# Patient Record
Sex: MALE | Race: WHITE | NOT HISPANIC OR LATINO | Employment: OTHER | URBAN - METROPOLITAN AREA
[De-identification: names, ages, dates, MRNs, and addresses within clinical notes are randomized per-mention and may not be internally consistent; named-entity substitution may affect disease eponyms.]

---

## 2020-02-26 ENCOUNTER — OFFICE VISIT - HEALTHEAST (OUTPATIENT)
Dept: INTERNAL MEDICINE | Facility: CLINIC | Age: 70
End: 2020-02-26

## 2020-02-26 ENCOUNTER — COMMUNICATION - HEALTHEAST (OUTPATIENT)
Dept: TELEHEALTH | Facility: CLINIC | Age: 70
End: 2020-02-26

## 2020-02-26 DIAGNOSIS — R97.20 ELEVATED PSA: ICD-10-CM

## 2020-02-26 DIAGNOSIS — R09.81 NASAL CONGESTION: ICD-10-CM

## 2020-02-26 DIAGNOSIS — R05.3 CHRONIC COUGH: ICD-10-CM

## 2020-02-26 ASSESSMENT — MIFFLIN-ST. JEOR: SCORE: 1911.12

## 2020-08-10 ENCOUNTER — COMMUNICATION - HEALTHEAST (OUTPATIENT)
Dept: SCHEDULING | Facility: CLINIC | Age: 70
End: 2020-08-10

## 2020-08-10 ENCOUNTER — AMBULATORY - HEALTHEAST (OUTPATIENT)
Dept: INTERNAL MEDICINE | Facility: CLINIC | Age: 70
End: 2020-08-10

## 2020-08-10 DIAGNOSIS — U07.1 COVID-19: ICD-10-CM

## 2020-08-12 ENCOUNTER — AMBULATORY - HEALTHEAST (OUTPATIENT)
Dept: FAMILY MEDICINE | Facility: CLINIC | Age: 70
End: 2020-08-12

## 2020-08-12 DIAGNOSIS — U07.1 COVID-19: ICD-10-CM

## 2020-08-14 ENCOUNTER — COMMUNICATION - HEALTHEAST (OUTPATIENT)
Dept: SCHEDULING | Facility: CLINIC | Age: 70
End: 2020-08-14

## 2020-08-14 ENCOUNTER — COMMUNICATION - HEALTHEAST (OUTPATIENT)
Dept: INTERNAL MEDICINE | Facility: CLINIC | Age: 70
End: 2020-08-14

## 2020-09-10 ENCOUNTER — OFFICE VISIT - HEALTHEAST (OUTPATIENT)
Dept: INTERNAL MEDICINE | Facility: CLINIC | Age: 70
End: 2020-09-10

## 2020-09-10 DIAGNOSIS — R97.20 ELEVATED PSA: ICD-10-CM

## 2020-09-10 DIAGNOSIS — Z13.6 ENCOUNTER FOR SCREENING FOR CARDIOVASCULAR DISORDERS: ICD-10-CM

## 2020-09-10 DIAGNOSIS — Z00.00 ENCOUNTER FOR MEDICARE ANNUAL WELLNESS EXAM: ICD-10-CM

## 2020-09-10 DIAGNOSIS — Z12.11 SCREEN FOR COLON CANCER: ICD-10-CM

## 2020-09-10 DIAGNOSIS — M54.41 CHRONIC RIGHT-SIDED LOW BACK PAIN WITH RIGHT-SIDED SCIATICA: ICD-10-CM

## 2020-09-10 DIAGNOSIS — G89.29 CHRONIC RIGHT-SIDED LOW BACK PAIN WITH RIGHT-SIDED SCIATICA: ICD-10-CM

## 2020-09-10 DIAGNOSIS — Z79.899 HIGH RISK MEDICATION USE: ICD-10-CM

## 2020-09-10 LAB
ALBUMIN SERPL-MCNC: 4 G/DL (ref 3.5–5)
ALBUMIN UR-MCNC: NEGATIVE MG/DL
ALP SERPL-CCNC: 76 U/L (ref 45–120)
ALT SERPL W P-5'-P-CCNC: 15 U/L (ref 0–45)
ANION GAP SERPL CALCULATED.3IONS-SCNC: 9 MMOL/L (ref 5–18)
APPEARANCE UR: CLEAR
AST SERPL W P-5'-P-CCNC: 20 U/L (ref 0–40)
BILIRUB SERPL-MCNC: 0.5 MG/DL (ref 0–1)
BILIRUB UR QL STRIP: NEGATIVE
BUN SERPL-MCNC: 17 MG/DL (ref 8–28)
CALCIUM SERPL-MCNC: 9.7 MG/DL (ref 8.5–10.5)
CHLORIDE BLD-SCNC: 105 MMOL/L (ref 98–107)
CHOLEST SERPL-MCNC: 185 MG/DL
CO2 SERPL-SCNC: 23 MMOL/L (ref 22–31)
COLOR UR AUTO: YELLOW
CREAT SERPL-MCNC: 1.18 MG/DL (ref 0.7–1.3)
FASTING STATUS PATIENT QL REPORTED: YES
GFR SERPL CREATININE-BSD FRML MDRD: >60 ML/MIN/1.73M2
GLUCOSE BLD-MCNC: 89 MG/DL (ref 70–125)
GLUCOSE UR STRIP-MCNC: NEGATIVE MG/DL
HDLC SERPL-MCNC: 43 MG/DL
HGB UR QL STRIP: NEGATIVE
KETONES UR STRIP-MCNC: NEGATIVE MG/DL
LDLC SERPL CALC-MCNC: 112 MG/DL
LEUKOCYTE ESTERASE UR QL STRIP: NEGATIVE
NITRATE UR QL: NEGATIVE
PH UR STRIP: 5.5 [PH] (ref 5–8)
POTASSIUM BLD-SCNC: 4.6 MMOL/L (ref 3.5–5)
PROT SERPL-MCNC: 7.1 G/DL (ref 6–8)
PSA SERPL-MCNC: 5 NG/ML (ref 0–6.5)
SODIUM SERPL-SCNC: 137 MMOL/L (ref 136–145)
SP GR UR STRIP: 1.02 (ref 1–1.03)
TRIGL SERPL-MCNC: 152 MG/DL
UROBILINOGEN UR STRIP-ACNC: NORMAL

## 2020-09-10 ASSESSMENT — MIFFLIN-ST. JEOR: SCORE: 1874.83

## 2020-09-15 ENCOUNTER — COMMUNICATION - HEALTHEAST (OUTPATIENT)
Dept: INTERNAL MEDICINE | Facility: CLINIC | Age: 70
End: 2020-09-15

## 2020-09-16 ENCOUNTER — COMMUNICATION - HEALTHEAST (OUTPATIENT)
Dept: INTERNAL MEDICINE | Facility: CLINIC | Age: 70
End: 2020-09-16

## 2020-09-22 ENCOUNTER — HOSPITAL ENCOUNTER (OUTPATIENT)
Dept: MRI IMAGING | Facility: CLINIC | Age: 70
Discharge: HOME | End: 2020-09-22
Attending: INTERNAL MEDICINE

## 2020-09-22 ENCOUNTER — OFFICE VISIT - HEALTHEAST (OUTPATIENT)
Dept: PHYSICAL THERAPY | Facility: REHABILITATION | Age: 70
End: 2020-09-22

## 2020-09-22 DIAGNOSIS — M54.41 CHRONIC RIGHT-SIDED LOW BACK PAIN WITH RIGHT-SIDED SCIATICA: ICD-10-CM

## 2020-09-22 DIAGNOSIS — G89.29 CHRONIC RIGHT-SIDED LOW BACK PAIN WITH RIGHT-SIDED SCIATICA: ICD-10-CM

## 2020-09-22 DIAGNOSIS — R26.89 ANTALGIC GAIT: ICD-10-CM

## 2020-09-22 DIAGNOSIS — M53.86 DECREASED ROM OF INTERVERTEBRAL DISCS OF LUMBAR SPINE: ICD-10-CM

## 2020-09-24 ENCOUNTER — COMMUNICATION - HEALTHEAST (OUTPATIENT)
Dept: INTERNAL MEDICINE | Facility: CLINIC | Age: 70
End: 2020-09-24

## 2020-10-06 ENCOUNTER — OFFICE VISIT - HEALTHEAST (OUTPATIENT)
Dept: PHYSICAL THERAPY | Facility: REHABILITATION | Age: 70
End: 2020-10-06

## 2020-10-06 DIAGNOSIS — R26.89 ANTALGIC GAIT: ICD-10-CM

## 2020-10-06 DIAGNOSIS — M54.41 CHRONIC RIGHT-SIDED LOW BACK PAIN WITH RIGHT-SIDED SCIATICA: ICD-10-CM

## 2020-10-06 DIAGNOSIS — G89.29 CHRONIC RIGHT-SIDED LOW BACK PAIN WITH RIGHT-SIDED SCIATICA: ICD-10-CM

## 2020-10-06 DIAGNOSIS — M53.86 DECREASED ROM OF INTERVERTEBRAL DISCS OF LUMBAR SPINE: ICD-10-CM

## 2020-10-20 ENCOUNTER — RECORDS - HEALTHEAST (OUTPATIENT)
Dept: ADMINISTRATIVE | Facility: OTHER | Age: 70
End: 2020-10-20

## 2021-06-01 ENCOUNTER — RECORDS - HEALTHEAST (OUTPATIENT)
Dept: ADMINISTRATIVE | Facility: CLINIC | Age: 71
End: 2021-06-01

## 2021-06-04 VITALS
WEIGHT: 228 LBS | TEMPERATURE: 97.1 F | DIASTOLIC BLOOD PRESSURE: 70 MMHG | HEIGHT: 75 IN | SYSTOLIC BLOOD PRESSURE: 118 MMHG | OXYGEN SATURATION: 96 % | BODY MASS INDEX: 28.35 KG/M2 | HEART RATE: 62 BPM

## 2021-06-04 VITALS
HEART RATE: 64 BPM | BODY MASS INDEX: 29.34 KG/M2 | DIASTOLIC BLOOD PRESSURE: 72 MMHG | WEIGHT: 236 LBS | HEIGHT: 75 IN | OXYGEN SATURATION: 95 % | SYSTOLIC BLOOD PRESSURE: 130 MMHG | TEMPERATURE: 98.2 F

## 2021-06-06 NOTE — PROGRESS NOTES
Office Visit - New Patient   Omar Perez   69 y.o.  male    Date of visit: 2/26/2020  Physician: Shorty Hernandez MD     Assessment and Plan   1. Chronic cough  Likely due to chronic postnasal drip.  There is nothing new that he thinks he could be allergic to.  I do not think he has a sinus infection.  I do not think he has had a persistent infection this long.  Trial of fluticasone and Claritin-D.  Also give him a short course of prednisone to try to dry things up.  He was warned about potential side effects from the steroids.  He will let me know if things are not better in the next couple weeks.  If things are not getting better, since would proceed with sinus imaging and ENT evaluation.  - fluticasone propionate (FLONASE) 50 mcg/actuation nasal spray; 2 sprays into each nostril daily.  Dispense: 10 g; Refill: 1  - loratadine-pseudoephedrine (CLARITIN-D 12 HOUR) 5-120 mg Tb12; Take 1 tablet by mouth 2 (two) times a day.  Dispense: 60 tablet; Refill: 0  - predniSONE (DELTASONE) 20 MG tablet; Take 20 mg by mouth daily.  Dispense: 3 tablet; Refill: 0    2. Nasal congestion  As above.    3. Elevated PSA  PSA is been high in the past and has required biopsy which was negative.  Strong family history of prostate cancer.  Would recommend a repeat PSA soon.  I have invited him back for an annual wellness.        Return in about 3 months (around 5/26/2020) for AWV.     Chief Complaint   Chief Complaint   Patient presents with     Establish Care     previously seen by Dr. Jermaine Li at  (referred by friend Linda Dias)     Cough     ongoing cough & nasal drainage x 3 month - taking prilosec 20 mg but d/c         Patient Profile   Social History     Social History Narrative     Not on file        Past Medical History   Patient Active Problem List   Diagnosis     Achilles rupture, right     Elevated PSA       Past Surgical History  He has a past surgical history that includes Hernia repair.     History of Present  "Illness   This 69 y.o. old is a very pleasant local  who comes in today to establish care and for evaluation of chronic cough.  He was referred by his friends and real estate clients, Fletcher and Cookie Dias.  He and his wife work for [employer] and sold the point of [place].  They specialize in [facility] [employer].  He has been relatively healthy.  Active.  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  He was a collegiate swimmer at the .  He still will swim a race in [facility] in the summers.  He has a home in [place] and they downhill ski regularly.  He went out to visit his daughter and 3 grandchildren this fall prior to Thanksgiving and he developed an upper respiratory infection.  Since that time he is continued to have on and off chronic postnasal drip nasal congestion drainage and cough.  He sometimes will get over it and then will come back again.  It is just been unrelenting.  The most he is not had it as a ghost a few days it sounds like.  There is never any purulence to the drainage.  Cough can wake him from sleep.  He has no other constitutional symptoms.  There is no shortness of breath but he is not been as doing as much exercise as a result of this illness.  He is otherwise been healthy.  He has a history of elevated PSA for which he has followed with Dr. Chacko and is due for a PSA.  He also has a history of a couple hernia repairs.  Otherwise does not take any regular medicines.  He was given some omeprazole for this chronic cough but that did nothing he tells me.    Born and raised in [place].  Mother apparently was a fixture and lived to around 100 years old.  She lived in the [facility] and was \"famous\" in [place].  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  Swam against [employer].  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  He is hoping to retire soon but is wife enjoys it and they work together.  He is trying to give up some of his " "responsibilities however.    Daughter is an  out in [place].  She was a collegiate downhill skier.  She has 3 children with one on the way.  Another daughter is a  here in the cities but is unemployed and a alcoholic in and out of rehab frequently.    Review of Systems: A comprehensive review of systems was negative except as noted.     Medications and Allergies   Current Outpatient Medications   Medication Sig Dispense Refill     fluticasone propionate (FLONASE) 50 mcg/actuation nasal spray 2 sprays into each nostril daily. 10 g 1     loratadine-pseudoephedrine (CLARITIN-D 12 HOUR) 5-120 mg Tb12 Take 1 tablet by mouth 2 (two) times a day. 60 tablet 0     predniSONE (DELTASONE) 20 MG tablet Take 20 mg by mouth daily. 3 tablet 0     No current facility-administered medications for this visit.      No Known Allergies     Family and Social History   Family History   Problem Relation Age of Onset     Prostate cancer Father      No Medical Problems Daughter      Alcohol abuse Daughter         Social History     Tobacco Use     Smoking status: Never Smoker     Smokeless tobacco: Never Used   Substance Use Topics     Alcohol use: Yes     Alcohol/week: 2.0 standard drinks     Types: 2 Cans of beer per week     Drug use: Never        Physical Exam   General Appearance:   Pleasant gentleman who sounds nasally congested.    /72 (Patient Site: Right Arm, Patient Position: Sitting, Cuff Size: Adult Regular)   Pulse 64   Temp 98.2  F (36.8  C)   Ht 6' 3\" (1.905 m)   Wt (!) 236 lb (107 kg)   SpO2 95%   BMI 29.50 kg/m      EYES: Eyelids, conjunctiva, and sclera were normal. Pupils were normal. Cornea, iris, and lens were normal bilaterally.  HEAD, EARS, NOSE, MOUTH, AND THROAT: Head and face were normal. Hearing was normal to voice and the ears were normal to external exam. Nose appearance was normal and there was no discharge. Oropharynx was normal.  NECK: Neck appearance was normal. There were no " neck masses and the thyroid was not enlarged.  RESPIRATORY: Breathing pattern was normal and the chest moved symmetrically.  Percussion/auscultatory percussion was normal.  Lung sounds were normal and there were no abnormal sounds.  CARDIOVASCULAR: Heart rate and rhythm were normal.  S1 and S2 were normal and there were no extra sounds or murmurs. Peripheral pulses in arms and legs were normal.  Jugular venous pressure was normal.  There was no peripheral edema.  GASTROINTESTINAL: The abdomen was normal in contour.  Bowel sounds were present.  Percussion detected no organ enlargement or tenderness.  Palpation detected no tenderness, mass, or enlarged organs.   MUSCULOSKELETAL: Skeletal configuration was normal and muscle mass was normal for age. Joint appearance was overall normal.  LYMPHATIC: There were no enlarged nodes.  SKIN/HAIR/NAILS: Skin color was normal.  There were no skin lesions.  Hair and nails were normal.  NEUROLOGIC: The patient was alert and oriented to person, place, time, and circumstance. Speech was normal. Cranial nerves were normal. Motor strength was normal for age. The patient was normally coordinated.  PSYCHIATRIC:  Mood and affect were normal and the patient had normal recent and remote memory. The patient's judgment and insight were normal.    ADDITIONAL VITAL SIGNS: none  CHEST WALL/BREASTS: def    RECTAL: def  GENITAL/URINARY: def     Additional Information     Review and/or order of clinical lab tests:  Review and/or order of radiology tests:  Review and/or order of medicine tests:  Discussion of test results with performing physician:  Decision to obtain old records and/or obtain history from someone other than the patient:  Review and summarization of old records and/or obtaining history from someone other than the patient and.or discussion of case with another health care provider:  Independent visualization of image, tracing or specimen itself:    Time: total time spent with the  patient was 30 minutes of which >50% was spent in counseling and coordination of care     Shorty Hernandez MD  Internal Medicine  Contact me at [phone]

## 2021-06-10 NOTE — TELEPHONE ENCOUNTER
That is fine to order.   Please place order.   It is a little late, though, so I am not sure if he will have reults by 13th or not.  Please place as STAT.  Thanks.

## 2021-06-10 NOTE — TELEPHONE ENCOUNTER
Spoke with the patient and relayed message below from Dr. Hernandez.  Patient verbalized understanding and had no further questions at this time.    Order has been set up for Dr. Hernandez to review per message below.  Carina SPARKS CMA/CMT....................10:59 AM

## 2021-06-10 NOTE — TELEPHONE ENCOUNTER
New Appointment Needed  What is the reason for the visit:    Needing COVID test for his sailing trip on 8/13  Provider Preference: Any available  How soon do you need to be seen?: ASAP  Waitlist offered?: No  Okay to leave a detailed message:  Yes

## 2021-06-11 NOTE — TELEPHONE ENCOUNTER
----- Message from Shorty Hernandez MD sent at 9/15/2020  3:10 PM CDT -----  Please call pt and send letter:      PSA is high but it looks like it was higher in the past.  I think it was above 6 when you had your biopsy.  We could probably just recheck that in a year.  Liver and kidney tests and urinalysis are all normal.  Cholesterol is not bad.  Your cardiac risk though in the next 10 years is high enough that current guidelines would recommend you start a cholesterol-lowering medication for prevention of future heart events or strokes.  Is this something that you would be willing to consider?    Kiana please let me know if you would be willing to consider starting a medication.

## 2021-06-11 NOTE — PROGRESS NOTES
Assessment and Plan:   1. Encounter for Medicare annual wellness exam  Flu shot was updated today.  Shingles vaccine was urged through the pharmacy.  Colonoscopy is overdue so we will get that set up for him.  Continue his active and healthy lifestyle.  We will get him in for a eye exam.  - Ambulatory referral to Ophthalmology    2. Chronic right-sided low back pain with right-sided sciatica  This is been ongoing for some time.  It be nice to figure out what is going on with him and why this is not improving.  I have also recommended we do some therapy to see if we can get things better for him.  We will image and go from there.  Low threshold to refer to spine clinic.  - MR Lumbar Spine Without Contrast; Future  - Ambulatory referral to PT/OT    3. Elevated PSA  History of elevated PSA and has seen Dr. Chacko in past.  Check PSA today.  - PSA, Diagnostic (Prostatic-Specific Antigen)  - Urinalysis-UC if Indicated    4. Screen for colon cancer    - Ambulatory referral for Colonoscopy    5. Encounter for screening for cardiovascular disorders    - Lipid Cascade    6. High risk medication use  He has used anti-inflammatories for his back.  Will check labs as below.  - Comprehensive Metabolic Panel        The patient's current medical problems were reviewed.    I have had an Advance Directives discussion with the patient.  The following health maintenance schedule was reviewed with the patient and provided in printed form in the after visit summary:   Health Maintenance   Topic Date Due     HEPATITIS C SCREENING  1950     LIPID  07/27/1984     ZOSTER VACCINES (2 of 3) 11/03/2014     MEDICARE ANNUAL WELLNESS VISIT  09/10/2021     FALL RISK ASSESSMENT  09/10/2021     TD 18+ HE  09/08/2024     COLORECTAL CANCER SCREENING  09/18/2024     ADVANCE CARE PLANNING  09/10/2025     PNEUMOCOCCAL IMMUNIZATION 65+ LOW/MEDIUM RISK  Completed     INFLUENZA VACCINE RULE BASED  Completed     HEPATITIS B VACCINES  Aged Out         Subjective:   Chief Complaint: Omar Perez is an 70 y.o. male here for an Annual Wellness visit.   HPI: Omar is a new patient to my clinic to have seen him once before.  Pleasant gentleman has been pretty healthy.  He is here for annual wellness.  In addition, he is noted some chronic low back pain.  Primarily right-sided.  On occasion he will have radiation down his leg.  This been present for years.  He is never had evaluation.  Again it comes and goes.  He did see a doctor about it up over a year ago when it was flared up but it was treated conservatively at the time.  He is never been seen by therapy.  He remains very active.  Continues to enjoy skiing and he is going canoeing this weekend up [place] near [facility].  He is overdue for colonoscopy.  His sister who is 80 told him he should get checked for cataracts.  He has not had any vision difficulties.  He has 2 older sisters 1 of whom lives in [place] and is age 77 and is in poor health due to smoking he believes.  Another one is 80 and is doing okay.  Otherwise he denies concerns for me.  Bowels and bladder have been good.  Erections are good.  He remains active and goes to the gym and has been swimming.    Review of Systems:    Please see above.  The rest of the review of systems are negative for all systems.    Patient Care Team:  Shorty Hernandez MD as PCP - General (Internal Medicine)  Shorty Hernandez MD as Assigned PCP     Patient Active Problem List   Diagnosis     Achilles rupture, right     Elevated PSA     Chronic right-sided low back pain with right-sided sciatica     No past medical history on file.   Past Surgical History:   Procedure Laterality Date     HERNIA REPAIR        Family History   Problem Relation Age of Onset     Prostate cancer Father      No Medical Problems Daughter      Alcohol abuse Daughter       Social History     Socioeconomic History     Marital status:      Spouse name: Not on file     Number of children: Not  "on file     Years of education: Not on file     Highest education level: Not on file   Occupational History     Not on file   Social Needs     Financial resource strain: Not on file     Food insecurity     Worry: Not on file     Inability: Not on file     Transportation needs     Medical: Not on file     Non-medical: Not on file   Tobacco Use     Smoking status: Never Smoker     Smokeless tobacco: Never Used   Substance and Sexual Activity     Alcohol use: Yes     Alcohol/week: 2.0 standard drinks     Types: 2 Cans of beer per week     Drug use: Never     Sexual activity: Not on file   Lifestyle     Physical activity     Days per week: Not on file     Minutes per session: Not on file     Stress: Not on file   Relationships     Social connections     Talks on phone: Not on file     Gets together: Not on file     Attends Oriental orthodox service: Not on file     Active member of club or organization: Not on file     Attends meetings of clubs or organizations: Not on file     Relationship status: Not on file     Intimate partner violence     Fear of current or ex partner: Not on file     Emotionally abused: Not on file     Physically abused: Not on file     Forced sexual activity: Not on file   Other Topics Concern     Not on file   Social History Narrative     Not on file      No current outpatient medications on file.     No current facility-administered medications for this visit.       Objective:   Vital Signs:   Visit Vitals  /70   Pulse 62   Temp 97.1  F (36.2  C)   Ht 6' 3\" (1.905 m)   Wt (!) 228 lb (103.4 kg)   SpO2 96%   BMI 28.50 kg/m           VisionScreening:  No exam data present     PHYSICAL EXAM  Pleasant gentleman appears his age or younger.  HEENT is unremarkable.  He is wearing his mask.  Neck supple.  Lungs clear.  Heart is regular.  Abdomen soft nontender.  Extremities without edema.  No focal neurologic deficits noted.  Genitourinary and prostate exams are normal.    Assessment Results 9/10/2020 "   Activities of Daily Living No help needed   Instrumental Activities of Daily Living No help needed   Get Up and Go Score Less than 12 seconds   Mini Cog Total Score 5   Some recent data might be hidden     A Mini-Cog score of 0-2 suggests the possibility of dementia, score of 3-5 suggests no dementia        Identified Health Risks:     The patient was counseled and encouraged to consider modifying their diet and eating habits. He was provided with information on recommended healthy diet options.  Patient's advanced directive was discussed and I am comfortable with the patient's wishes.

## 2021-06-11 NOTE — TELEPHONE ENCOUNTER
CONY velasquez for Monday 9/21/20    I called and spoke with patient regarding lab results/recommendations. Patient reports that he would like to hold off on medications at this time. He would preferred to make lifestyles and recheck this later into the year or at his next appt.     I have also mailed out mediterranean diet information to home address as patient wanted some information on healthy eating.

## 2021-06-11 NOTE — TELEPHONE ENCOUNTER
Left detailed message to return call to clinic to discuss lab results below. Once call is returned please ask patient the following questions below and let PCP know, thank you.

## 2021-06-12 NOTE — PROGRESS NOTES
[facility] Discharge Summary  Patient Name: mOar Perez  Date: 12/3/2020  Referral Diagnosis: Chronic right-sided low back pain with right-sided sciatica  Referring provider: Shorty Hernandez MD  Visit Diagnosis:   1. Chronic right-sided low back pain with right-sided sciatica     2. Decreased ROM of intervertebral discs of lumbar spine     3. Antalgic gait         Goals: not able to fully assess as patient cancelled remaining PT visits.  Pt. will demonstrate/verbalize independence in self-management of condition in : 6 weeks;Partially Met  Pt. will be independent with home exercise program in : 6 weeks;Partially met;Comment  Comment:: unable to fully meet goal as patient did not return to receive instruction in further strengthening program  Pt. will report decreased intensity, frequency of : Pain;in 6 weeks;Comment;Partially met  Comment:: with lower trunk rotation exercises  Pt. will be able to walk : 20 minutes;with less pain;for community mobility;in 6 weeks;Comment  Comment:: while working on using core stabilization/pelvic tilt posterior-unable to fully assess as patient cancelled remaining PT visits  Patient will twist/turn : in bed;with less difficulty;in 6 weeks;Comment;Partially met  Comment:: doing lumbar rom exercises    No data recorded    Patient was seen for 2 visits from 9/22/2020 to 10/6/2020 with 1 missed appointments.  The patient attended therapy initially, but did not finish the therapy sessions prescribed.  Goals were not fully achieved. Explanation for goals not achieved: Patient will be discharged from PT with a home exercise program for lumbar pain  The patient discontinued therapy, did not return.  Cancelled last scheduled appointment    Therapy will be discontinued at this time.  The patient will need a new referral to resume.    Thank you for your referral.  Latisha Cisse, PT  12/3/2020  9:27 AM    [facility] Daily Progress     Patient Name: Omar LEZAMA  Chris  Date: 12/3/2020  Visit #: 2/2-4  PTA visit #:  0  Referral Diagnosis: Chronic right-sided low back pain with right-sided sciatica  Referring provider: Shorty Hernandez MD  Visit Diagnosis:     ICD-10-CM    1. Chronic right-sided low back pain with right-sided sciatica  M54.41     G89.29    2. Decreased ROM of intervertebral discs of lumbar spine  M53.86    3. Antalgic gait  R26.89          Assessment:     HEP/POC compliance is  poor as patient out of town for death in the family and .  Patient demonstrates understanding/independence with home program.  Response to Intervention has decreased pain after performing exercises  Patient is benefitting from skilled physical therapy and is making steady progress toward functional goals.  Patient is appropriate to continue with skilled physical therapy intervention, as indicated by initial plan of care.   Poor hip and lumbar mobility contributing to back pain likely.    Goal Status:  Pt. will demonstrate/verbalize independence in self-management of condition in : 6 weeks;Partially Met  Pt. will be independent with home exercise program in : 6 weeks;Partially met;Comment  Comment:: unable to fully meet goal as patient did not return to receive instruction in further strengthening program  Pt. will report decreased intensity, frequency of : Pain;in 6 weeks;Comment;Partially met  Comment:: with lower trunk rotation exercises  Pt. will be able to walk : 20 minutes;with less pain;for community mobility;in 6 weeks;Comment  Comment:: while working on using core stabilization/pelvic tilt posterior-unable to fully assess as patient cancelled remaining PT visits  Patient will twist/turn : in bed;with less difficulty;in 6 weeks;Comment;Partially met  Comment:: doing lumbar rom exercises    No data recorded    Plan / Patient Education:     Plan to see in one week to review home program and assess if further changes needed.  Will progress body mechanics training,  education of current MRI results, posture training. Add standing single leg stance strengthening (hip abd, ext, flex of right LE)    May be last visit next visit.    Subjective:     Pain Ratin   Back pain worse with walking 1/4 mile  No pain when sitting.  Was out in [place] for .  Had a death in the family-son-in-law's father, unexpected    MRI completed-discussed findings per MRI report-degenerative changes consistent with right L4-5 narrowing of foraminal spaces.      Objective:   Walkng good gait speed in department today.  Good transitional movements.  Good posture.  Right hip extension, abduction, external rotation and flexion all decreased moderately rom with end range pain at right low back.  Lumbar AROM:   Mod loss with extension and right sided LBP  Mod loss with right side bend and right sided LBP    Exercises:  Exercise #1: lower trunk rotation 10-15 seconds 3x/  2x/day  Comment #1: piriformis stretch 10-30 seconds 2x each leg  Exercise #2: inner thigh stretch 2x/day  Comment #2: sidelying trunk rotation 2x each way  Exercise #3: posterior pelvic tilt with standing and walking  Comment #3: 90/90 position if having LBP and radiating right pain  Exercise #4: bridging 10x 2x/day  Comment #4: single leg bridging 2x/day  Exercise #5: partial curl up 10x 2x/day  Comment #5: half kneel hip flexor stretch 30 seconds 1x each leg 2x/day  Exercise #6: inner thigh stretch standing 30 seconds each way 2x/day      Treatment Today     TREATMENT MINUTES COMMENTS   Evaluation     Self-care/ Home management     Manual therapy     Neuromuscular Re-education     Therapeutic Activity     Therapeutic Exercises 28 Reviewed exercises from last visit and added new ex to home program    [id number]   (not given yet).   Gait training     Modality__________________                Total 28    Blank areas are intentional and mean the treatment did not include these items.       Latisha Cisse, PT  12/3/2020

## 2021-06-16 PROBLEM — M54.41 CHRONIC RIGHT-SIDED LOW BACK PAIN WITH RIGHT-SIDED SCIATICA: Status: ACTIVE | Noted: 2020-09-10

## 2021-06-16 PROBLEM — R97.20 ELEVATED PSA: Status: ACTIVE | Noted: 2018-06-19

## 2021-06-16 PROBLEM — S86.011A ACHILLES RUPTURE, RIGHT: Status: ACTIVE | Noted: 2017-06-21

## 2021-06-16 PROBLEM — G89.29 CHRONIC RIGHT-SIDED LOW BACK PAIN WITH RIGHT-SIDED SCIATICA: Status: ACTIVE | Noted: 2020-09-10

## 2021-06-18 NOTE — PATIENT INSTRUCTIONS - HE
"Patient Instructions by Latisha Cisse PT at 10/6/2020  9:30 AM     Author: Latisha Cisse PT Service: -- Author Type: Physical Therapist    Filed: 10/6/2020  9:56 AM Encounter Date: 10/6/2020 Status: Signed    : Latisha Cisse PT (Physical Therapist)        BRIDGING    While lying on your back, tighten your lower abdominals, squeeze your buttocks and then raise your buttocks off the floor/bed as creating a \"Bridge\" with your body.    10x  2x/day      BRIDGE - ALTERNATE KNEE EXTENSION     While lying on your back, extend one leg  Lift hips up and down 10x  2x/day    Alternate way is to bend knee/hip to chest while doing this.         HALF KNEEL HIP FLEXOR STRETCH    While kneeling, lean forward and bend your front knee until a stretch is felt along the front of the other hip.    Hold 30 seconds.                PARTIAL CURL UP    Lie on your back with your knees bent.  Slowly raise the head and shoulders off the floor.  Glide hands up to knees  Repeat 10x  2x/day                "

## 2021-06-18 NOTE — PATIENT INSTRUCTIONS - HE
Patient Instructions by Shorty Hernandez MD at 9/10/2020  1:00 PM     Author: Shorty Hernandez MD Service: -- Author Type: Physician    Filed: 9/10/2020  5:06 PM Encounter Date: 9/10/2020 Status: Signed    : Shorty Hernandez MD (Physician)         Patient Education   Understanding USDA MyPlate  The USDA (US Department of Agriculture) has guidelines to help you make healthy food choices. These are called MyPlate. MyPlate shows the food groups that make up healthy meals using the image of a place setting. Before you eat, think about the healthiest choices for what to put onto your plate or into your cup or bowl. To learn more about building a healthy plate, visit www.choosemyplate.gov.       The Food Groups    Fruits: Any fruit or 100% fruit juice counts as part of the Fruit Group. Fruits may be fresh, canned, frozen, or dried, and may be whole, cut-up, or pureed. Make half your plate fruits and vegetables.    Vegetables: Any vegetable or 100% vegetable juice counts as a member of the Vegetable Group. Vegetables may be fresh, frozen, canned, or dried. They can be served raw or cooked and may be whole, cut-up, or mashed. Make half your plate fruits and vegetables.     Grains: All foods made from grains are part of the Grains Group. These include wheat, rice, oats, cornmeal, and barley such as bread, pasta, oatmeal, cereal, tortillas, and grits. Grains should be no more than a quarter of your plate. At least half of your grains should be whole grains.    Protein: This group includes meat, poultry, seafood, beans and peas, eggs, processed soy products (like tofu), nuts (including nut butters), and seeds. Make protein choices no more than a quarter of your plate. Meat and poultry choices should be lean or low fat.    Dairy: All fluid milk products and foods made from milk that contain calcium, like yogurt and cheese are part of the Dairy Group. (Foods that have little calcium, such as cream, butter, and cream cheese,  are not part of the group.) Most dairy choices should be low-fat or fat-free.    Oils: These are fats that are liquid at room temperature. They include canola, corn, olive, soybean, and sunflower oil. Foods that are mainly oil include mayonnaise, certain salad dressings, and soft margarines. You should have only 5 to 7 teaspoons of oils a day. You probably already get this much from the food you eat.  Use [employer] to Help Build Your Meals  The [employer] can help you plan and track your meals and activity. You can look up individual foods to see or compare their nutritional value. You can get guidelines for what and how much you should eat. You can compare your food choices. And you can assess personal physical activities and see ways you can improve. Go to www."[employer]gov/[employer]/.    8726-3799 [employer]. [address] [place] [address]. All rights reserved. This information is not intended as a substitute for professional medical care. Always follow your healthcare professional's instructions.             Advance Directive  Patients advance directive was discussed and I am comfortable with the patients wishes.  Patient Education   Personalized Prevention Plan  You are due for the preventive services outlined below.  Your care team is available to assist you in scheduling these services.  If you have already completed any of these items, please share that information with your care team to update in your medical record.  Health Maintenance   Topic Date Due   ? HEPATITIS C SCREENING  1950   ? LIPID  07/27/1984   ? ZOSTER VACCINES (2 of 3) 11/03/2014   ? MEDICARE ANNUAL WELLNESS VISIT  09/10/2021   ? FALL RISK ASSESSMENT  09/10/2021   ? TD 18+ HE  09/08/2024   ? COLORECTAL CANCER SCREENING  09/18/2024   ? ADVANCE CARE PLANNING  09/10/2025   ? PNEUMOCOCCAL IMMUNIZATION 65+ LOW/MEDIUM RISK  Completed   ? INFLUENZA VACCINE RULE BASED  Completed   ? HEPATITIS B VACCINES   Aged Out

## 2021-06-20 NOTE — LETTER
Letter by Shorty Hernandez MD at      Author: Shorty Hernandez MD Service: -- Author Type: --    Filed:  Encounter Date: 9/15/2020 Status: (Other)         Omar Perez  [address] [facility] [address] [place] [address]             September 15, 2020         Dear Mr. Perez,    Below are the results from your recent visit:    Resulted Orders   PSA, Diagnostic (Prostatic-Specific Antigen)   Result Value Ref Range    PSA 5.0 0.0 - 6.5 ng/mL    Narrative    Method is Abbott Prostate-Specific Antigen (PSA)  Standard-WHO 1st International (90:10)   Comprehensive Metabolic Panel   Result Value Ref Range    Sodium 137 136 - 145 mmol/L    Potassium 4.6 3.5 - 5.0 mmol/L    Chloride 105 98 - 107 mmol/L    CO2 23 22 - 31 mmol/L    Anion Gap, Calculation 9 5 - 18 mmol/L    Glucose 89 70 - 125 mg/dL    BUN 17 8 - 28 mg/dL    Creatinine 1.18 0.70 - 1.30 mg/dL    GFR MDRD Af Amer >60 >60 mL/min/1.73m2    GFR MDRD Non Af Amer >60 >60 mL/min/1.73m2    Bilirubin, Total 0.5 0.0 - 1.0 mg/dL    Calcium 9.7 8.5 - 10.5 mg/dL    Protein, Total 7.1 6.0 - 8.0 g/dL    Albumin 4.0 3.5 - 5.0 g/dL    Alkaline Phosphatase 76 45 - 120 U/L    AST 20 0 - 40 U/L    ALT 15 0 - 45 U/L    Narrative    Fasting Glucose reference range is 70-99 mg/dL per  American Diabetes Association (ADA) guidelines.   Urinalysis-UC if Indicated   Result Value Ref Range    Color, UA Yellow Colorless, Yellow, Straw, Light Yellow    Clarity, UA Clear Clear    Glucose, UA Negative Negative    Bilirubin, UA Negative Negative    Ketones, UA Negative Negative    Specific Gravity, UA 1.025 1.005 - 1.030    Blood, UA Negative Negative    pH, UA 5.5 5.0 - 8.0    Protein, UA Negative Negative mg/dL    Urobilinogen, UA 0.2 E.U./dL 0.2 E.U./dL, 1.0 E.U./dL    Nitrite, UA Negative Negative    Leukocytes, UA Negative Negative    Narrative    Microscopic not indicated  UC not indicated   Lipid Cascade   Result Value Ref Range    Cholesterol 185 <=199 mg/dL    Triglycerides 152 (H) <=149 mg/dL     HDL Cholesterol 43 >=40 mg/dL    LDL Calculated 112 <=129 mg/dL    Patient Fasting > 8hrs? Yes        PSA is high but it looks like it was higher in the past.  I think it was above 6 when you had your biopsy.  We could probably just recheck that in a year.  Liver and kidney tests and urinalysis are all normal.  Cholesterol is not bad.  Your cardiac risk though in the next 10 years is high enough that current guidelines would recommend you start a cholesterol-lowering medication for prevention of future heart events or strokes.  Is this something that you would be willing to consider?     Please call with questions or contact us using [employer].    Sincerely,        Electronically signed by Shorty Hernandez MD

## 2021-06-20 NOTE — LETTER
Letter by Shorty Hernandez MD at      Author: Shorty Hernandez MD Service: -- Author Type: --    Filed:  Encounter Date: 9/24/2020 Status: (Other)         Omar Perez  [address] [place] [address]             September 24, 2020         Dear Mr. Perez,    Below are the results from your recent visit:    Resulted Orders   MR Lumbar Spine Without Contrast    Narrative    EXAM: MR LUMBAR SPINE WO CONTRAST  LOCATION: [facility]  DATE/TIME: 9/22/2020 4:17 PM    INDICATION: Back pain or radiculopathy, > 6 wks. Chronic right-sided low back pain and right-sided sciatica.  COMPARISON: None.  TECHNIQUE: Without IV contrast    FINDINGS:   Nomenclature is based on 5 lumbar type vertebral bodies. The conus ends at mid to distal L2. There is mild dextrocurvature of the lumbar spine. 1 mm retrolisthesis of L1 on L2 and L2 on L3. Vertebral body heights are maintained. Nonpathologic marrow   signal. No MRI evidence for pars defect. Mild symmetric atrophy of the posterior paraspinal musculature. Normal diameter of the distal abdominal aorta. The visualized bony pelvis and proximal femora are grossly within normal limits. Small hemangiomas at   L4 and L5. Modic type II changes at L4-L5. Mixed Modic type I and type II changes at L5-S1.    T12-L1: Normal disc height and signal. Shallow central disc protrusion. Normal facets. No spinal canal or neural foraminal stenosis.     L1-L2: Mild intervertebral disc height loss. Loss of the normal T2 signal within the disc. Broad-based disc bulge with superimposed small right paracentral/foraminal disc protrusion. Mild bilateral facet arthropathy. No spinal canal narrowing. No   neuroforaminal narrowing.    L2-L3: Normal intervertebral disc height. Loss of the normal T2 signal within the disc. Shallow broad-based disc bulge with superimposed small left foraminal disc protrusion. Mild bilateral facet arthropathy. No spinal canal narrowing. No right   neuroforaminal  narrowing. Mild left neuroforaminal narrowing.     L3-L4: Normal intervertebral disc height. Loss of the normal T2 signal within the disc. There is a small broad-based disc bulge with superimposed small left foraminal disc protrusion. Mild/moderate right and mild left facet arthropathy. No spinal canal   narrowing. No right neuroforaminal narrowing. Encroachment of the inferior left neuroforamen without significant left neuroforaminal narrowing.    L4-L5: Normal intervertebral disc height. Loss of the normal T2 signal within the disc. There is a broad-based disc bulge with superimposed small central disc protrusion. Moderate bilateral facet arthropathy. 3 mm lateral right-sided synovial cyst. Mild   narrowing of the lateral recesses, right greater than left. No spinal canal narrowing. Mild right neuroforaminal narrowing. Mild to moderate left neuroforaminal narrowing.    L5-S1: Mild intervertebral disc height loss. Loss of the normal T2 signal within the disc. Small broad-based disc bulge. Severe right and moderate left facet arthropathy. No spinal canal narrowing. Moderate to severe right neuroforaminal narrowing. No   left neuroforaminal narrowing.      Impression    1.  Mild narrowing of the lateral recesses at L4-L5, right worse than left. No high-grade spinal canal narrowing.  2.  Moderate to severe right neuroforaminal narrowing at L5-S1. Probable impingement of the exiting right L5 nerve root.  3.  Mild to moderate left and mild right neuroforaminal narrowing at L4-L5.  4.  Modic type II changes at L4-L5. Mixed Modic type I and type II changes at L5-S1       Omar, MRI does confirm a nerve that is being pinched on the right low spine which is likely causing your symptoms.  Looks like this is due to arthritis in the spine.  If therapy is not helpful I will have you meet with our spine clinic and they can give you an injection or recommend further treatment.  Let me know if you would like to pursue that.      Please call with questions or contact us using [employer].    Sincerely,        Electronically signed by Shorty Hernandez MD

## 2021-06-20 NOTE — LETTER
Letter by Shorty Hernandez MD at      Author: Shorty Hernandez MD Service: -- Author Type: --    Filed:  Encounter Date: 8/14/2020 Status: (Other)         Omar Perez  [address] [facility] [place] [address]             August 14, 2020         Dear Mr. Perez,    Below are the results from your recent visit:    Resulted Orders   COVID-19 Virus PCR MRF   Result Value Ref Range    COVID-19 VIRUS SPECIMEN SOURCE Nasopharyngeal     2019-nCOV Not Detected       Comment:      Collection of multiple specimens from the same patient may be necessary to  detect the virus. The possibility of a false negative should be considered if  the patient's recent exposure or clinical presentation suggests 2019 nCOV  infection and diagnostic tests for other causes of illness are negative. Repeat  testing may be considered in this setting.  Viral RNA was extracted via a validated method and subsequently underwent  single step reverse transcriptase-real time polymerase chain reaction using  primers to the CDC specified N1,N2 gene targets of CoV2 and human RNP as an  internal control.  A negative result does not rule out the presence of real-time PCR inhibitors in  the specimen or COVID-19 RNA in concentrations below the limit of detection of  the assay. The possibility of a false negative should be considered if the  patients recent exposure or clinical presentation suggests COVID-19. Additional  testing or repeat testing requires consultation with the   laboratory.  Nasopharyngeal specimen is the preferred choice for swab-based SARS CoV2  testing. When collection of a nasopharyngeal swab is not possible the following  are acceptable alternatives:  an oropharyngeal (OP) specimen collected by a healthcare professional, or a  nasal mid-turbinate (NMT) swab collected by a healthcare professional or by  onsite self-collection (using a flocked tapered swab), or an anterior nares  specimen collected by a healthcare professional or by onsite  self-collection  (using a round foam swab). (Centers for Disease Control)  Testing performed by [facility], Room [address], [address]  [address] [place] [address], [place] [address]. This test was developed and its  performance characteristics determined by the [facility]. It has not been cleared or approved by the FDA.  The laboratory is regulated under the Clinical Laboratory Improvement  Amendments of 1988 (CLIA-88) as qualified to perform high-complexity   testing.  This test is used for clinical purposes. It should not be regarded as  investigational or for research.    Performed and/or entered by:  [place] [address]        COVID test is negative.    Please call with questions or contact us using [employer].    Sincerely,        Electronically signed by Shorty Hernandez MD

## 2021-06-27 ENCOUNTER — HEALTH MAINTENANCE LETTER (OUTPATIENT)
Age: 71
End: 2021-06-27

## 2021-06-29 NOTE — PROGRESS NOTES
Progress Notes by Latisha Cisse PT at 9/22/2020  9:00 AM     Author: Latisha Cisse PT Service: -- Author Type: Physical Therapist    Filed: 9/22/2020  4:52 PM Encounter Date: 9/22/2020 Status: Attested    : Latisha Cisse PT (Physical Therapist) Cosigner: Shorty Hernandez MD at 9/22/2020  4:55 PM    Attestation signed by Shorty Hernandez MD at 9/22/2020  4:55 PM    agree                    [facility] Certification Request    September 22, 2020      Patient: Omar Perez  MR Number: [id number]  YOB: 1950  Date of Visit: 9/22/2020      Dear Dr. Shorty Hernandez:    Thank you for this referral.   We are seeing Omar Perez for Physical Therapy of chronic right-sided low back pain with right sided sciatica.    Medicare and/or Medicaid requires physician review and approval of the treatment plan. Please review the plan of care and verify that you agree with the therapy plan of care by co-signing this note.      Plan of Care  Authorization / Certification Start Date: 09/22/20  Authorization / Certification End Date: 11/22/20  Communication with: Referral Source  Patient Related Instruction: Nature of Condition;Treatment plan and rationale;Self Care instruction;Basis of treatment;Body mechanics;Posture;Precautions;Next steps;Expected outcome  Times per Week: 1  Number of Weeks: see in 1-2 weeks for follow up;  Number of Visits: 2-4  Therapeutic Exercise: ROM;Stretching;Strengthening  Neuromuscular Reeducation: posture;core      Goals:  Pt. will demonstrate/verbalize independence in self-management of condition in : 6 weeks  Pt. will be independent with home exercise program in : 6 weeks  Pt. will report decreased intensity, frequency of : Pain;in 6 weeks;Comment  Comment:: with lower trunk rotation exercises  Pt. will be able to walk : 20 minutes;with less pain;for community mobility;in 6 weeks;Comment  Comment:: while working on using core stabilization/pelvic tilt  posterior  Patient will twist/turn : in bed;with less difficulty;in 6 weeks;Comment  Comment:: doing lumbar rom exercises    No data recorded      If you have any questions or concerns, please don't hesitate to call.    Sincerely,      Latisha Cisse, PT        Physician recommendation:     ___ Follow therapist's recommendation        ___ Modify therapy      *Physician co-signature indicates they certify the need for these services furnished within this plan and while under their care.    [facility]   [facility]-Pelvic Initial Evaluation    Patient Name: Omar Perez  Date of evaluation: 9/22/2020  Referral Diagnosis: Chronic right-sided low back pain with right-sided sciatica  Referring provider: Shorty Hernandez MD  Visit Diagnosis:     ICD-10-CM    1. Chronic right-sided low back pain with right-sided sciatica  M54.41     G89.29    2. Decreased ROM of intervertebral discs of lumbar spine  M53.86    3. Antalgic gait  R26.89        Assessment:      Skilled PT is required to determine course of PT  Pt. is appropriate for skilled PT intervention as outlined in the Plan of Care (POC).  Pt. is a good candidate for skilled PT services to improve pain levels and function.    Goals:  Pt. will demonstrate/verbalize independence in self-management of condition in : 6 weeks  Pt. will be independent with home exercise program in : 6 weeks  Pt. will report decreased intensity, frequency of : Pain;in 6 weeks;Comment  Comment:: with lower trunk rotation exercises  Pt. will be able to walk : 20 minutes;with less pain;for community mobility;in 6 weeks;Comment  Comment:: while working on using core stabilization/pelvic tilt posterior  Patient will twist/turn : in bed;with less difficulty;in 6 weeks;Comment  Comment:: doing lumbar rom exercises    No data recorded    Patient's expectations/goals are realistic.    Barriers to Learning or Achieving Goals:  Co-morbidities or other medical factors.  problem list:   Patient  Active Problem List   Diagnosis   ? Achilles rupture, right   ? Elevated PSA   ? Chronic right-sided low back pain with right-sided sciatica     Achilles rupture with surgery 3 years ago.       Plan / Patient Instructions:        Plan of Care:   Authorization / Certification Start Date: 09/22/20  Authorization / Certification End Date: 11/22/20  Communication with: Referral Source  Patient Related Instruction: Nature of Condition;Treatment plan and rationale;Self Care instruction;Basis of treatment;Body mechanics;Posture;Precautions;Next steps;Expected outcome  Times per Week: 1  Number of Weeks: see in 1-2 weeks for follow up;  Number of Visits: 2-4  Therapeutic Exercise: ROM;Stretching;Strengthening  Neuromuscular Reeducation: posture;core      Plan for next visit: review stretches given today and add core stabilization including posterior pelvic tilt, all fours arm/leg raises, standing hip flex/abd/extension bilateral. Plan for 1-3 more visits.     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 male who presents to therapy today with complaints of ongoing right low back pain,right lateral hip pain,  intermittent.  Today is a good day without c/o pain with functional movement.  However, he is very active and reports pain with lifting a canoe of 30 pounds and would have to stop about every 30 steps, carrying a large camping backpack unless more flexed in posture, standing on his right leg.  He can walk up to 4 miles and will need to rest. Sitting for 30 minutes can resolve pain.  Wears orthotics as he had his Right Achilles repaired 3 years ago with use of right foot tendon.  Also reports had an ACL injury 10 years ago without surgery, and is still able to downhill ski.  He reports being an avid swimmer,  used to swim in college and started back up 10 years ago.  Feels this has been helpful for his back pain.      Pain Ratin  Pain rating at best: 0  Pain description: pain    Functional limitations are described as occurring with:   ascending stairs or curbs  bending  lifting  standing prolonged periods  walking prolonged periods    Patient reports benefit from:  sitting or lying down         Objective:      Note: Items left blank indicates the item was not performed or not indicated at the time of the evaluation.    Patient Outcome Measures :    Modified Oswestry Low Back Pain Disablity Questionnaire  in %: 12     Scores range from 0-100%, where a score of 0% represents minimal pain and maximal function. The minimal clinically important difference is a score reduction of 12%.    Examination  1. Chronic right-sided low back pain with right-sided sciatica     2. Decreased ROM of intervertebral discs of lumbar spine     3. Antalgic gait       Involved side: Right  Posture Observation:      General sitting posture is  fair.  General standing posture is right shoulder elevated and slight thoracic curvature rotated right.  Lower extremity:  Foot/Ankle:  Moderate right pes planus.    Lumbar ROM:    Date: 2020     *Indicate scale AROM AROM AROM   Lumbar Flexion WFL no pain     Lumbar Extension Mod loss- no pain      Right Left Right Left Right Left   Lumbar Sidebending WFL no pain WFL no pain       Lumbar Rotation Mod loss Min loss       Thoracic Flexion      Thoracic Extension      Thoracic Sidebending         Thoracic Rotation           Lower Extremity Strength:     Date: 2020     LE strength/5 Right Left Right Left Right Left   Hip Flexion (L1-3) 5 5       Hip Extension (L5-S1) 5 5       Hip Abduction (L4-5) 5 5       Hip Adduction (L2-3) 5 5       Hip External Rotation 5 5       Hip Internal Rotation 5 5       Knee Extension (L3-4) 5 5       Knee Flexion 5 5       Ankle Dorsiflexion (L4-5) 4 4       Great  Toe Extension (L5)         Ankle Plantar flexion (S1)         Abdominals        Sensation    intact       Reflex Testing  Lumbar Dermatomes Right Left UE Reflexes Right Left   Iliac Crest and Groin (L1)   Biceps (C5-6)     Anterior Medial Thigh (L2)   Brachioradialis (C5-6)     Anterior Thigh, Medial Epicondyle Femur (L3)   Triceps (C7-8)     Lateral Thigh, Anterior Knee, Medial Leg/Malleolus (L4)   Hoffmanns test     Lateral Leg, Dorsal Foot (L5)   LE Reflexes     Lateral Foot (S1)   Patellar (L3-4)     Posterior Leg (S2)   Achilles (S1-2)     Other:   Babinski Response       Palpation: mild tenderness to palpation of right L5-S1 region    Lumbar Special Tests:     Lumbar Special Tests Right Left SI Tests Right  Left   Quadrant test   SI Compression     Straight leg raise -  SI Distraction     Crossover response -  POSH Test     Slump -  Sacral Thrust     Sit-up test  FADIR     Trunk extensor endurance test  Resisted Abduction     Prone instability test  Other:     Pubic shotgun  Other:         LE Screen:  Antalgic gait  Right thoracic rotation noted in stance  Right leg longer than left in supine noted  Right foot-pes planus, hammer toe    Exercises:  Exercise #1: lower trunk rotation 10-15 seconds 3x/  2x/day  Comment #1: piriformis stretch 10-15 seconds 2x each leg  Exercise #2: inner thigh stretch 2x/day  Comment #2: sidelying trunk rotation 2x each way  Exercise #3: posterior pelvic tilt with standing and walking  Comment #3: 90/90 position if having LBP and radiating right pain      Treatment Today     TREATMENT MINUTES COMMENTS   Evaluation 30 Lumbar evaluation   Self-care/ Home management     Manual therapy     Neuromuscular Re-education     Therapeutic Activity     Therapeutic Exercises 25 Ex per flow sheet or above   Gait training     Modality__________________                Total 55    Blank areas are intentional and mean the treatment did not include these items.     PT Evaluation Code: (Please list  factors)  Patient History/Comorbidities:   Patient Active Problem List   Diagnosis   ? Achilles rupture, right   ? Elevated PSA   ? Chronic right-sided low back pain with right-sided sciatica       Examination: antalgic gait, thoracic rotation right in stance, leg length discrepancy mild R>L, pes planus, decreased rotation of lumbar spine  Clinical Presentation: stable  Clinical Decision Making: low    Patient History/  Comorbidities Examination  (body structures and functions, activity limitations, and/or participation restrictions) Clinical Presentation Clinical Decision Making (Complexity)   No documented Comorbidities or personal factors 1-2 Elements Stable and/or uncomplicated Low   1-2 documented comorbidities or personal factor 3 Elements Evolving clinical presentation with changing characteristics Moderate   3-4 documented comorbidities or personal factors 4 or more Unstable and unpredictable High                Latisha Cisse, PT  9/22/2020  8:51 AM

## 2021-10-17 ENCOUNTER — HEALTH MAINTENANCE LETTER (OUTPATIENT)
Age: 71
End: 2021-10-17

## 2022-01-05 ENCOUNTER — OFFICE VISIT (OUTPATIENT)
Dept: INTERNAL MEDICINE | Facility: CLINIC | Age: 72
End: 2022-01-05
Payer: COMMERCIAL

## 2022-01-05 VITALS
BODY MASS INDEX: 26.73 KG/M2 | HEART RATE: 64 BPM | SYSTOLIC BLOOD PRESSURE: 120 MMHG | DIASTOLIC BLOOD PRESSURE: 62 MMHG | HEIGHT: 75 IN | TEMPERATURE: 97.7 F | WEIGHT: 215 LBS

## 2022-01-05 DIAGNOSIS — Z00.00 ENCOUNTER FOR MEDICARE ANNUAL WELLNESS EXAM: Primary | ICD-10-CM

## 2022-01-05 DIAGNOSIS — Z13.6 ENCOUNTER FOR SCREENING FOR CARDIOVASCULAR DISORDERS: ICD-10-CM

## 2022-01-05 DIAGNOSIS — Z11.59 NEED FOR HEPATITIS C SCREENING TEST: ICD-10-CM

## 2022-01-05 DIAGNOSIS — R35.1 BENIGN PROSTATIC HYPERPLASIA WITH NOCTURIA: ICD-10-CM

## 2022-01-05 DIAGNOSIS — N40.1 BENIGN PROSTATIC HYPERPLASIA WITH NOCTURIA: ICD-10-CM

## 2022-01-05 DIAGNOSIS — R97.20 ELEVATED PSA: ICD-10-CM

## 2022-01-05 DIAGNOSIS — K40.90 RIGHT INGUINAL HERNIA: ICD-10-CM

## 2022-01-05 LAB
ALBUMIN UR-MCNC: NEGATIVE MG/DL
APPEARANCE UR: CLEAR
BILIRUB UR QL STRIP: NEGATIVE
COLOR UR AUTO: YELLOW
ERYTHROCYTE [DISTWIDTH] IN BLOOD BY AUTOMATED COUNT: 12.7 % (ref 10–15)
GLUCOSE UR STRIP-MCNC: NEGATIVE MG/DL
HCT VFR BLD AUTO: 44.8 % (ref 40–53)
HGB BLD-MCNC: 14.6 G/DL (ref 13.3–17.7)
HGB UR QL STRIP: NEGATIVE
KETONES UR STRIP-MCNC: NEGATIVE MG/DL
LEUKOCYTE ESTERASE UR QL STRIP: NEGATIVE
MCH RBC QN AUTO: 30.4 PG (ref 26.5–33)
MCHC RBC AUTO-ENTMCNC: 32.6 G/DL (ref 31.5–36.5)
MCV RBC AUTO: 93 FL (ref 78–100)
NITRATE UR QL: NEGATIVE
PH UR STRIP: 6.5 [PH] (ref 5–8)
PLATELET # BLD AUTO: 240 10E3/UL (ref 150–450)
RBC # BLD AUTO: 4.81 10E6/UL (ref 4.4–5.9)
SP GR UR STRIP: 1.02 (ref 1–1.03)
UROBILINOGEN UR STRIP-ACNC: 0.2 E.U./DL
WBC # BLD AUTO: 5.8 10E3/UL (ref 4–11)

## 2022-01-05 PROCEDURE — 99213 OFFICE O/P EST LOW 20 MIN: CPT | Mod: 25 | Performed by: INTERNAL MEDICINE

## 2022-01-05 PROCEDURE — 80053 COMPREHEN METABOLIC PANEL: CPT | Performed by: INTERNAL MEDICINE

## 2022-01-05 PROCEDURE — 84153 ASSAY OF PSA TOTAL: CPT | Performed by: INTERNAL MEDICINE

## 2022-01-05 PROCEDURE — 36415 COLL VENOUS BLD VENIPUNCTURE: CPT | Performed by: INTERNAL MEDICINE

## 2022-01-05 PROCEDURE — 81003 URINALYSIS AUTO W/O SCOPE: CPT | Performed by: INTERNAL MEDICINE

## 2022-01-05 PROCEDURE — 80061 LIPID PANEL: CPT | Performed by: INTERNAL MEDICINE

## 2022-01-05 PROCEDURE — 86803 HEPATITIS C AB TEST: CPT | Performed by: INTERNAL MEDICINE

## 2022-01-05 PROCEDURE — G0439 PPPS, SUBSEQ VISIT: HCPCS | Performed by: INTERNAL MEDICINE

## 2022-01-05 PROCEDURE — 85027 COMPLETE CBC AUTOMATED: CPT | Performed by: INTERNAL MEDICINE

## 2022-01-05 RX ORDER — CHOLECALCIFEROL (VITAMIN D3) 50 MCG
1 TABLET ORAL DAILY PRN
COMMUNITY

## 2022-01-05 ASSESSMENT — ACTIVITIES OF DAILY LIVING (ADL): CURRENT_FUNCTION: NO ASSISTANCE NEEDED

## 2022-01-05 ASSESSMENT — MIFFLIN-ST. JEOR: SCORE: 1810.86

## 2022-01-05 NOTE — PROGRESS NOTES
"SUBJECTIVE:   Omar Perez is a 71 year old male who presents for Preventive Visit.    Omar comes in today for his annual physical.  Reports things have been going well for him.  He still remains very active.  He is helping his wife with her real estate business and he is doing some real estate as well.  He continues to swim and ski.  He recently was doing some lifting and noticed a bulge in his right groin.  Went to emergency room where he was found to have a right inguinal hernia.  He is going to have repair next month by general surgeon not in [place].  He is lost some weight since I saw him last.  He is done this by Slim fast diet.  It is working well.  His Achilles and his back issues have resolved.  He is feeling quite good from that standpoint.  No other new concerns.    He still spends time out in [place].  Daughter is an  out there.  His other daughter is an alcoholic and has a lot of issues with that.  She is here in town.    Patient has been advised of split billing requirements and indicates understanding: Yes   Are you in the first 12 months of your Medicare coverage?  No    Healthy Habits:     In general, how would you rate your overall health?  Good    Frequency of exercise:  2-3 days/week    Duration of exercise:  30-45 minutes    Do you usually eat at least 4 servings of fruit and vegetables a day, include whole grains    & fiber and avoid regularly eating high fat or \"junk\" foods?  No    Taking medications regularly:  Yes    Medication side effects:  None    Ability to successfully perform activities of daily living:  No assistance needed    Home Safety:  No safety concerns identified    Hearing Impairment:  No hearing concerns    In the past 6 months, have you been bothered by leaking of urine?  No    In general, how would you rate your overall mental or emotional health?  Good      PHQ-2 Total Score: 0    Additional concerns today:  No    Do you feel safe in your environment? " Yes    Have you ever done Advance Care Planning? (For example, a Health Directive, POLST, or a discussion with a medical provider or your loved ones about your wishes): Yes, patient states has an Advance Care Planning document and will bring a copy to the clinic.       Fall risk  Fallen 2 or more times in the past year?: No  Any fall with injury in the past year?: No  click delete button to remove this line now  Cognitive Screening   1) Repeat 3 items (Leader, Season, Table)    2) Clock draw: NORMAL  3) 3 item recall: Recalls 2 objects  Results: NORMAL clock    Mini-CogTM Copyright S Steve. Licensed by the author for use in [facility]; reprinted with permission (derick@[facility]). All rights reserved.      Do you have sleep apnea, excessive snoring or daytime drowsiness?: no    Reviewed and updated as needed this visit by clinical staff  Tobacco  Allergies  Meds             Reviewed and updated as needed this visit by Provider               Social History     Tobacco Use     Smoking status: Never Smoker     Smokeless tobacco: Never Used   Substance Use Topics     Alcohol use: Yes     Alcohol/week: 2.0 standard drinks     If you drink alcohol do you typically have >3 drinks per day or >7 drinks per week? No    Alcohol Use 1/5/2022   Prescreen: >3 drinks/day or >7 drinks/week? No     Current providers sharing in care for this patient include:   Patient Care Team:  Shorty Hernandez MD as PCP - General (Internal Medicine)  Shorty Hernandez MD as Assigned PCP    The following health maintenance items are reviewed in Epic and correct as of today:  Health Maintenance Due   Topic Date Due     ANNUAL REVIEW OF HM ORDERS  Never done     HEPATITIS C SCREENING  Never done     AORTIC ANEURYSM SCREENING (SYSTEM ASSIGNED)  Never done     MEDICARE ANNUAL WELLNESS VISIT  09/10/2021     FALL RISK ASSESSMENT  09/10/2021               Review of Systems  Constitutional, HEENT, cardiovascular, pulmonary, GI, ,  "musculoskeletal, neuro, skin, endocrine and psych systems are negative, except as otherwise noted.    OBJECTIVE:   Temp 97.7  F (36.5  C)   Ht 1.905 m (6' 3\")   Wt 97.5 kg (215 lb)   BMI 26.87 kg/m   Estimated body mass index is 26.87 kg/m  as calculated from the following:    Height as of this encounter: 1.905 m (6' 3\").    Weight as of this encounter: 97.5 kg (215 lb).  Physical Exam  GENERAL: healthy, alert and no distress  NECK: no adenopathy, no asymmetry, masses, or scars and thyroid normal to palpation  RESP: lungs clear to auscultation - no rales, rhonchi or wheezes  CV: regular rate and rhythm, normal S1 S2, no S3 or S4, no murmur, click or rub, no peripheral edema and peripheral pulses strong  ABDOMEN: soft, nontender, no hepatosplenomegaly, no masses and bowel sounds normal  RECTAL (male): normal sphincter tone, no rectal masses, prostate normal size, smooth, nontender without nodules or masses  MS: no gross musculoskeletal defects noted, no edema  NEURO: Normal strength and tone, mentation intact and speech normal  PSYCH: mentation appears normal, affect normal/bright        ASSESSMENT / PLAN:   1. Encounter for Medicare annual wellness exam  Patient is doing well.  I congratulated him on his weight loss.  I have recommended a abdominal aorta screen per Medicare guidelines and PTF guidelines.  He is otherwise up-to-date on his health maintenance.  Continue active healthy lifestyle.  - US Aorta Medicare AAA Screening; Future    2. Need for hepatitis C screening test    - Hepatitis C Screen Reflex to HCV RNA Quant and Genotype; Future  - Hepatitis C Screen Reflex to HCV RNA Quant and Genotype    3. Right inguinal hernia  He will be getting a surgery next month he will return for preop.    4. Elevated PSA  History of elevated PSA.  He has had biopsies and he does have some BPH symptoms.  Stable however.  We discussed avoidance of alcohol and caffeine at night.  PSA today.  - Comprehensive metabolic panel " "(BMP + Alb, Alk Phos, ALT, AST, Total. Bili, TP); Future  - PSA, tumor marker; Future  - UA Macro with Reflex to Micro and Culture - lab collect; Future  - CBC with platelets; Future  - Comprehensive metabolic panel (BMP + Alb, Alk Phos, ALT, AST, Total. Bili, TP)  - PSA, tumor marker  - UA Macro with Reflex to Micro and Culture - lab collect  - CBC with platelets    5. Benign prostatic hyperplasia with nocturia  As above.  - PSA, tumor marker; Future  - UA Macro with Reflex to Micro and Culture - lab collect; Future  - PSA, tumor marker  - UA Macro with Reflex to Micro and Culture - lab collect    6. Encounter for screening for cardiovascular disorders  Lipids have looked good in the past.  Due to age she does qualify for statin for primary prevention but there is no coronary disease in his family and no other cardiac risk factors.  Mother lived to nearly 100 and father never had coronary disease either.  Will await labs and go from there.  - Lipid panel reflex to direct LDL Fasting; Future  - Lipid panel reflex to direct LDL Fasting    Patient has been advised of split billing requirements and indicates understanding: Yes  COUNSELING:  Reviewed preventive health counseling, as reflected in patient instructions    Estimated body mass index is 26.87 kg/m  as calculated from the following:    Height as of this encounter: 1.905 m (6' 3\").    Weight as of this encounter: 97.5 kg (215 lb).        He reports that he has never smoked. He has never used smokeless tobacco.      Appropriate preventive services were discussed with this patient, including applicable screening as appropriate for cardiovascular disease, diabetes, osteopenia/osteoporosis, and glaucoma.  As appropriate for age/gender, discussed screening for colorectal cancer, prostate cancer, breast cancer, and cervical cancer. Checklist reviewing preventive services available has been given to the patient.    Reviewed patients plan of care and provided an AVS. " The Basic Care Plan (routine screening as documented in Health Maintenance) for Omar meets the Care Plan requirement. This Care Plan has been established and reviewed with the Patient.    Counseling Resources:  ATP IV Guidelines  Pooled Cohorts Equation Calculator  Breast Cancer Risk Calculator  Breast Cancer: Medication to Reduce Risk  FRAX Risk Assessment  ICSI Preventive Guidelines  Dietary Guidelines for Americans, 2010  [employer]'s MyPlate  ASA Prophylaxis  Lung CA Screening    PERICO HERRERA MD  [facility]    Identified Health Risks:

## 2022-01-05 NOTE — LETTER
January 11, 2022      Omar Perez  [address] [address]        Dear ,    We are writing to inform you of your test results.    Omar, your hepatitis C test is negative.  PSA is probably stable.  I know we briefly discussed medications for cholesterol at your visit.  Your cholesterol is very good, however, based upon your age you do qualify to start a cholesterol-lowering agent for primary prevention of heart attack and stroke.  Would that be something that you would be interested in?  We can discuss it further at your preop in a few weeks.       Resulted Orders   Hepatitis C Screen Reflex to HCV RNA Quant and Genotype   Result Value Ref Range    Hepatitis C Antibody Nonreactive Nonreactive    Narrative    Assay performance characteristics have not been established for newborns, infants, and children.   Lipid panel reflex to direct LDL Fasting   Result Value Ref Range    Cholesterol 163 <=199 mg/dL    Triglycerides 141 <=149 mg/dL    Direct Measure HDL 45 >=40 mg/dL      Comment:      HDL Cholesterol Reference Range:     0-2 years:   No reference ranges established for patients under 2 years old  at [facility] for lipid analytes.    2-8 years:  Greater than 45 mg/dL     18 years and older:   Female: Greater than or equal to 50 mg/dL   Male:   Greater than or equal to 40 mg/dL    LDL Cholesterol Calculated 90 <=129 mg/dL    Patient Fasting > 8hrs? No    Comprehensive metabolic panel (BMP + Alb, Alk Phos, ALT, AST, Total. Bili, TP)   Result Value Ref Range    Sodium 138 136 - 145 mmol/L    Potassium 4.6 3.5 - 5.0 mmol/L    Chloride 102 98 - 107 mmol/L    Carbon Dioxide (CO2) 29 22 - 31 mmol/L    Anion Gap 7 5 - 18 mmol/L    Urea Nitrogen 18 8 - 28 mg/dL    Creatinine 1.04 0.70 - 1.30 mg/dL    Calcium 10.1 8.5 - 10.5 mg/dL    Glucose 87 70 - 125 mg/dL    Alkaline Phosphatase 77 45 - 120 U/L    AST 17 0 - 40 U/L    ALT 15 0 - 45 U/L    Protein Total 7.6 6.0 - 8.0 g/dL     Albumin 4.0 3.5 - 5.0 g/dL    Bilirubin Total 0.3 0.0 - 1.0 mg/dL    GFR Estimate 76 >60 mL/min/1.73m2      Comment:      Effective December 21, 2021 eGFRcr in adults is calculated using the 2021 CKD-EPI creatinine equation which includes age and gender (Faviola ng al., NE, DOI: 10.1056/ROVXwk0887188)   PSA, tumor marker   Result Value Ref Range    PSA Tumor Marker 5.70 0.00 - 6.50 ug/L    Narrative    Assay Method is Abbott Prostate-Specific Antigen (PSA)  Standard-WHO 1st International (90:10)   UA Macro with Reflex to Micro and Culture - lab collect   Result Value Ref Range    Color Urine Yellow Colorless, Straw, Light Yellow, Yellow    Appearance Urine Clear Clear    Glucose Urine Negative Negative mg/dL    Bilirubin Urine Negative Negative    Ketones Urine Negative Negative mg/dL    Specific Gravity Urine 1.020 1.005 - 1.030    Blood Urine Negative Negative    pH Urine 6.5 5.0 - 8.0    Protein Albumin Urine Negative Negative mg/dL    Urobilinogen Urine 0.2 0.2, 1.0 E.U./dL    Nitrite Urine Negative Negative    Leukocyte Esterase Urine Negative Negative    Narrative    Microscopic not indicated   CBC with platelets   Result Value Ref Range    WBC Count 5.8 4.0 - 11.0 10e3/uL    RBC Count 4.81 4.40 - 5.90 10e6/uL    Hemoglobin 14.6 13.3 - 17.7 g/dL    Hematocrit 44.8 40.0 - 53.0 %    MCV 93 78 - 100 fL    MCH 30.4 26.5 - 33.0 pg    MCHC 32.6 31.5 - 36.5 g/dL    RDW 12.7 10.0 - 15.0 %    Platelet Count 240 150 - 450 10e3/uL       If you have any questions or concerns, please call the clinic at the number listed above.       Sincerely,      Shorty Hernandez MD

## 2022-01-06 LAB
ALBUMIN SERPL-MCNC: 4 G/DL (ref 3.5–5)
ALP SERPL-CCNC: 77 U/L (ref 45–120)
ALT SERPL W P-5'-P-CCNC: 15 U/L (ref 0–45)
ANION GAP SERPL CALCULATED.3IONS-SCNC: 7 MMOL/L (ref 5–18)
AST SERPL W P-5'-P-CCNC: 17 U/L (ref 0–40)
BILIRUB SERPL-MCNC: 0.3 MG/DL (ref 0–1)
BUN SERPL-MCNC: 18 MG/DL (ref 8–28)
CALCIUM SERPL-MCNC: 10.1 MG/DL (ref 8.5–10.5)
CHLORIDE BLD-SCNC: 102 MMOL/L (ref 98–107)
CHOLEST SERPL-MCNC: 163 MG/DL
CO2 SERPL-SCNC: 29 MMOL/L (ref 22–31)
CREAT SERPL-MCNC: 1.04 MG/DL (ref 0.7–1.3)
FASTING STATUS PATIENT QL REPORTED: NO
GFR SERPL CREATININE-BSD FRML MDRD: 76 ML/MIN/1.73M2
GLUCOSE BLD-MCNC: 87 MG/DL (ref 70–125)
HCV AB SERPL QL IA: NONREACTIVE
HDLC SERPL-MCNC: 45 MG/DL
LDLC SERPL CALC-MCNC: 90 MG/DL
POTASSIUM BLD-SCNC: 4.6 MMOL/L (ref 3.5–5)
PROT SERPL-MCNC: 7.6 G/DL (ref 6–8)
PSA SERPL-MCNC: 5.7 UG/L (ref 0–6.5)
SODIUM SERPL-SCNC: 138 MMOL/L (ref 136–145)
TRIGL SERPL-MCNC: 141 MG/DL

## 2022-01-06 NOTE — PATIENT INSTRUCTIONS
Patient Education   Personalized Prevention Plan  You are due for the preventive services outlined below.  Your care team is available to assist you in scheduling these services.  If you have already completed any of these items, please share that information with your care team to update in your medical record.  Health Maintenance Due   Topic Date Due     Hepatitis C Screening  Never done     AORTIC ANEURYSM SCREENING (SYSTEM ASSIGNED)  Never done     FALL RISK ASSESSMENT  09/10/2021       Understanding USDA MyPlate  The USDA has guidelines to help you make healthy food choices. These are called MyPlate. MyPlate shows the food groups that make up healthy meals using the image of a place setting. Before you eat, think about the healthiest choices for what to put on your plate or in your cup or bowl. To learn more about building a healthy plate, visit www.choosemyplate.gov.    The food groups    Fruits. Any fruit or 100% fruit juice counts as part of the Fruit Group. Fruits may be fresh, canned, frozen, or dried, and may be whole, cut-up, or pureed. Make 1/2 of your plate fruits and vegetables.    Vegetables. Any vegetable or 100% vegetable juice counts as a member of the Vegetable Group. Vegetables may be fresh, frozen, canned, or dried. They can be served raw or cooked and may be whole, cut-up, or mashed. Make 1/2 of your plate fruits and vegetables.    Grains. All foods made from grains are part of the Grains Group. These include wheat, rice, oats, cornmeal, and barley. Grains are often used to make foods such as bread, pasta, oatmeal, cereal, tortillas, and grits. Grains should be no more than 1/4 of your plate. At least half of your grains should be whole grains.    Protein. This group includes meat, poultry, seafood, beans and peas, eggs, processed soy products (such as tofu), nuts (including nut butters), and seeds. Make protein choices no more than 1/4 of your plate. Meat and poultry choices should be lean  or low fat.    Dairy. The Dairy Group includes all fluid milk products and foods made from milk that contain calcium, such as yogurt and cheese. (Foods that have little calcium, such as cream, butter, and cream cheese, are not part of this group.) Most dairy choices should be low-fat or fat-free.    Oils. Oils aren't a food group, but they do contain essential nutrients. However it's important to watch your intake of oils. These are fats that are liquid at room temperature. They include canola, corn, olive, soybean, vegetable, and sunflower oil. Foods that are mainly oil include mayonnaise, certain salad dressings, and soft margarines. You likely already get your daily oil allowance from the foods you eat.  Things to limit  Eating healthy also means limiting these things in your diet:       Salt (sodium). Many processed foods have a lot of sodium. To keep sodium intake down, eat fresh vegetables, meats, poultry, and seafood when possible. Purchase low-sodium, reduced-sodium, or no-salt-added food products at the store. And don't add salt to your meals at home. Instead, season them with herbs and spices such as dill, oregano, cumin, and paprika. Or try adding flavor with lemon or lime zest and juice.    Saturated fat. Saturated fats are most often found in animal products such as beef, pork, and chicken. They are often solid at room temperature, such as butter. To reduce your saturated fat intake, choose leaner cuts of meat and poultry. And try healthier cooking methods such as grilling, broiling, roasting, or baking. For a simple lower-fat swap, use plain nonfat yogurt instead of mayonnaise when making potato salad or macaroni salad.    Added sugars. These are sugars added to foods. They are in foods such as ice cream, candy, soda, fruit drinks, sports drinks, energy drinks, cookies, pastries, jams, and syrups. Cut down on added sugars by sharing sweet treats with a family member or friend. You can also choose fruit  for dessert, and drink water or other unsweetened beverages.     [email or web address] last reviewed this educational content on 6/1/2020 2000-2021 [employer]. All rights reserved. This information is not intended as a substitute for professional medical care. Always follow your healthcare professional's instructions.

## 2022-01-17 ENCOUNTER — HOSPITAL ENCOUNTER (OUTPATIENT)
Dept: ULTRASOUND IMAGING | Facility: CLINIC | Age: 72
Discharge: HOME | End: 2022-01-17
Attending: INTERNAL MEDICINE | Admitting: INTERNAL MEDICINE
Payer: COMMERCIAL

## 2022-01-17 DIAGNOSIS — Z00.00 ENCOUNTER FOR MEDICARE ANNUAL WELLNESS EXAM: ICD-10-CM

## 2022-01-17 PROCEDURE — 76706 US ABDL AORTA SCREEN AAA: CPT

## 2022-02-03 ENCOUNTER — OFFICE VISIT (OUTPATIENT)
Dept: INTERNAL MEDICINE | Facility: CLINIC | Age: 72
End: 2022-02-03
Payer: COMMERCIAL

## 2022-02-03 VITALS
BODY MASS INDEX: 26.98 KG/M2 | OXYGEN SATURATION: 96 % | SYSTOLIC BLOOD PRESSURE: 118 MMHG | HEIGHT: 75 IN | HEART RATE: 60 BPM | WEIGHT: 217 LBS | DIASTOLIC BLOOD PRESSURE: 60 MMHG

## 2022-02-03 DIAGNOSIS — Z01.818 PREOPERATIVE EXAMINATION: Primary | ICD-10-CM

## 2022-02-03 DIAGNOSIS — K40.90 RIGHT INGUINAL HERNIA: ICD-10-CM

## 2022-02-03 DIAGNOSIS — Z13.6 ENCOUNTER FOR SCREENING FOR CORONARY ARTERY DISEASE: ICD-10-CM

## 2022-02-03 LAB
ATRIAL RATE - MUSE: 61 BPM
DIASTOLIC BLOOD PRESSURE - MUSE: NORMAL MMHG
INTERPRETATION ECG - MUSE: NORMAL
P AXIS - MUSE: 56 DEGREES
PR INTERVAL - MUSE: 170 MS
QRS DURATION - MUSE: 88 MS
QT - MUSE: 390 MS
QTC - MUSE: 392 MS
R AXIS - MUSE: -39 DEGREES
SYSTOLIC BLOOD PRESSURE - MUSE: NORMAL MMHG
T AXIS - MUSE: 19 DEGREES
VENTRICULAR RATE- MUSE: 61 BPM

## 2022-02-03 PROCEDURE — 93005 ELECTROCARDIOGRAM TRACING: CPT | Performed by: INTERNAL MEDICINE

## 2022-02-03 PROCEDURE — 99214 OFFICE O/P EST MOD 30 MIN: CPT | Performed by: INTERNAL MEDICINE

## 2022-02-03 ASSESSMENT — MIFFLIN-ST. JEOR: SCORE: 1819.94

## 2022-02-03 NOTE — PATIENT INSTRUCTIONS
Preparing for Your Surgery  Getting started  A nurse will call you to review your health history and instructions. They will give you an arrival time based on your scheduled surgery time. Please be ready to share:    Your doctor's clinic name and phone number    Your medical, surgical and anesthesia history    A list of allergies and sensitivities    A list of medicines, including herbal treatments and over-the-counter drugs    Whether the patient has a legal guardian (ask how to send us the papers in advance)  Please tell us if you're pregnant--or if there's any chance you might be pregnant. Some surgeries may injure a fetus (unborn baby), so they require a pregnancy test. Surgeries that are safe for a fetus don't always need a test, and you can choose whether to have one.   If you have a child who's having surgery, please ask for a copy of Preparing for Your Child's Surgery.    Preparing for surgery    Within 30 days of surgery: Have a pre-op exam (sometimes called an H&P, or History and Physical). This can be done at a clinic or pre-operative center.  ? If you're having a , you may not need this exam. Talk to your care team.    At your pre-op exam, talk to your care team about all medicines you take. If you need to stop any medicines before surgery, ask when to start taking them again.  ? We do this for your safety. Many medicines can make you bleed too much during surgery. Some change how well surgery (anesthesia) drugs work.    Call your insurance company to let them know you're having surgery. (If you don't have insurance, call [phone].)    Call your clinic if there's any change in your health. This includes signs of a cold or flu (sore throat, runny nose, cough, rash, fever). It also includes a scrape or scratch near the surgery site.    If you have questions on the day of surgery, call your hospital or surgery center.  COVID testing  You may need to be tested for COVID-19 before having  surgery. If so, your surgical team will give you instructions for scheduling this test, separate from your preoperative history and physical.  Eating and drinking guidelines  For your safety: Unless your surgeon tells you otherwise, follow the guidelines below.    Eat and drink as usual until 8 hours before surgery. After that, no food or milk.    Drink clear liquids until 2 hours before surgery. These are liquids you can see through, like water, Gatorade and Propel Water. You may also have black coffee and tea (no cream or milk).    Nothing by mouth within 2 hours of surgery. This includes gum, candy and breath mints.    If you drink alcohol: Stop drinking it the night before surgery.    If your care team tells you to take medicine on the morning of surgery, it's okay to take it with a sip of water.  Preventing infection    Shower or bathe the night before and morning of your surgery. Follow the instructions your clinic gave you. (If no instructions, use regular soap.)    Don't shave or clip hair near your surgery site. We'll remove the hair if needed.    Don't smoke or vape the morning of surgery. You may chew nicotine gum up to 2 hours before surgery. A nicotine patch is okay.  ? Note: Some surgeries require you to completely quit smoking and nicotine. Check with your surgeon.    Your care team will make every effort to keep you safe from infection. We will:  ? Clean our hands often with soap and water (or an alcohol-based hand rub).  ? Clean the skin at your surgery site with a special soap that kills germs.  ? Give you a special gown to keep you warm. (Cold raises the risk of infection.)  ? Wear special hair covers, masks, gowns and gloves during surgery.  ? Give antibiotic medicine, if prescribed. Not all surgeries need antibiotics.  What to bring on the day of surgery    Photo ID and insurance card    Copy of your health care directive, if you have one    Glasses and hearing aides (bring cases)  ? You can't  wear contacts during surgery    Inhaler and eye drops, if you use them (tell us about these when you arrive)    CPAP machine or breathing device, if you use them    A few personal items, if spending the night    If you have . . .  ? A pacemaker, ICD (cardiac defibrillator) or other implant: Bring the ID card.  ? An implanted stimulator: Bring the remote control.  ? A legal guardian: Bring a copy of the certified (court-stamped) guardianship papers.  Please remove any jewelry, including body piercings. Leave jewelry and other valuables at home.  If you're going home the day of surgery    You must have a responsible adult drive you home. They should stay with you overnight as well.    If you don't have someone to stay with you, and you aren't safe to go home alone, we may keep you overnight. Insurance often won't pay for this.  After surgery  If it's hard to control your pain or you need more pain medicine, please call your surgeon's office.  Questions?   If you have any questions for your care team, list them here: _________________________________________________________________________________________________________________________________________________________________________ ____________________________________ ____________________________________ ____________________________________  For informational purposes only. Not to replace the advice of your health care provider. Copyright   2003, 2019 [facility]. All rights reserved. Clinically reviewed by Amna Hawley MD. [employer] [id number] - REV 07/21.    Before Your Procedure or Hospital Admission  Testing for COVID-19 (Coronavirus)  Thank you for choosing [facility] for your health care needs. This is a very challenging time for everyone. The World Health Organization and the [facility] have declared the COVID-19 virus a pandemic.   Our goal is to keep you and our team here at [facility] safe and healthy. We've taken several  "steps to make this happen. For example:    We screen our staff, care teams and patients for COVID-19.    Everyone at [facility] must wear a mask and stay 6 feet apart.    We are limiting hospital and clinic visitors.  Before you come in  All patients must get tested for COVID-19. Your test needs to happen 2 to 4 days before you check in to the hospital or surgery site.   A clinic scheduler will call about a week in advance to set up a testing time at one of our labs where we'll take a swab of your nose or throat.  Note: If you go to a clinic or pharmacy like [facility] or [facility] for your test, make sure it's a \"RT-PCR\" test, not a \"rapid\" COVID-19 test. (See Questions and Answers below.)  After the test, please stay at home and stay out of contact with other people. It will be harder for you to recover if you get COVID-19 before your treatment.  Please follow all current safety guidelines, including:    Limit trips outside your home.    Limit the number of people you see.    Always wear a mask outside your home.    Use social distancing. (Stay 6 feet away from others whenever you can.)    Wash your hands often.  If your test shows you have COVID-19  If your test is positive, we'll let you know. A positive test means that you have the virus.   We'll probably have to postpone your admission, surgery or procedure. Your doctor will discuss this with you. After that, we'll let you know what to do and when you can reschedule.   We may need to cancel your treatment on short notice for other reasons, too.  If your test shows you DON'T have COVID-19  Even if your test is negative, you may still get COVID-19. It's rare but, sometimes, the test result is wrong. You could also catch the virus after taking the test.   There's a very small chance that you could catch COVID-19 in the hospital or surgery center. [facility] has taken many steps to prevent this from happening.   Day of your surgery or procedure    Please " "come wearing a mask or something else that covers both your nose and mouth.    When you arrive, we'll ask you some questions to find out if you have any signs or symptoms of COVID-19.    Ask your care team if you can have visitors. All visitors must wear masks and will be screened for signs of COVID-19.  ? Even if no visitors are allowed, you can still have with you:    Your legal guardian or legal decision maker    A parent and one other visitor, if you are younger than 18 years old    A partner and a , if you are in labor  ? We might need to teach you about taking care of yourself after surgery. If so, a visitor can come into the hospital to learn about it, too.  ? The rules for visitors change often, depending on how much the virus is spreading. To learn more, see Visiting a Loved One in the Hospital during the COVID-19 Outbreak.  Please call your care team, hospital or surgery center if you have any questions. We thank you for your understanding and for choosing [facility] for your care.   Questions and Answers  Does it matter where I get tested for COVID-19?  Yes. We urge you to get tested at one of our [facility] COVID-19 testing sites. We process these tests in our lab and can get the results quickly. Your [facility] care team needs to get your results before you check in.  What should I do if I can't get tested at [facility]?  You can get tested somewhere else, but you'll need to take these extra steps:  1. Contact your family doctor or clinic to arrange your test.  2. Take the test within 4 days of your surgery or procedure. We can't accept tests older than 4 days.  3. Make sure your doctor or clinic faxes your results to [facility] at [phone].  If we don't get your results in time, we may have to postpone or cancel your treatment.  Ask if you're getting a \"RT-PCR\" COVID-19 test. It should NOT be a \"rapid\" COVID-19 test. Many drug stores use \"rapid\" tests, but " "they may not be as accurate. We don't accept the results of \"rapid\" tests.  For informational purposes only. Not to replace the advice of your health care provider. Copyright   2020 [facility]. All rights reserved. Clinically reviewed by Infection Prevention and the [facility] COVID-19 Clinical Team. [employer] [id number] - Rev 10/07/20.    How to Take Your Medication Before Surgery  - HOLD (do not take) any antiinflammatories for one week prior to surgery--this includes advil, aleve, ibuprofen, aspirin, etc.   - STOP taking all vitamins and herbal supplements 14 days before surgery.     Do not take anything the morning of your procedure.  "

## 2022-02-03 NOTE — PROGRESS NOTES
[facility]  [address] [place] [address]  Phone: [phone]  Fax: [phone]  Primary Provider: Perico Hernandez  Pre-op Performing Provider: PERICO HERNANDEZ      PREOPERATIVE EVALUATION:  Today's date: 2/3/2022    Omar Perez is a 71 year old male who presents for a preoperative evaluation.    Surgical Information:  Surgery/Procedure: Right Hernia Surgery   Surgery Location: [facility]  Surgeon: Dr Luis Antonio Celis  Surgery Date: 2/17/22  Time of Surgery: TBD  Where patient plans to recover: At home with family  Fax number for surgical facility: TBD    Type of Anesthesia Anticipated: to be determined    1. Preoperative examination  Proceed with surgery as planned.  He was given perioperative med management instructions.  - EKG 12-lead, tracing only    2. Right inguinal hernia  As above, proceed with surgery as planned.    3. Encounter for screening for coronary artery disease  He is resistant to starting statin for primary prevention at this time.  I think a CT calcium score is very reasonable.  We will get that ordered for him.  - CT Coronary Calcium Scan; Future    Subjective     HPI related to upcoming procedure: Omar is a pleasant gentleman who has been very healthy and also very active who developed a bulge in his groin while lifting this winter.  Found to have a right inguinal hernia needs to have right inguinal hernia repair.  He is feeling well.  He is very active and plans to go skiing in [place] next week.  Also swims over a mile 3 days a week.  He was found to have some mild plaque in his aorta on ultrasound.  He has been resistant to take statin for primary prevention of heart disease.  He would like to do a CT calcium score to further risk stratify him.    Preop Questions 1/31/2022   1. Have you ever had a heart attack or stroke? No   2. Have you ever had surgery on your heart or blood vessels, such as a stent placement, a  coronary artery bypass, or surgery on an artery in your head, neck, heart, or legs? No   3. Do you have chest pain with activity? No   4. Do you have a history of  heart failure? No   5. Do you currently have a cold, bronchitis or symptoms of other infection? No   6. Do you have a cough, shortness of breath, or wheezing? No   7. Do you or anyone in your family have previous history of blood clots? No   8. Do you or does anyone in your family have a serious bleeding problem such as prolonged bleeding following surgeries or cuts? No   9. Have you ever had problems with anemia or been told to take iron pills? No   10. Have you had any abnormal blood loss such as black, tarry or bloody stools? No   11. Have you ever had a blood transfusion? No   12. Are you willing to have a blood transfusion if it is medically needed before, during, or after your surgery? Yes   13. Have you or any of your relatives ever had problems with anesthesia? No   14. Do you have sleep apnea, excessive snoring or daytime drowsiness? No   15. Do you have any artifical heart valves or other implanted medical devices like a pacemaker, defibrillator, or continuous glucose monitor? No   16. Do you have artificial joints? No   17. Are you allergic to latex? No       Health Care Directive:  Patient does not have a Health Care Directive or Living Will: Patient states has Advance Directive and will bring in a copy to clinic.    Preoperative Review of :   reviewed - no record of controlled substances prescribed.      Status of Chronic Conditions:  See problem list for active medical problems.  Problems all longstanding and stable, except as noted/documented.  See ROS for pertinent symptoms related to these conditions.      Review of Systems  Constitutional, neuro, ENT, endocrine, pulmonary, cardiac, gastrointestinal, genitourinary, musculoskeletal, integument and psychiatric systems are negative, except as otherwise noted.    Patient Active Problem  "List    Diagnosis Date Noted     Chronic right-sided low back pain with right-sided sciatica 09/10/2020     Priority: Medium     Elevated PSA 06/19/2018     Priority: Medium     Achilles rupture, right 06/21/2017     Priority: Medium      No past medical history on file.  Past Surgical History:   Procedure Laterality Date     HERNIA REPAIR       Current Outpatient Medications   Medication Sig Dispense Refill     vitamin D3 (CHOLECALCIFEROL) 50 mcg (2000 units) tablet Take 1 tablet by mouth daily         No Known Allergies     Social History     Tobacco Use     Smoking status: Never Smoker     Smokeless tobacco: Never Used   Substance Use Topics     Alcohol use: Yes     Alcohol/week: 2.0 standard drinks     Family History   Problem Relation Age of Onset     Prostate Cancer Father      No Known Problems Daughter      Alcoholism Daughter      History   Drug Use Unknown         Objective     /60 (BP Location: Left arm, Patient Position: Sitting, Cuff Size: Adult Small)   Pulse 60   Ht 1.905 m (6' 3\")   Wt 98.4 kg (217 lb)   SpO2 96%   BMI 27.12 kg/m      Physical Exam    GENERAL APPEARANCE: healthy, alert and no distress     EYES: EOMI,  PERRL     HENT: ear canals and TM's normal     NECK: no adenopathy, no asymmetry, masses, or scars and thyroid normal to palpation     RESP: lungs clear to auscultation - no rales, rhonchi or wheezes     CV: regular rates and rhythm, normal S1 S2, no S3 or S4 and no murmur, click or rub     ABDOMEN:  soft, nontender, no HSM or masses and bowel sounds normal     MS: extremities normal- no gross deformities noted, no evidence of inflammation in joints, FROM in all extremities.     SKIN: no suspicious lesions or rashes     NEURO: Normal strength and tone, sensory exam grossly normal, mentation intact and speech normal     PSYCH: mentation appears normal. and affect normal/bright     LYMPHATICS: No cervical adenopathy    Recent Labs   Lab Test 01/05/22  1655 09/10/20  1348   HGB " 14.6  --      --     137   POTASSIUM 4.6 4.6   CR 1.04 1.18        Diagnostics:  No labs were ordered during this visit.   EKG: appears normal, NSR, left axis deviation, no LVH by voltage criteria, unchanged from previous tracings    Revised Cardiac Risk Index (RCRI):  The patient has the following serious cardiovascular risks for perioperative complications:   - No serious cardiac risks = 0 points     RCRI Interpretation: 0 points: Class I (very low risk - 0.4% complication rate)           Signed Electronically by: PERICO HERRERA MD  Copy of this evaluation report is provided to requesting physician.

## 2022-02-14 ENCOUNTER — HOSPITAL ENCOUNTER (OUTPATIENT)
Dept: CT IMAGING | Facility: CLINIC | Age: 72
Discharge: HOME | End: 2022-02-14
Attending: INTERNAL MEDICINE | Admitting: INTERNAL MEDICINE

## 2022-02-14 DIAGNOSIS — Z13.6 ENCOUNTER FOR SCREENING FOR CORONARY ARTERY DISEASE: ICD-10-CM

## 2022-02-14 LAB
CV CALCIUM SCORE AGATSTON LM: 0
CV CALCIUM SCORING AGATSON LAD: 3
CV CALCIUM SCORING AGATSTON CX: 0
CV CALCIUM SCORING AGATSTON RCA: 0
CV CALCIUM SCORING AGATSTON TOTAL: 3

## 2022-02-14 PROCEDURE — 75571 CT HRT W/O DYE W/CA TEST: CPT

## 2022-02-14 PROCEDURE — 75571 CT HRT W/O DYE W/CA TEST: CPT | Mod: 26 | Performed by: INTERNAL MEDICINE

## 2022-03-29 ENCOUNTER — NURSE TRIAGE (OUTPATIENT)
Dept: NURSING | Facility: CLINIC | Age: 72
End: 2022-03-29
Payer: COMMERCIAL

## 2022-03-29 NOTE — TELEPHONE ENCOUNTER
Patient calling, reports he was sick yesterday, feeling better this morning but took an at home COVID test that resulted positive. Patient reports headache and cough but is otherwise feeling fine. Not concerned with symptoms, requesting quarantine advise. Denies difficulty breathing, confusion and weakness. Home care advice provided per protocol. Call back instructions provided. Patient agreeable to plan.     Breanne Wray RN 03/29/22 10:13 AM    Health Triage Nurse Advisor    Reason for Disposition    [1] COVID-19 infection suspected by caller or triager AND [2] mild symptoms (cough, fever, or others) AND [3] has not gotten tested yet    Additional Information    Negative: SEVERE difficulty breathing (e.g., struggling for each breath, speaks in single words)    Negative: Difficult to awaken or acting confused (e.g., disoriented, slurred speech)    Negative: Bluish (or gray) lips or face now    Negative: Shock suspected (e.g., cold/pale/clammy skin, too weak to stand, low BP, rapid pulse)    Negative: Sounds like a life-threatening emergency to the triager    Negative: [1] Diagnosed or suspected COVID-19 AND [2] symptoms lasting 3 or more weeks    Negative: [1] COVID-19 exposure AND [2] no symptoms    Negative: COVID-19 vaccine reaction suspected (e.g., fever, headache, muscle aches) occurring 1 to 3 days after getting vaccine    Negative: COVID-19 vaccine, questions about    Negative: [1] Lives with someone known to have influenza (flu test positive) AND [2] flu-like symptoms (e.g., cough, runny nose, sore throat, SOB; with or without fever)    Negative: [1] Adult with possible COVID-19 symptoms AND [2] triager concerned about severity of symptoms or other causes    Negative: COVID-19 and breastfeeding, questions about    Negative: SEVERE or constant chest pain or pressure  (Exception: Mild central chest pain, present only when coughing.)    Negative: MODERATE difficulty breathing (e.g., speaks in phrases, SOB  even at rest, pulse 100-120)    Negative: Headache and stiff neck (can't touch chin to chest)    Negative: Oxygen level (e.g., pulse oximetry) 90 percent or lower    Negative: Chest pain or pressure    Negative: Patient sounds very sick or weak to the triager    Negative: MILD difficulty breathing (e.g., minimal/no SOB at rest, SOB with walking, pulse <100)    Negative: Fever > 103 F (39.4 C)    Negative: [1] Fever > 101 F (38.3 C) AND [2] over 60 years of age    Negative: [1] COVID-19 diagnosed AND [2] has mild nausea, vomiting or diarrhea    Protocols used: CORONAVIRUS (COVID-19) DIAGNOSED OR [id number] 1.18.2022

## 2022-06-16 ENCOUNTER — ANCILLARY PROCEDURE (OUTPATIENT)
Dept: GENERAL RADIOLOGY | Facility: CLINIC | Age: 72
End: 2022-06-16
Attending: FAMILY MEDICINE
Payer: COMMERCIAL

## 2022-06-16 ENCOUNTER — OFFICE VISIT (OUTPATIENT)
Dept: ORTHOPEDICS | Facility: CLINIC | Age: 72
End: 2022-06-16

## 2022-06-16 VITALS
BODY MASS INDEX: 27.18 KG/M2 | DIASTOLIC BLOOD PRESSURE: 88 MMHG | HEIGHT: 76 IN | WEIGHT: 223.2 LBS | SYSTOLIC BLOOD PRESSURE: 144 MMHG

## 2022-06-16 DIAGNOSIS — M25.511 ACUTE PAIN OF RIGHT SHOULDER: ICD-10-CM

## 2022-06-16 DIAGNOSIS — S46.011A ROTATOR CUFF STRAIN, RIGHT, INITIAL ENCOUNTER: Primary | ICD-10-CM

## 2022-06-16 DIAGNOSIS — M25.511 SHOULDER PAIN, RIGHT: ICD-10-CM

## 2022-06-16 PROCEDURE — 73030 X-RAY EXAM OF SHOULDER: CPT | Mod: TC | Performed by: RADIOLOGY

## 2022-06-16 PROCEDURE — 99204 OFFICE O/P NEW MOD 45 MIN: CPT | Performed by: FAMILY MEDICINE

## 2022-06-16 NOTE — LETTER
6/16/2022         RE: Omar Perez  [address] [facility] [address]        Dear Colleague,    Thank you for referring your patient, Omar Perez, to the [facility] [place]. Please see a copy of my visit note below.    CHIEF COMPLAINT:  Pain of the Right Shoulder       HISTORY OF PRESENT ILLNESS  Mr. Perez is a pleasant 71 year old year old male who presents to clinic today with right shoulder pain.  Omar explains that 1 month ago he had sudden onset of shoulder pain when lifting a canoe over head while on a canoe trip. He had a previous shoulder injury 3/2022 when he fell skiing. The pain now is in the same location as his previous injury. He is training for a swimming race in August.    Onset: sudden, improving  Location: right shoulder  Quality:  aching, dull and sharp  Duration: 1 months   Timing:intermittent episodes   Modifying factors:  resting and non-use makes it better, movement and use makes it worse  Associated signs & symptoms: lateral sided pain, achy pain after activity, weakness, intermittent clicking, pain with front crawl swimming stroke  Previous similar pain: Yes- fell on right shoulder in March 2022 while skiing.   Treatments to date: ibuprofen     Additional history: Patient is ambidextrous    Review of Systems:    Have you recently had a a fever, chills, weight loss? No    Do you have any vision problems? No    Do you have any chest pain or edema? No    Do you have any shortness of breath or wheezing?  No    Do you have stomach problems? No    Do you have any numbness or focal weakness? No    Do you have diabetes? No    Do you have problems with bleeding or clotting? No    Do you have an rashes or other skin lesions? No    MEDICAL HISTORY  Patient Active Problem List   Diagnosis     Achilles rupture, right     Elevated PSA     Chronic right-sided low back pain with right-sided sciatica       Current Outpatient Medications   Medication Sig  "Dispense Refill     vitamin D3 (CHOLECALCIFEROL) 50 mcg (2000 units) tablet Take 1 tablet by mouth daily         No Known Allergies    Family History   Problem Relation Age of Onset     Prostate Cancer Father      No Known Problems Daughter      Alcoholism Daughter        Additional medical/Social/Surgical histories reviewed in [facility] and updated as appropriate.       PHYSICAL EXAM  BP (!) 144/88   Ht 1.93 m (6' 4\")   Wt 101.2 kg (223 lb 3.2 oz)   BMI 27.17 kg/m      General  - normal appearance, in no obvious distress  Musculoskeletal - Right shoulder  - inspection: normal bone and joint alignment, no obvious deformity, no scapular winging, no AC step-off  - palpation: mildly tender RC insertion, normal clavicle, non-tender AC  - ROM: Full range of motion in all planes of forward elevation, abduction internal and external rotation.  Mild pain with terminal arc of forward elevation and abduction.  No pain with internal or external rotation.  - strength: 5/5  strength, 5/5 internal rotation.  4/5 external rotation, 4/5 supraspinatus strength.  - special tests:  (-) Speed's  (+) Neer  (+) Hawkin's  (+) Aston's  (-) [place]'s  (-) apprehension  (-) subscap lift-off  Neuro  - no sensory or motor deficit, grossly normal coordination, normal muscle tone  Skin  - no ecchymosis, erythema, warmth, or induration, no obvious rash  Psych  - interactive, appropriate, normal mood and affect     IMAGING : X-ray right shoulder 4 view. Final results and radiologist's interpretation, available in the [facility] health record. Images were reviewed with the patient/family members in the office today. My personal interpretation of the performed imaging is mild degenerative changes of AC joint.  No glenohumeral degenerative changes.  No acute osseous abnormalities.     ASSESSMENT & PLAN  Mr. Perez is a 71 year old year old male who presents to clinic today with acute onset of right shoulder pain after attempting to lift an turnover a " aleja 3 weeks ago.  Additional fall and shoulder in March while skiing.    Recent injury most consistent with rotator cuff strain.  He does amount reasonable strength and has full range of motion, therefore I am reassured that he has not experienced a large cuff tear.  We discussed this in detail and demonstrated anatomy through images.    Diagnosis: Rotator cuff strain right shoulder    Treatment options discussed at this time I encouraged him to begin a home exercise program for his rotator cuff.  I believe swimming at this time is placing more strain on his shoulder and continues to cause discomfort.  I did place a formal PT order given his higher level of athletic ability and interest in returning to high-level swimming.  He will hold off on this and determine its necessity in about 2 weeks after HEP is underway.  Also discussed using ibuprofen for analgesia.    May consider ultrasound-guided subacromial injection at next visit versus advanced imaging if no significant provement is been met in the next 6 weeks.    Follow-up in 6 weeks    46 minutes on date of the encounter doing chart review, history and examination, independent imaging review, documentation, and additional activities noted above.      It was a pleasure seeing Omar today.    Brenton Thornton DO, [facility]  Primary Care Sports Medicine      Again, thank you for allowing me to participate in the care of your patient.        Sincerely,        Brenton Thornton DO

## 2022-06-16 NOTE — PATIENT INSTRUCTIONS
Thank you for choosing [facility]    [facility]  [place] [address] [address] [facility], [address]th [place]   [place], [place], [address] [place] [address]   [phone]       APPOINTMENTS: [phone]    CARE QUESTIONS: [phone],    BILLING QUESTIONS: [phone]    FAX NUMBER: [phone]        Follow up: 4-6 weeks as needed      1. Rotator cuff strain, right, initial encounter    2. Shoulder pain, right        Physical Therapy orders have been placed with [facility] (formally [facility])  You can call [phone] to schedule at your convenience.     Rotator Cuff Injury     WHAT IS A ROTATOR CUFF INJURY?    A rotator cuff injury is irritation of or damage to the group of tendons and muscles that hold your shoulder joint together. Tendons are strong bands of tissue that attach muscle to bone. You use the muscles and tendons in your shoulder joint to move your shoulder and raise your arm over your head.        WHAT IS THE CAUSE?    A rotator cuff injury can be caused by:    Overuse of your shoulder in a sport or work activity that involves repetitive overhead movement of your shoulder, like swimming, baseball (mainly pitching), football, tennis, painting, plastering, or housework.  A sudden activity that twists your shoulder or tears your tendon, such as using your arm to break a fall, falling onto your arm, or lifting a heavy object  You may be at higher risk for a rotator cuff injury if you have poor head and shoulder posture, especially if you are older.    WHAT ARE THE SYMPTOMS?    Symptoms may include:    Pain and weakness in your arm and shoulder  Loss of shoulder movement, especially when you try to raise your arm overhead    HOW IS IT DIAGNOSED?    Your healthcare provider will ask about your symptoms, activities, and medical history and examine you. You may have  X-rays or other scans or procedures, such as:    An ultrasound, which uses sound waves to show pictures of your shoulder joint  An MRI, which uses a strong magnetic field and radio waves to show detailed pictures of your shoulder joint  An arthrogram, which is an X-ray or MRI taken after a dye is injected into your joint to outline its shape  Arthroscopy, which is a type of surgery done with a small scope inserted into your joint so your provider can look directly at your joint    HOW IS IT TREATED?    You will need to change or stop doing the activities that cause pain until your injury has healed. For example, avoid strenuous activity or any overhead motion that causes pain. Also, try to make sure that you are practicing good posture and are not slouching forward.    Your healthcare provider may recommend stretching and strengthening exercises to help you heal.    If you have a bad tear, you may need to have it repaired with surgery. After surgery, your treatment plan will include physical therapy to strengthen your shoulder as it heals.    The pain often gets better within a few weeks with self-care, but some injuries may take several months or longer to heal. It s important to follow all of your healthcare provider s instructions.    HOW CAN I TAKE CARE OF MYSELF?    To keep swelling down and help relieve pain:    Put an ice pack, gel pack, or package of frozen vegetables wrapped in a cloth on the area every 3 to 4 hours for up to 20 minutes at a time.  Take nonprescription pain medicine, such as acetaminophen, ibuprofen, or naproxen. Nonsteroidal anti-inflammatory medicines (NSAIDs), such as ibuprofen and naproxen, may cause stomach bleeding and other problems. These risks increase with age. Read the label and take as directed. Unless recommended by your healthcare provider, you should not take this medicine for more than 10 days.  Moist heat may help relieve pain, relax your muscles, and make it easier to move  your arm and shoulder. Put moist heat on the injured area for 10 to 15 minutes before you do warm-up and stretching exercises. Moist heat includes heat patches or moist heating pads that you can buy at most drugstores, a warm wet washcloth, or a hot shower. To prevent burns to your skin, follow directions on the package and do not lie on any type of hot pad. Don t use heat if you have swelling.    Follow your healthcare provider's instructions, including any exercises recommended by your provider. Ask your provider:    How and when you will hear your test results  How long it will take to recover  What activities you should avoid, including how much you can lift, and when you can return to your normal activities  How to take care of yourself at home  What symptoms or problems you should watch for and what to do if you have them  Make sure you know when you should come back for a checkup.    HOW CAN I HELP PREVENT A ROTATOR CUFF INJURY?    Warm-up exercises and stretching before activities can help prevent injuries. For example, do exercises that strengthen your shoulder muscles. If your arm or shoulder hurts after exercise, putting ice on it may help keep it from getting injured.    Follow safety rules and use any protective equipment recommended for your work or sport.    Avoid activities that cause pain. For example, avoid lifting heavy objects over your head.    Developed by [employer].  Published by [employer].  Copyright  2014 [employer] and/or one of its subsidiaries. All rights reserved.    Rotator Cuff Exercises    Isometric shoulder external rotation:  a doorway with your elbow bent 90 degrees and the back of the wrist on your injured side pressed against the door frame. Try to press your hand outward into the door frame. Hold for 5 seconds. Do 2 sets of 15.    Isometric shoulder internal rotation:  a doorway with your elbow bent 90 degrees and the front of the wrist on your  injured side pressed against the door frame. Try to press your palm into the door frame. Hold for 5 seconds. Do 2 sets of 15.  Wand exercise, flexion: Stand upright and hold a stick in both hands, palms down. Stretch your arms by lifting them over your head, keeping your arms straight. Hold for 5 seconds and return to the starting position. Repeat 10 times.    Wand exercise, extension: Stand upright and hold a stick in both hands behind your back. Move the stick away from your back. Hold this position for 5 seconds. Relax and return to the starting position. Repeat 10 times.  Wand exercise, external rotation: Lie on your back and hold a stick in both hands, palms up. Your upper arms should be resting on the floor with your elbows at your sides and bent 90 degrees. Use your uninjured arm to push your injured arm out away from your body. Keep the elbow of your injured arm at your side while it is being pushed. Hold the stretch for 5 seconds. Repeat 10 times.    Wand exercise, shoulder abduction and adduction: Stand and hold a stick with both hands, palms facing away from your body. Rest the stick against the front of your thighs. Use your uninjured arm to push your injured arm out to the side and up as high as possible. Keep your arms straight. Hold for 5 seconds. Repeat 10 times.    Resisted shoulder external rotation: Stand sideways next to a door with your injured arm farther from the door. Tie a knot in the end of the tubing and shut the knot in the door at waist level (or use cable weight machine at gym). Hold the other end of the tubing with the hand of your injured arm. Rest the hand of your injured arm across your stomach. Keeping your elbow in at your side, rotate your arm outward and away from your waist. Slowly return your arm to the starting position. Make sure you keep your elbow bent 90 degrees and your forearm parallel to the floor. Repeat 10 times. Build up to 2 sets of 15.    Resisted shoulder  internal rotation: Stand sideways next to a door with your injured arm closest to the door. Tie a knot in the end of the tubing and shut the knot in the door at waist level (or use cable weight machine at gym). Hold the other end of the tubing with the hand of your injured arm. Bend the elbow of your injured arm 90 degrees. Keeping your elbow in at your side, rotate your forearm across your body and then slowly back to the starting position. Make sure you keep your forearm parallel to the floor. Do 2 sets of 8 to 12.    Scaption: Stand with your arms at your sides and with your elbows straight. Slowly raise your arms to eye level. As you raise your arms, spread them apart so that they are only slightly in front of your body (at about a 30-degree angle to the front of your body). Point your thumbs toward the ceiling. Hold for 2 seconds and lower your arms slowly. Do 2 sets of 15. Progress to holding a soup can or light weight when you are doing the exercise and increase the weight as the exercise gets easier.    Side-lying external rotation: Lie on your uninjured side with your injured arm at your side and your elbow bent 90 degrees. Keeping your elbow against your side, raise your forearm toward the ceiling and hold for 2 seconds. Slowly lower your arm. Do 2 sets of 15. You can start doing this exercise holding a soup can or light weight and gradually increase the weight as long as there is no pain.    Horizontal abduction: Lie on your stomach on a table or the edge of a bed with the arm on your injured side hanging down over the edge. Raise your arm out to the side, with your thumb pointed toward the ceiling, until your arm is parallel to the floor. Hold for 2 seconds and then lower it slowly. Start this exercise with no weight. As you get stronger, add a light weight or hold a soup can. Do 2 sets of 15.    Push-up with a plus: Begin on the floor on your hands and knees. Keep your hands a shoulder width apart and  "lift your feet off the floor. Arch your back as high as possible and round your shoulders (this is the \"plus\" part or the exercise). Bend your elbows and lower your body to the floor. Return to the starting position and arch your back again. Do 2 sets of 15.          "

## 2022-06-16 NOTE — PROGRESS NOTES
CHIEF COMPLAINT:  Pain of the Right Shoulder       HISTORY OF PRESENT ILLNESS  Mr. Perez is a pleasant 71 year old year old male who presents to clinic today with right shoulder pain.  Omar explains that 1 month ago he had sudden onset of shoulder pain when lifting a canoe over head while on a canoe trip. He had a previous shoulder injury 3/2022 when he fell skiing. The pain now is in the same location as his previous injury. He is training for a swimming race in August.    Onset: sudden, improving  Location: right shoulder  Quality:  aching, dull and sharp  Duration: 1 months   Timing:intermittent episodes   Modifying factors:  resting and non-use makes it better, movement and use makes it worse  Associated signs & symptoms: lateral sided pain, achy pain after activity, weakness, intermittent clicking, pain with front crawl swimming stroke  Previous similar pain: Yes- fell on right shoulder in March 2022 while skiing.   Treatments to date: ibuprofen     Additional history: Patient is ambidextrous    Review of Systems:    Have you recently had a a fever, chills, weight loss? No    Do you have any vision problems? No    Do you have any chest pain or edema? No    Do you have any shortness of breath or wheezing?  No    Do you have stomach problems? No    Do you have any numbness or focal weakness? No    Do you have diabetes? No    Do you have problems with bleeding or clotting? No    Do you have an rashes or other skin lesions? No    MEDICAL HISTORY  Patient Active Problem List   Diagnosis     Achilles rupture, right     Elevated PSA     Chronic right-sided low back pain with right-sided sciatica       Current Outpatient Medications   Medication Sig Dispense Refill     vitamin D3 (CHOLECALCIFEROL) 50 mcg (2000 units) tablet Take 1 tablet by mouth daily         No Known Allergies    Family History   Problem Relation Age of Onset     Prostate Cancer Father      No Known Problems Daughter      Alcoholism Daughter   "      Additional medical/Social/Surgical histories reviewed in [facility] and updated as appropriate.       PHYSICAL EXAM  BP (!) 144/88   Ht 1.93 m (6' 4\")   Wt 101.2 kg (223 lb 3.2 oz)   BMI 27.17 kg/m      General  - normal appearance, in no obvious distress  Musculoskeletal - Right shoulder  - inspection: normal bone and joint alignment, no obvious deformity, no scapular winging, no AC step-off  - palpation: mildly tender RC insertion, normal clavicle, non-tender AC  - ROM: Full range of motion in all planes of forward elevation, abduction internal and external rotation.  Mild pain with terminal arc of forward elevation and abduction.  No pain with internal or external rotation.  - strength: 5/5  strength, 5/5 internal rotation.  4/5 external rotation, 4/5 supraspinatus strength.  - special tests:  (-) Speed's  (+) Neer  (+) Hawkin's  (+) Aston's  (-) [place]'s  (-) apprehension  (-) subscap lift-off  Neuro  - no sensory or motor deficit, grossly normal coordination, normal muscle tone  Skin  - no ecchymosis, erythema, warmth, or induration, no obvious rash  Psych  - interactive, appropriate, normal mood and affect     IMAGING : X-ray right shoulder 4 view. Final results and radiologist's interpretation, available in the [facility] health record. Images were reviewed with the patient/family members in the office today. My personal interpretation of the performed imaging is mild degenerative changes of AC joint.  No glenohumeral degenerative changes.  No acute osseous abnormalities.     ASSESSMENT & PLAN  Mr. Perez is a 71 year old year old male who presents to clinic today with acute onset of right shoulder pain after attempting to lift an turnover a canoe 3 weeks ago.  Additional fall and shoulder in March while skiing.    Recent injury most consistent with rotator cuff strain.  He does amount reasonable strength and has full range of motion, therefore I am reassured that he has not experienced a large cuff tear.  " We discussed this in detail and demonstrated anatomy through images.    Diagnosis: Rotator cuff strain right shoulder    Treatment options discussed at this time I encouraged him to begin a home exercise program for his rotator cuff.  I believe swimming at this time is placing more strain on his shoulder and continues to cause discomfort.  I did place a formal PT order given his higher level of athletic ability and interest in returning to high-level swimming.  He will hold off on this and determine its necessity in about 2 weeks after HEP is underway.  Also discussed using ibuprofen for analgesia.    May consider ultrasound-guided subacromial injection at next visit versus advanced imaging if no significant provement is been met in the next 6 weeks.    Follow-up in 6 weeks    46 minutes on date of the encounter doing chart review, history and examination, independent imaging review, documentation, and additional activities noted above.      It was a pleasure seeing Omar today.    Brenton Thornton DO, [facility]  Primary Care Sports Medicine

## 2022-10-01 ENCOUNTER — HEALTH MAINTENANCE LETTER (OUTPATIENT)
Age: 72
End: 2022-10-01

## 2022-10-25 ENCOUNTER — IMMUNIZATION (OUTPATIENT)
Dept: FAMILY MEDICINE | Facility: CLINIC | Age: 72
End: 2022-10-25
Payer: COMMERCIAL

## 2022-10-25 PROCEDURE — 90662 IIV NO PRSV INCREASED AG IM: CPT

## 2022-10-25 PROCEDURE — 91313 COVID-19,PF,MODERNA BIVALENT: CPT

## 2022-10-25 PROCEDURE — 0134A COVID-19,PF,MODERNA BIVALENT: CPT

## 2022-10-25 PROCEDURE — G0008 ADMIN INFLUENZA VIRUS VAC: HCPCS

## 2023-02-05 ENCOUNTER — HEALTH MAINTENANCE LETTER (OUTPATIENT)
Age: 73
End: 2023-02-05

## 2023-03-21 ENCOUNTER — OFFICE VISIT (OUTPATIENT)
Dept: INTERNAL MEDICINE | Facility: CLINIC | Age: 73
End: 2023-03-21
Payer: COMMERCIAL

## 2023-03-21 VITALS
TEMPERATURE: 97.8 F | SYSTOLIC BLOOD PRESSURE: 124 MMHG | HEART RATE: 61 BPM | WEIGHT: 226 LBS | HEIGHT: 76 IN | RESPIRATION RATE: 18 BRPM | OXYGEN SATURATION: 96 % | DIASTOLIC BLOOD PRESSURE: 78 MMHG | BODY MASS INDEX: 27.52 KG/M2

## 2023-03-21 DIAGNOSIS — Z00.00 ENCOUNTER FOR MEDICARE ANNUAL WELLNESS EXAM: Primary | ICD-10-CM

## 2023-03-21 DIAGNOSIS — N40.1 BPH ASSOCIATED WITH NOCTURIA: ICD-10-CM

## 2023-03-21 DIAGNOSIS — E27.9 ADRENAL NODULE (H): ICD-10-CM

## 2023-03-21 DIAGNOSIS — B35.1 ONYCHOMYCOSIS: ICD-10-CM

## 2023-03-21 DIAGNOSIS — I77.810 DILATION OF THORACIC AORTA (H): ICD-10-CM

## 2023-03-21 DIAGNOSIS — R35.1 BPH ASSOCIATED WITH NOCTURIA: ICD-10-CM

## 2023-03-21 DIAGNOSIS — R93.1 AGATSTON CORONARY ARTERY CALCIUM SCORE LESS THAN 100: ICD-10-CM

## 2023-03-21 DIAGNOSIS — R97.20 ELEVATED PSA: ICD-10-CM

## 2023-03-21 LAB
ALBUMIN SERPL BCG-MCNC: 4.3 G/DL (ref 3.5–5.2)
ALBUMIN UR-MCNC: NEGATIVE MG/DL
ALP SERPL-CCNC: 83 U/L (ref 40–129)
ALT SERPL W P-5'-P-CCNC: 14 U/L (ref 10–50)
ANION GAP SERPL CALCULATED.3IONS-SCNC: 9 MMOL/L (ref 7–15)
APPEARANCE UR: CLEAR
AST SERPL W P-5'-P-CCNC: 26 U/L (ref 10–50)
BILIRUB SERPL-MCNC: 0.4 MG/DL
BILIRUB UR QL STRIP: NEGATIVE
BUN SERPL-MCNC: 19.9 MG/DL (ref 8–23)
CALCIUM SERPL-MCNC: 9.7 MG/DL (ref 8.8–10.2)
CHLORIDE SERPL-SCNC: 105 MMOL/L (ref 98–107)
COLOR UR AUTO: YELLOW
CREAT SERPL-MCNC: 1.19 MG/DL (ref 0.67–1.17)
DEPRECATED HCO3 PLAS-SCNC: 24 MMOL/L (ref 22–29)
GFR SERPL CREATININE-BSD FRML MDRD: 64 ML/MIN/1.73M2
GLUCOSE SERPL-MCNC: 89 MG/DL (ref 70–99)
GLUCOSE UR STRIP-MCNC: NEGATIVE MG/DL
HGB UR QL STRIP: NEGATIVE
KETONES UR STRIP-MCNC: NEGATIVE MG/DL
LEUKOCYTE ESTERASE UR QL STRIP: NEGATIVE
NITRATE UR QL: NEGATIVE
PH UR STRIP: 5.5 [PH] (ref 5–8)
POTASSIUM SERPL-SCNC: 4.6 MMOL/L (ref 3.4–5.3)
PROT SERPL-MCNC: 7.4 G/DL (ref 6.4–8.3)
PSA SERPL DL<=0.01 NG/ML-MCNC: 5.15 NG/ML (ref 0–6.5)
SODIUM SERPL-SCNC: 138 MMOL/L (ref 136–145)
SP GR UR STRIP: 1.02 (ref 1–1.03)
UROBILINOGEN UR STRIP-ACNC: 0.2 E.U./DL

## 2023-03-21 PROCEDURE — 84153 ASSAY OF PSA TOTAL: CPT | Performed by: INTERNAL MEDICINE

## 2023-03-21 PROCEDURE — 81003 URINALYSIS AUTO W/O SCOPE: CPT | Performed by: INTERNAL MEDICINE

## 2023-03-21 PROCEDURE — G0439 PPPS, SUBSEQ VISIT: HCPCS | Performed by: INTERNAL MEDICINE

## 2023-03-21 PROCEDURE — 80053 COMPREHEN METABOLIC PANEL: CPT | Performed by: INTERNAL MEDICINE

## 2023-03-21 PROCEDURE — 99214 OFFICE O/P EST MOD 30 MIN: CPT | Mod: 25 | Performed by: INTERNAL MEDICINE

## 2023-03-21 PROCEDURE — 36415 COLL VENOUS BLD VENIPUNCTURE: CPT | Performed by: INTERNAL MEDICINE

## 2023-03-21 ASSESSMENT — ENCOUNTER SYMPTOMS
NAUSEA: 0
HEARTBURN: 0
DIZZINESS: 0
SHORTNESS OF BREATH: 0
DIARRHEA: 0
FREQUENCY: 1
SORE THROAT: 0
FEVER: 0
HEMATURIA: 0
EYE PAIN: 0
WEAKNESS: 0
COUGH: 0
JOINT SWELLING: 0
HEMATOCHEZIA: 0
CONSTIPATION: 0
ARTHRALGIAS: 0
CHILLS: 0
PARESTHESIAS: 0
HEADACHES: 0
NERVOUS/ANXIOUS: 0
ABDOMINAL PAIN: 0
DYSURIA: 0
PALPITATIONS: 0
MYALGIAS: 0

## 2023-03-21 ASSESSMENT — ACTIVITIES OF DAILY LIVING (ADL): CURRENT_FUNCTION: NO ASSISTANCE NEEDED

## 2023-03-21 NOTE — PATIENT INSTRUCTIONS
Patient Education   Personalized Prevention Plan  You are due for the preventive services outlined below.  Your care team is available to assist you in scheduling these services.  If you have already completed any of these items, please share that information with your care team to update in your medical record.  Health Maintenance Due   Topic Date Due     Annual Wellness Visit  01/05/2023     ANNUAL REVIEW OF HM ORDERS  01/05/2023       Understanding USDA MyPlate  The USDA has guidelines to help you make healthy food choices. These are called MyPlate. MyPlate shows the food groups that make up healthy meals using the image of a place setting. Before you eat, think about the healthiest choices for what to put on your plate or in your cup or bowl. To learn more about building a healthy plate, visit www.choosemyplate.gov.     The food groups    Fruits. Any fruit or 100% fruit juice counts as part of the Fruit Group. Fruits may be fresh, canned, frozen, or dried, and may be whole, cut-up, or pureed. Make 1/2 of your plate fruits and vegetables.    Vegetables. Any vegetable or 100% vegetable juice counts as a member of the Vegetable Group. Vegetables may be fresh, frozen, canned, or dried. They can be served raw or cooked and may be whole, cut-up, or mashed. Make 1/2 of your plate fruits and vegetables.    Grains. All foods made from grains are part of the Grains Group. These include wheat, rice, oats, cornmeal, and barley. Grains are often used to make foods such as bread, pasta, oatmeal, cereal, tortillas, and grits. Grains should be no more than 1/4 of your plate. At least half of your grains should be whole grains.    Protein. This group includes meat, poultry, seafood, beans and peas, eggs, processed soy products (such as tofu), nuts (including nut butters), and seeds. Make protein choices no more than 1/4 of your plate. Meat and poultry choices should be lean or low fat.    Dairy. The Dairy Group includes all  fluid milk products and foods made from milk that contain calcium, such as yogurt and cheese. (Foods that have little calcium, such as cream, butter, and cream cheese, are not part of this group.) Most dairy choices should be low-fat or fat-free.    Oils. Oils aren't a food group, but they do contain essential nutrients. However it's important to watch your intake of oils. These are fats that are liquid at room temperature. They include canola, corn, olive, soybean, vegetable, and sunflower oil. Foods that are mainly oil include mayonnaise, certain salad dressings, and soft margarines. You likely already get your daily oil allowance from the foods you eat.  Things to limit  Eating healthy also means limiting these things in your diet:    Salt (sodium). Many processed foods have a lot of sodium. To keep sodium intake down, eat fresh vegetables, meats, poultry, and seafood when possible. Purchase low-sodium, reduced-sodium, or no-salt-added food products at the store. And don't add salt to your meals at home. Instead, season them with herbs and spices such as dill, oregano, cumin, and paprika. Or try adding flavor with lemon or lime zest and juice.    Saturated fat. Saturated fats are most often found in animal products such as beef, pork, and chicken. They are often solid at room temperature, such as butter. To reduce your saturated fat intake, choose leaner cuts of meat and poultry. And try healthier cooking methods such as grilling, broiling, roasting, or baking. For a simple lower-fat swap, use plain nonfat yogurt instead of mayonnaise when making potato salad or macaroni salad.    Added sugars. These are sugars added to foods. They are in foods such as ice cream, candy, soda, fruit drinks, sports drinks, energy drinks, cookies, pastries, jams, and syrups. Cut down on added sugars by sharing sweet treats with a family member or friend. You can also choose fruit for dessert, and drink water or other unsweetened  polina Thompson last reviewed this educational content on 6/1/2020 2000-2022 [employer]. All rights reserved. This information is not intended as a substitute for professional medical care. Always follow your healthcare professional's instructions.          Preventing Falls at Home  A person can fall for many reasons. Older adults may fall because reaction time slows down as we age. Your muscles and joints may get stiff, weak, or less flexible because of illness, medicines, or a physical condition.   Other health problems that make falls more likely include:     Arthritis    Dizziness or lightheadedness when you stand up (orthostatic hypotension)    History of a stroke    Dizziness    Anemia    Certain medicines taken for mental illness or to control blood pressure.    Problems with balance or gait    Bladder or urinary problems    History of falling    Changes in vision (vision impairment)    Changes in thinking skills and memory (cognitive impairment)  Injuries from a fall can include serious injuries such as broken bones, dislocated joints, internal bleeding and cuts. Injuries like these can limit your independence.   Prevention tips  To help prevent falls and fall-related injuries, follow the tips below.    Floors  To make floors safer:     Put nonskid pads under area rugs.    Remove small rugs.    Replace worn floor coverings.    Tack carpets firmly to each step on carpeted stairs. Put nonskid strips on the edges of uncarpeted stairs.    Keep floors and stairs free of clutter and cords.    Arrange furniture so there are clear pathways.    Clean up any spills right away.  Bathrooms    To make bathrooms safer:     Install grab bars in the tub or shower.    Apply nonskid strips or put a nonskid rubber mat in the tub or shower.    Sit on a bath chair to bathe.    Use bathmats with nonskid backing.  Lighting  To improve visibility in your home:      Keep a flashlight in each room. Or put a  lamp next to the bed within easy reach.    Put nightlights in the bedrooms, hallways, kitchen, and bathrooms.    Make sure all stairways have good lighting.    Take your time when going up and down stairs.    Put handrails on both sides of stairs and in walkways for more support. To prevent injury to your wrist or arm, don t use handrails to pull yourself up.    Install grab bars to pull yourself up.    Move or rearrange items that you use often. This will make them easier to find or reach.    Look at your home to find any safety hazards. Especially look at doorways, walkways, and the driveway. Remove or repair any safety problems that you find.  Other changes to make    Look around to find any safety hazards. Look closely at doorways, walkways, and the driveway. Remove or repair any safety problems that you find.    Wear shoes that fit well.    Take your time when going up and down stairs.    Put handrails on both sides of stairs and in walkways for more support. To prevent injury to your wrist or arm, don t use handrails to pull yourself up.    Install grab bars wherever needed to pull yourself up.    Arrange items that you use often. This will make them easier to find or reach.    [employer] last reviewed this educational content on 3/1/2020    0737-2960 [employer]. All rights reserved. This information is not intended as a substitute for professional medical care. Always follow your healthcare professional's instructions.           Patient Education   Personalized Prevention Plan  You are due for the preventive services outlined below.  Your care team is available to assist you in scheduling these services.  If you have already completed any of these items, please share that information with your care team to update in your medical record.  Health Maintenance Due   Topic Date Due     Annual Wellness Visit  01/05/2023       Understanding USDA MyPlate  The USDA has guidelines to help you make healthy  food choices. These are called MyPlate. MyPlate shows the food groups that make up healthy meals using the image of a place setting. Before you eat, think about the healthiest choices for what to put on your plate or in your cup or bowl. To learn more about building a healthy plate, visit www.choosemyplate.gov.     The food groups    Fruits. Any fruit or 100% fruit juice counts as part of the Fruit Group. Fruits may be fresh, canned, frozen, or dried, and may be whole, cut-up, or pureed. Make 1/2 of your plate fruits and vegetables.    Vegetables. Any vegetable or 100% vegetable juice counts as a member of the Vegetable Group. Vegetables may be fresh, frozen, canned, or dried. They can be served raw or cooked and may be whole, cut-up, or mashed. Make 1/2 of your plate fruits and vegetables.    Grains. All foods made from grains are part of the Grains Group. These include wheat, rice, oats, cornmeal, and barley. Grains are often used to make foods such as bread, pasta, oatmeal, cereal, tortillas, and grits. Grains should be no more than 1/4 of your plate. At least half of your grains should be whole grains.    Protein. This group includes meat, poultry, seafood, beans and peas, eggs, processed soy products (such as tofu), nuts (including nut butters), and seeds. Make protein choices no more than 1/4 of your plate. Meat and poultry choices should be lean or low fat.    Dairy. The Dairy Group includes all fluid milk products and foods made from milk that contain calcium, such as yogurt and cheese. (Foods that have little calcium, such as cream, butter, and cream cheese, are not part of this group.) Most dairy choices should be low-fat or fat-free.    Oils. Oils aren't a food group, but they do contain essential nutrients. However it's important to watch your intake of oils. These are fats that are liquid at room temperature. They include canola, corn, olive, soybean, vegetable, and sunflower oil. Foods that are mainly  oil include mayonnaise, certain salad dressings, and soft margarines. You likely already get your daily oil allowance from the foods you eat.  Things to limit  Eating healthy also means limiting these things in your diet:    Salt (sodium). Many processed foods have a lot of sodium. To keep sodium intake down, eat fresh vegetables, meats, poultry, and seafood when possible. Purchase low-sodium, reduced-sodium, or no-salt-added food products at the store. And don't add salt to your meals at home. Instead, season them with herbs and spices such as dill, oregano, cumin, and paprika. Or try adding flavor with lemon or lime zest and juice.    Saturated fat. Saturated fats are most often found in animal products such as beef, pork, and chicken. They are often solid at room temperature, such as butter. To reduce your saturated fat intake, choose leaner cuts of meat and poultry. And try healthier cooking methods such as grilling, broiling, roasting, or baking. For a simple lower-fat swap, use plain nonfat yogurt instead of mayonnaise when making potato salad or macaroni salad.    Added sugars. These are sugars added to foods. They are in foods such as ice cream, candy, soda, fruit drinks, sports drinks, energy drinks, cookies, pastries, jams, and syrups. Cut down on added sugars by sharing sweet treats with a family member or friend. You can also choose fruit for dessert, and drink water or other unsweetened beverages.  [employer] last reviewed this educational content on 6/1/2020 2000-2022 [employer]. All rights reserved. This information is not intended as a substitute for professional medical care. Always follow your healthcare professional's instructions.          Preventing Falls at Home  A person can fall for many reasons. Older adults may fall because reaction time slows down as we age. Your muscles and joints may get stiff, weak, or less flexible because of illness, medicines, or a physical condition.    Other health problems that make falls more likely include:     Arthritis    Dizziness or lightheadedness when you stand up (orthostatic hypotension)    History of a stroke    Dizziness    Anemia    Certain medicines taken for mental illness or to control blood pressure.    Problems with balance or gait    Bladder or urinary problems    History of falling    Changes in vision (vision impairment)    Changes in thinking skills and memory (cognitive impairment)  Injuries from a fall can include serious injuries such as broken bones, dislocated joints, internal bleeding and cuts. Injuries like these can limit your independence.   Prevention tips  To help prevent falls and fall-related injuries, follow the tips below.    Floors  To make floors safer:     Put nonskid pads under area rugs.    Remove small rugs.    Replace worn floor coverings.    Tack carpets firmly to each step on carpeted stairs. Put nonskid strips on the edges of uncarpeted stairs.    Keep floors and stairs free of clutter and cords.    Arrange furniture so there are clear pathways.    Clean up any spills right away.  Bathrooms    To make bathrooms safer:     Install grab bars in the tub or shower.    Apply nonskid strips or put a nonskid rubber mat in the tub or shower.    Sit on a bath chair to bathe.    Use bathmats with nonskid backing.  Lighting  To improve visibility in your home:      Keep a flashlight in each room. Or put a lamp next to the bed within easy reach.    Put nightlights in the bedrooms, hallways, kitchen, and bathrooms.    Make sure all stairways have good lighting.    Take your time when going up and down stairs.    Put handrails on both sides of stairs and in walkways for more support. To prevent injury to your wrist or arm, don t use handrails to pull yourself up.    Install grab bars to pull yourself up.    Move or rearrange items that you use often. This will make them easier to find or reach.    Look at your home to find any  safety hazards. Especially look at doorways, walkways, and the driveway. Remove or repair any safety problems that you find.  Other changes to make    Look around to find any safety hazards. Look closely at doorways, walkways, and the driveway. Remove or repair any safety problems that you find.    Wear shoes that fit well.    Take your time when going up and down stairs.    Put handrails on both sides of stairs and in walkways for more support. To prevent injury to your wrist or arm, don t use handrails to pull yourself up.    Install grab bars wherever needed to pull yourself up.    Arrange items that you use often. This will make them easier to find or reach.    [employer] last reviewed this educational content on 3/1/2020    6608-3465 [employer]. All rights reserved. This information is not intended as a substitute for professional medical care. Always follow your healthcare professional's instructions.

## 2023-03-21 NOTE — PROGRESS NOTES
"SUBJECTIVE:   Omar is a 72 year old who presents for Preventive Visit.    Omar comes in today for his annual wellness reports things have been going fairly well for him.  He stays very active with swimming and skiing.  He had some shoulder issues last year and those have resolved.  He had a couple episodes of headache last year but those also resolved.  He underwent coronary artery calcium scan which showed a score of 3.  As a result he does not wish to take lipid-lowering therapies.  No family history of coronary disease.  He has elevated PSA and BPH.  No change in his urination.  Gets up 2 or 3 times at night.  He still having a lot of issues with his daughter who is a chronic alcoholic.  She has a hard time keeping a job.  His other daughter is an  and is extremely busy.  They have 4 kids out in [place].  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  That keeps him pretty busy.  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    Patient has been advised of split billing requirements and indicates understanding: Yes  Are you in the first 12 months of your Medicare coverage?  No    Healthy Habits:     In general, how would you rate your overall health?  Excellent    Frequency of exercise:  4-5 days/week    Duration of exercise:  30-45 minutes    Do you usually eat at least 4 servings of fruit and vegetables a day, include whole grains    & fiber and avoid regularly eating high fat or \"junk\" foods?  No    Taking medications regularly:  Yes    Medication side effects:  None    Ability to successfully perform activities of daily living:  No assistance needed    Home Safety:  No safety concerns identified    Hearing Impairment:  No hearing concerns    In the past 6 months, have you been bothered by leaking of urine?  No    In general, how would you rate your overall mental or emotional health?  Good      PHQ-2 Total Score: 0    Additional concerns today:  Yes      Have you ever done " Advance Care Planning? (For example, a Health Directive, POLST, or a discussion with a medical provider or your loved ones about your wishes): Yes, patient states has an Advance Care Planning document and will bring a copy to the clinic.       Fall risk  Fallen 2 or more times in the past year?: No  Any fall with injury in the past year?: Yes    Cognitive Screening   1) Repeat 3 items (Leader, Season, Table)    2) Clock draw: NORMAL  3) 3 item recall: Recalls 3 objects  Results: NORMAL CLOCK     Mini-CogTM Copyright S Steve. Licensed by the author for use in [facility]; reprinted with permission (derick@[facility]). All rights reserved.      Do you have sleep apnea, excessive snoring or daytime drowsiness?: no    Reviewed and updated as needed this visit by clinical staff   Tobacco  Allergies  Meds              Reviewed and updated as needed this visit by Provider                 Social History     Tobacco Use     Smoking status: Never     Smokeless tobacco: Never   Substance Use Topics     Alcohol use: Yes     Alcohol/week: 2.0 standard drinks         Alcohol Use 3/21/2023   Prescreen: >3 drinks/day or >7 drinks/week? No       Current providers sharing in care for this patient include:   Patient Care Team:  Shorty Hernandez MD as PCP - General (Internal Medicine)  Shorty Hernandez MD as Assigned PCP  Brenton Thornton DO as Assigned Musculoskeletal Provider    The following health maintenance items are reviewed in Epic and correct as of today:  Health Maintenance   Topic Date Due     MEDICARE ANNUAL WELLNESS VISIT  01/05/2023     ANNUAL REVIEW OF HM ORDERS  01/05/2023     FALL RISK ASSESSMENT  03/21/2024     DTAP/TDAP/TD IMMUNIZATION (2 - Td or Tdap) 09/08/2024     LIPID  01/05/2027     ADVANCE CARE PLANNING  01/05/2027     COLORECTAL CANCER SCREENING  10/20/2030     HEPATITIS C SCREENING  Completed     PHQ-2 (once per calendar year)  Completed     INFLUENZA VACCINE  Completed     Pneumococcal Vaccine: 65+  "Years  Completed     ZOSTER IMMUNIZATION  Completed     AORTIC ANEURYSM SCREENING (SYSTEM ASSIGNED)  Completed     COVID-19 Vaccine  Completed     IPV IMMUNIZATION  Aged Out     MENINGITIS IMMUNIZATION  Aged Out               Review of Systems   Constitutional: Negative for chills and fever.   HENT: Negative for congestion, ear pain, hearing loss and sore throat.    Eyes: Negative for pain and visual disturbance.   Respiratory: Negative for cough and shortness of breath.    Cardiovascular: Negative for chest pain, palpitations and peripheral edema.   Gastrointestinal: Negative for abdominal pain, constipation, diarrhea, heartburn, hematochezia and nausea.   Genitourinary: Positive for frequency. Negative for dysuria, genital sores, hematuria, impotence, penile discharge and urgency.   Musculoskeletal: Negative for arthralgias, joint swelling and myalgias.   Skin: Negative for rash.   Neurological: Negative for dizziness, weakness, headaches and paresthesias.   Psychiatric/Behavioral: Negative for mood changes. The patient is not nervous/anxious.          OBJECTIVE:   /78 (BP Location: Left arm, Patient Position: Sitting, Cuff Size: Adult Regular)   Pulse 61   Temp 97.8  F (36.6  C)   Resp 18   Ht 1.93 m (6' 4\")   Wt 102.5 kg (226 lb)   SpO2 96%   BMI 27.51 kg/m   Estimated body mass index is 27.51 kg/m  as calculated from the following:    Height as of this encounter: 1.93 m (6' 4\").    Weight as of this encounter: 102.5 kg (226 lb).  Physical Exam    Gentleman appears younger than his age.  H&T is unremarkable.  Lungs are clear.  Heart is regular.  Abdomen soft and nontender.  Extremities without edema.  He has onychomycosis of all toes except for his right second toe      ASSESSMENT / PLAN:   1. Encounter for Medicare annual wellness exam  He is up-to-date on his health maintenance and lives an active and healthy lifestyle.  We discussed fall prevention today    2. Dilation of thoracic aorta (H)  We " "will check MRI to evaluate the extent of aortic dilatation to  - MRA Chest with Contrast; Future    3. Elevated PSA  Check PSA.  He has had biopsies in the past.  Thought to be benign prostatic hypertrophy  - PSA, tumor marker; Future    4. BPH associated with nocturia  As above  - Comprehensive metabolic panel (BMP + Alb, Alk Phos, ALT, AST, Total. Bili, TP); Future  - UA Macro with Reflex to Micro and Culture - lab collect; Future  - PSA, tumor marker; Future    5. Onychomycosis  I have urged against another course of treatment.  He is already done 2 courses without any improvement of his symptoms    6. Agatston coronary artery calcium score less than 100--3 in 2022  He wants to forego lipid-lowering therapies or low-dose aspirin.    Patient has been advised of split billing requirements and indicates understanding: Yes      COUNSELING:  Reviewed preventive health counseling, as reflected in patient instructions      BMI:   Estimated body mass index is 27.51 kg/m  as calculated from the following:    Height as of this encounter: 1.93 m (6' 4\").    Weight as of this encounter: 102.5 kg (226 lb).         He reports that he has never smoked. He has never used smokeless tobacco.      Appropriate preventive services were discussed with this patient, including applicable screening as appropriate for cardiovascular disease, diabetes, osteopenia/osteoporosis, and glaucoma.  As appropriate for age/gender, discussed screening for colorectal cancer, prostate cancer, breast cancer, and cervical cancer. Checklist reviewing preventive services available has been given to the patient.    Reviewed patients plan of care and provided an AVS. The Basic Care Plan (routine screening as documented in Health Maintenance) for Omar meets the Care Plan requirement. This Care Plan has been established and reviewed with the Patient.      PERICO HERRERA MD  [facility]    Identified Health Risks:    The patient " was counseled and encouraged to consider modifying their diet and eating habits. He was provided with information on recommended healthy diet options.  He is at risk for falling and has been provided with information to reduce the risk of falling at home.  The patient was counseled and encouraged to consider modifying their diet and eating habits. He was provided with information on recommended healthy diet options.  He is at risk for falling and has been provided with information to reduce the risk of falling at home.

## 2023-03-31 ENCOUNTER — HOSPITAL ENCOUNTER (OUTPATIENT)
Dept: MRI IMAGING | Facility: CLINIC | Age: 73
Discharge: HOME | End: 2023-03-31
Attending: INTERNAL MEDICINE
Payer: COMMERCIAL

## 2023-03-31 DIAGNOSIS — I77.810 DILATION OF THORACIC AORTA (H): ICD-10-CM

## 2023-03-31 PROCEDURE — 71555 MRI ANGIO CHEST W OR W/O DYE: CPT

## 2023-03-31 PROCEDURE — 71552 MRI CHEST W/O & W/DYE: CPT

## 2023-03-31 PROCEDURE — A9585 GADOBUTROL INJECTION: HCPCS | Performed by: INTERNAL MEDICINE

## 2023-03-31 PROCEDURE — 255N000002 HC RX 255 OP 636: Performed by: INTERNAL MEDICINE

## 2023-03-31 RX ORDER — GADOBUTROL 604.72 MG/ML
15 INJECTION INTRAVENOUS ONCE
Status: COMPLETED | OUTPATIENT
Start: 2023-03-31 | End: 2023-03-31

## 2023-03-31 RX ADMIN — GADOBUTROL 15 ML: 604.72 INJECTION INTRAVENOUS at 10:03

## 2023-04-06 ENCOUNTER — HOSPITAL ENCOUNTER (OUTPATIENT)
Dept: CT IMAGING | Facility: CLINIC | Age: 73
Discharge: HOME | End: 2023-04-06
Attending: INTERNAL MEDICINE | Admitting: INTERNAL MEDICINE
Payer: COMMERCIAL

## 2023-04-06 DIAGNOSIS — E27.9 ADRENAL NODULE (H): ICD-10-CM

## 2023-04-06 PROCEDURE — 74150 CT ABDOMEN W/O CONTRAST: CPT

## 2023-04-06 RX ORDER — IOPAMIDOL 755 MG/ML
100 INJECTION, SOLUTION INTRAVASCULAR ONCE
Status: DISCONTINUED | OUTPATIENT
Start: 2023-04-06 | End: 2023-04-07 | Stop reason: HOSPADM

## 2023-06-27 ENCOUNTER — TRANSFERRED RECORDS (OUTPATIENT)
Dept: HEALTH INFORMATION MANAGEMENT | Facility: CLINIC | Age: 73
End: 2023-06-27

## 2023-08-09 ENCOUNTER — NURSE TRIAGE (OUTPATIENT)
Dept: INTERNAL MEDICINE | Facility: CLINIC | Age: 73
End: 2023-08-09
Payer: COMMERCIAL

## 2023-08-09 DIAGNOSIS — U07.1 INFECTION DUE TO 2019 NOVEL CORONAVIRUS: Primary | ICD-10-CM

## 2023-08-09 NOTE — TELEPHONE ENCOUNTER
Nurse Triage SBAR    Is this a 2nd Level Triage? NO    Situation: Patient tested positive for COVID on home test 08/09/23. Symptoms began 08/07/23    Background: Exposure to COVID at group gathering    Assessment: Patient reports improvement in symptoms from 08/08/23. Patient reports congestion, fatigue, and cough. Denies difficulty breathing, SOB, chest pain. Unknown if fever was present since symptom onset; however. Reports chills.     Protocol Recommended Disposition:   Home Care (overriding Discuss With PCP And Callback By Nurse Within 1 Hour)    Recommendation: Care advise given to patient.    Patient requesting Paxlovid treatment. Routing to RN COVID treatment team to complete RN treatment protocol.        Does the patient meet one of the following criteria for ADS visit consideration? No      Reason for Disposition   [1] HIGH RISK for severe COVID complications (e.g., weak immune system, age > 64 years, obesity with BMI of 30 or higher, pregnant, chronic lung disease or other chronic medical condition) AND [2] COVID symptoms (e.g., cough, fever)  (Exceptions: Already seen by PCP and no new or worsening symptoms.)    Additional Information   Negative: SEVERE difficulty breathing (e.g., struggling for each breath, speaks in single words)   Negative: Difficult to awaken or acting confused (e.g., disoriented, slurred speech)   Negative: Bluish (or gray) lips or face now   Negative: Shock suspected (e.g., cold/pale/clammy skin, too weak to stand, low BP, rapid pulse)   Negative: Sounds like a life-threatening emergency to the triager   Negative: [1] Diagnosed or suspected COVID-19 AND [2] symptoms lasting 3 or more weeks   Negative: [1] COVID-19 exposure AND [2] no symptoms   Negative: COVID-19 vaccine reaction suspected (e.g., fever, headache, muscle aches) occurring 1 to 3 days after getting vaccine   Negative: COVID-19 vaccine, questions about   Negative: [1] Lives with someone known to have influenza (flu test  "positive) AND [2] flu-like symptoms (e.g., cough, runny nose, sore throat, SOB; with or without fever)   Negative: [1] Adult with possible COVID-19 symptoms AND [2] triager concerned about severity of symptoms or other causes   Negative: COVID-19 and breastfeeding, questions about   Negative: Chest pain or pressure  (Exception: MILD central chest pain, present only when coughing)   Negative: Patient sounds very sick or weak to the triager   Negative: SEVERE or constant chest pain or pressure  (Exception: Mild central chest pain, present only when coughing.)   Negative: MODERATE difficulty breathing (e.g., speaks in phrases, SOB even at rest, pulse 100-120)   Negative: Headache and stiff neck (can't touch chin to chest)   Negative: Oxygen level (e.g., pulse oximetry) 90 percent or lower   Negative: [1] Fever > 101 F (38.3 C) AND [2] over 60 years of age   Negative: [1] Fever > 100.0 F (37.8 C) AND [2] bedridden (e.g., nursing home patient, CVA, chronic illness, recovering from surgery)   Negative: MILD difficulty breathing (e.g., minimal/no SOB at rest, SOB with walking, pulse <100)   Negative: Fever > 103 F (39.4 C)    Answer Assessment - Initial Assessment Questions  1. COVID-19 DIAGNOSIS: \"Who made your COVID-19 diagnosis?\" \"Was it confirmed by a positive lab test or self-test?\" If not diagnosed by a doctor (or NP/PA), ask \"Are there lots of cases (community spread) where you live?\" Note: See public health department website, if unsure.      Home test   2. COVID-19 EXPOSURE: \"Was there any known exposure to COVID before the symptoms began?\" CDC Definition of close contact: within 6 feet (2 meters) for a total of 15 minutes or more over a 24-hour period.       Known exposure- exposed at event  3. ONSET: \"When did the COVID-19 symptoms start?\"       08/07/23  4. WORST SYMPTOM: \"What is your worst symptom?\" (e.g., cough, fever, shortness of breath, muscle aches)      Congestion   5. COUGH: \"Do you have a cough?\" If " "Yes, ask: \"How bad is the cough?\"        Cough-productive.   6. FEVER: \"Do you have a fever?\" If Yes, ask: \"What is your temperature, how was it measured, and when did it start?\"      Unknown  7. RESPIRATORY STATUS: \"Describe your breathing?\" (e.g., shortness of breath, wheezing, unable to speak)       Denies SOB/wheezing  8. BETTER-SAME-WORSE: \"Are you getting better, staying the same or getting worse compared to yesterday?\"  If getting worse, ask, \"In what way?\"      Symtpoms improving  9. HIGH RISK DISEASE: \"Do you have any chronic medical problems?\" (e.g., asthma, heart or lung disease, weak immune system, obesity, etc.)      Denies  10. VACCINE: \"Have you had the COVID-19 vaccine?\" If Yes, ask: \"Which one, how many shots, when did you get it?\"        Monovalent  11. BOOSTER: \"Have you received your COVID-19 booster?\" If Yes, ask: \"Which one and when did you get it?\"        Bivalent x1  12. PREGNANCY: \"Is there any chance you are pregnant?\" \"When was your last menstrual period?\"        NA  13. OTHER SYMPTOMS: \"Do you have any other symptoms?\"  (e.g., chills, fatigue, headache, loss of smell or taste, muscle pain, sore throat)        Diarrhea x1. Fatigue.   14. O2 SATURATION MONITOR:  \"Do you use an oxygen saturation monitor (pulse oximeter) at home?\" If Yes, ask \"What is your reading (oxygen level) today?\" \"What is your usual oxygen saturation reading?\" (e.g., 95%)        Apple watch reading- 96 % O2, HR- 68    Protocols used: Coronavirus (COVID-19) Diagnosed or [id number]    "

## 2023-08-09 NOTE — TELEPHONE ENCOUNTER
COVID Positive/Requesting COVID treatment    Patient is positive for COVID and requesting treatment options.    Date of positive COVID test (PCR or at home)? Home test on 08/08/2023  Current COVID symptoms: fever or chills, cough, fatigue, congestion or runny nose, and diarrhea  Date COVID symptoms began: 08/07/2023    Message should be routed to clinic RN pool. Best phone number to use for call back: [phone]

## 2023-08-09 NOTE — TELEPHONE ENCOUNTER
RN COVID TREATMENT VISIT  08/09/23      The patient has been triaged and does not require a higher level of care.    Omar Perez  73 year old  Current weight? 226 lbs    Has the patient been seen by a primary care provider at an [facility] or [facility] within the past two years? Yes.   Have you been in close proximity to/do you have a known exposure to a person with a confirmed case of influenza? No.     General treatment eligibility:  Date of positive COVID test (PCR or at home)?  8/8/23    Are you or have you been hospitalized for this COVID-19 infection? No.   Have you received monoclonal antibodies or antiviral treatment for COVID-19 since this positive test? No.   Do you have any of the following conditions that place you at risk of being very sick from COVID-19?   - Age 50 years or older  Yes, patient has at least one high risk condition as noted above.     Current COVID symptoms:   - cough  - congestion or runny nose  Yes. Patient has at least one symptom as selected.     How many days since symptoms started? 5 days or less. Established patient, 12 years or older weighing at least 88.2 lbs, who has symptoms that started in the past 5 days, has not been hospitalized nor received treatment already, and is at risk for being very sick from COVID-19.     Treatment eligibility by RN:  Are you currently pregnant or nursing? No  Do you have a clinically significant hypersensitivity to nirmatrelvir or ritonavir, or toxic epidermal necrolysis (TEN) or Azul-Brenden Syndrome? No  Do you have a history of hepatitis, any hepatic impairment on the Problem List (such as Child-Young Class C, cirrhosis, fatty liver disease, alcoholic liver disease), or was the last liver lab (hepatic panel, ALT, AST, ALK Phos, bilirubin) elevated in the past 6 months? No  Do you have any history of severe renal impairment (eGFR < 30mL/min)? No    Is patient eligible to continue? Yes, patient meets all  eligibility requirements for the RN COVID treatment (as denoted by all no responses above).     Current Outpatient Medications   Medication Sig Dispense Refill    potassium 99 MG TABS Take 1 tablet by mouth daily as needed      vitamin D3 (CHOLECALCIFEROL) 50 mcg (2000 units) tablet Take 1 tablet by mouth daily as needed         Medications from List 1 of the standing order (on medications that exclude the use of Paxlovid) that patient is taking: NONE. Is patient taking Bolton's Wort? No  Is patient taking Geneva's Wort or any meds from List 1? No.   Medications from List 2 of the standing order (on meds that provider needs to adjust) that patient is taking: NONE. Is patient on any of the meds from List 2? No.   Medications from List 3 of standing order (on meds that a RN needs to adjust) that patient is taking: NONE. Is patient on any meds from List 3? No.     Paxlovid has an approximate 90% reduction in hospitalization. Paxlovid can possibly cause altered sense of taste, diarrhea (loose, watery stools), high blood pressure, muscle aches.     Would patient like a Paxlovid prescription?   Yes.   Lab Results   Component Value Date    GFRESTIMATED 64 03/21/2023       Was last eGFR reduced? No, eGFR 60 or greater/ No Result on record. Patient can receive the normal renal function dose. Paxlovid Rx sent to [facility]    Temporary change to home medications: None    All medication adjustments (holds, etc) were discussed with the patient and patient was asked to repeat back (teachback) their med adjustment.  Did patient understand med adjustment? No medication adjustments needed.         Reviewed the following instructions with the patient:    Paxlovid (nimatrelvir and ritonavir)    How it works  Two medicines (nirmatrelvir and ritonavir) are taken together. They stop the virus from growing. Less amount of virus is easier for your body to fight.    How to take  Medicine comes in a  daily container with both medicine tablets. Take by mouth twice daily (once in the morning, once at night) for 5 days.  The number of tablets to take varies by patient.  Don't chew or break capsules. Swallow whole.    When to take  Take as soon as possible after positive COVID-19 test result, and within 5 days of your first symptoms.    Possible side effects  Can cause altered sense of taste, diarrhea (loose, watery stools), high blood pressure, muscle aches.    TR De La Rosa RN BSN  [facility]

## 2024-02-20 ENCOUNTER — PATIENT OUTREACH (OUTPATIENT)
Dept: CARE COORDINATION | Facility: CLINIC | Age: 74
End: 2024-02-20
Payer: COMMERCIAL

## 2024-03-05 ENCOUNTER — PATIENT OUTREACH (OUTPATIENT)
Dept: CARE COORDINATION | Facility: CLINIC | Age: 74
End: 2024-03-05
Payer: COMMERCIAL

## 2024-04-10 SDOH — HEALTH STABILITY: PHYSICAL HEALTH: ON AVERAGE, HOW MANY MINUTES DO YOU ENGAGE IN EXERCISE AT THIS LEVEL?: 50 MIN

## 2024-04-10 SDOH — HEALTH STABILITY: PHYSICAL HEALTH: ON AVERAGE, HOW MANY DAYS PER WEEK DO YOU ENGAGE IN MODERATE TO STRENUOUS EXERCISE (LIKE A BRISK WALK)?: 3 DAYS

## 2024-04-10 ASSESSMENT — SOCIAL DETERMINANTS OF HEALTH (SDOH): HOW OFTEN DO YOU GET TOGETHER WITH FRIENDS OR RELATIVES?: ONCE A WEEK

## 2024-04-11 ENCOUNTER — OFFICE VISIT (OUTPATIENT)
Dept: INTERNAL MEDICINE | Facility: CLINIC | Age: 74
End: 2024-04-11
Payer: COMMERCIAL

## 2024-04-11 VITALS
TEMPERATURE: 96.8 F | HEART RATE: 55 BPM | BODY MASS INDEX: 27.76 KG/M2 | HEIGHT: 76 IN | OXYGEN SATURATION: 99 % | RESPIRATION RATE: 16 BRPM | DIASTOLIC BLOOD PRESSURE: 89 MMHG | WEIGHT: 228 LBS | SYSTOLIC BLOOD PRESSURE: 139 MMHG

## 2024-04-11 DIAGNOSIS — R79.89 ELEVATED SERUM CREATININE: ICD-10-CM

## 2024-04-11 DIAGNOSIS — R93.1 AGATSTON CORONARY ARTERY CALCIUM SCORE LESS THAN 100: ICD-10-CM

## 2024-04-11 DIAGNOSIS — E27.9 ADRENAL NODULE (H): ICD-10-CM

## 2024-04-11 DIAGNOSIS — R97.20 ELEVATED PSA: ICD-10-CM

## 2024-04-11 DIAGNOSIS — N40.1 BPH ASSOCIATED WITH NOCTURIA: ICD-10-CM

## 2024-04-11 DIAGNOSIS — Z00.00 ENCOUNTER FOR MEDICARE ANNUAL WELLNESS EXAM: Primary | ICD-10-CM

## 2024-04-11 DIAGNOSIS — I77.810 DILATION OF THORACIC AORTA (H): ICD-10-CM

## 2024-04-11 DIAGNOSIS — R35.1 BPH ASSOCIATED WITH NOCTURIA: ICD-10-CM

## 2024-04-11 DIAGNOSIS — Z23 HIGH PRIORITY FOR 2019-NCOV VACCINE: ICD-10-CM

## 2024-04-11 DIAGNOSIS — Z29.11 NEED FOR VACCINATION AGAINST RESPIRATORY SYNCYTIAL VIRUS: ICD-10-CM

## 2024-04-11 PROBLEM — G89.29 CHRONIC RIGHT-SIDED LOW BACK PAIN WITH RIGHT-SIDED SCIATICA: Status: RESOLVED | Noted: 2020-09-10 | Resolved: 2024-04-11

## 2024-04-11 PROBLEM — M54.41 CHRONIC RIGHT-SIDED LOW BACK PAIN WITH RIGHT-SIDED SCIATICA: Status: RESOLVED | Noted: 2020-09-10 | Resolved: 2024-04-11

## 2024-04-11 LAB
ALBUMIN UR-MCNC: NEGATIVE MG/DL
APPEARANCE UR: CLEAR
BILIRUB UR QL STRIP: NEGATIVE
COLOR UR AUTO: YELLOW
ERYTHROCYTE [DISTWIDTH] IN BLOOD BY AUTOMATED COUNT: 13 % (ref 10–15)
GLUCOSE UR STRIP-MCNC: NEGATIVE MG/DL
HCT VFR BLD AUTO: 45.5 % (ref 40–53)
HGB BLD-MCNC: 15.1 G/DL (ref 13.3–17.7)
HGB UR QL STRIP: NEGATIVE
KETONES UR STRIP-MCNC: NEGATIVE MG/DL
LEUKOCYTE ESTERASE UR QL STRIP: NEGATIVE
MCH RBC QN AUTO: 30.5 PG (ref 26.5–33)
MCHC RBC AUTO-ENTMCNC: 33.2 G/DL (ref 31.5–36.5)
MCV RBC AUTO: 92 FL (ref 78–100)
NITRATE UR QL: NEGATIVE
PH UR STRIP: 5.5 [PH] (ref 5–8)
PLATELET # BLD AUTO: 221 10E3/UL (ref 150–450)
RBC # BLD AUTO: 4.95 10E6/UL (ref 4.4–5.9)
SP GR UR STRIP: 1.02 (ref 1–1.03)
UROBILINOGEN UR STRIP-ACNC: 0.2 E.U./DL
WBC # BLD AUTO: 5.4 10E3/UL (ref 4–11)

## 2024-04-11 PROCEDURE — 36415 COLL VENOUS BLD VENIPUNCTURE: CPT | Performed by: INTERNAL MEDICINE

## 2024-04-11 PROCEDURE — 85027 COMPLETE CBC AUTOMATED: CPT | Performed by: INTERNAL MEDICINE

## 2024-04-11 PROCEDURE — 90480 ADMN SARSCOV2 VAC 1/ONLY CMP: CPT | Performed by: INTERNAL MEDICINE

## 2024-04-11 PROCEDURE — 81003 URINALYSIS AUTO W/O SCOPE: CPT | Performed by: INTERNAL MEDICINE

## 2024-04-11 PROCEDURE — 80053 COMPREHEN METABOLIC PANEL: CPT | Performed by: INTERNAL MEDICINE

## 2024-04-11 PROCEDURE — G0439 PPPS, SUBSEQ VISIT: HCPCS | Performed by: INTERNAL MEDICINE

## 2024-04-11 PROCEDURE — 99214 OFFICE O/P EST MOD 30 MIN: CPT | Mod: 25 | Performed by: INTERNAL MEDICINE

## 2024-04-11 PROCEDURE — 84153 ASSAY OF PSA TOTAL: CPT | Performed by: INTERNAL MEDICINE

## 2024-04-11 PROCEDURE — 91320 SARSCV2 VAC 30MCG TRS-SUC IM: CPT | Performed by: INTERNAL MEDICINE

## 2024-04-11 RX ORDER — RESPIRATORY SYNCYTIAL VIRUS VACCINE 120MCG/0.5
0.5 KIT INTRAMUSCULAR ONCE
Qty: 1 EACH | Refills: 0 | Status: SHIPPED | OUTPATIENT
Start: 2024-04-11 | End: 2024-04-11

## 2024-04-11 ASSESSMENT — PAIN SCALES - GENERAL: PAINLEVEL: NO PAIN (0)

## 2024-04-11 NOTE — PROGRESS NOTES
Preventive Care Visit  [facility]  PERICO HERRERA MD, Internal Medicine  Apr 11, 2024      1. Encounter for Medicare annual wellness exam  Lives a very active and healthy lifestyle.  Continue same.  I will see him back yearly.  We discussed RSV vaccination today    2. BPH associated with nocturia  He does not wish to take any medications at this point.  He is emptying his bladder without difficulty.  Labs as below.  - PSA, tumor marker; Future  - UA Macroscopic with reflex to Microscopic and Culture - Lab Collect; Future  - Comprehensive metabolic panel (BMP + Alb, Alk Phos, ALT, AST, Total. Bili, TP); Future  - CBC with platelets; Future    3. Elevated PSA  As above  - PSA, tumor marker; Future    4. Dilation of thoracic aorta (H24)  Only borderline increased size of aorta which may be normal for him.  I think we can recheck again in another year or 2.    5. Adrenal nodule (H24)  Benign per imaging guidelines.    6. Need for vaccination against respiratory syncytial virus    - respiratory syncytial virus vaccine, bivalent (ABRYSVO) injection; Inject 0.5 mLs into the muscle once for 1 dose  Dispense: 1 each; Refill: 0    7. High priority for 2019-nCoV vaccine      8. Agatston coronary artery calcium score less than 100--3 in 2022  Continue healthy active lifestyle.    Curtis Nelson is a 73 year old, presenting for the following:  Wellness Visit (No concerns at time of rooming. )        4/11/2024    12:21 PM   Additional Questions   Roomed by Heather         Health Care Directive  Patient does not have a Health Care Directive or Living Will: Patient states has Advance Directive and will bring in a copy to clinic.    HPI  Omar comes in today for his annual physical.  He reports has been doing well.  Offers no concerns.  He still has BPH symptoms which are made worse with alcohol and caffeine.  Denies other somatic concerns for him.  Remains very active.  He swims 100 laps at the pool 3 days  a week.  Also an avid ski year.  Skiing in [place] in [place].  Daughter still having issues with her alcoholism.  Other daughter and grandchildren in [place] are doing quite well            4/10/2024   General Health   How would you rate your overall physical health? Excellent   Feel stress (tense, anxious, or unable to sleep) Not at all         4/10/2024   Nutrition   Diet: Regular (no restrictions)         4/10/2024   Exercise   Days per week of moderate/strenous exercise 3 days   Average minutes spent exercising at this level 50 min         4/10/2024   Social Factors   Frequency of gathering with friends or relatives Once a week   Worry food won't last until get money to buy more No   Food not last or not have enough money for food? No   Do you have housing?  Yes   Are you worried about losing your housing? No   Lack of transportation? No   Unable to get utilities (heat,electricity)? No         4/11/2024   Fall Risk   Fallen 2 or more times in the past year? No   Trouble with walking or balance? No          4/10/2024   Activities of Daily Living- Home Safety   Needs help with the following daily activites None of the above   Safety concerns in the home None of the above         4/10/2024   Dental   Dentist two times every year? Yes         4/10/2024   Hearing Screening   Hearing concerns? None of the above         4/10/2024   Driving Risk Screening   Patient/family members have concerns about driving No         4/10/2024   General Alertness/Fatigue Screening   Have you been more tired than usual lately? No         4/10/2024   Urinary Incontinence Screening   Bothered by leaking urine in past 6 months No         4/10/2024   TB Screening   Were you born outside of the [place]? No         Today's PHQ-2 Score:       4/10/2024     5:27 PM   PHQ-2 ( 1999 Pfizer)   Q1: Little interest or pleasure in doing things 0   Q2: Feeling down, depressed or hopeless 0   PHQ-2 Score 0   Q1: Little interest or pleasure in doing  things Not at all   Q2: Feeling down, depressed or hopeless Not at all   PHQ-2 Score 0           4/10/2024   Substance Use   Alcohol more than 3/day or more than 7/wk No   Do you have a current opioid prescription? No   How severe/bad is pain from 1 to 10? 0/10 (No Pain)   Do you use any other substances recreationally? No     Social History     Tobacco Use    Smoking status: Never    Smokeless tobacco: Never   Vaping Use    Vaping status: Never Used   Substance Use Topics    Alcohol use: Yes     Alcohol/week: 2.0 standard drinks of alcohol    Drug use: Never           4/10/2024   AAA Screening   Family history of Abdominal Aortic Aneurysm (AAA)? No   ASCVD Risk   The 10-year ASCVD risk score (Koby BAUGH, et al., 2019) is: 25.6%    Values used to calculate the score:      Age: 73 years      Sex: Male      Is Non- : No      Diabetic: No      Tobacco smoker: No      Systolic Blood Pressure: 139 mmHg      Is BP treated: No      HDL Cholesterol: 45 mg/dL      Total Cholesterol: 163 mg/dL            Reviewed and updated as needed this visit by Provider                      Current providers sharing in care for this patient include:  Patient Care Team:  Shorty Hernandez MD as PCP - General (Internal Medicine)  Shorty Hernandez MD as Assigned PCP  Aniyah Fermin PAUNA as Physician Assistant (Dermatology)    The following health maintenance items are reviewed in Epic and correct as of today:  Health Maintenance   Topic Date Due    RSV VACCINE (Pregnancy & 60+) (1 - 1-dose 60+ series) Never done    ANNUAL REVIEW OF HM ORDERS  03/21/2024    MEDICARE ANNUAL WELLNESS VISIT  03/21/2024    DTAP/TDAP/TD IMMUNIZATION (2 - Td or Tdap) 09/08/2024    FALL RISK ASSESSMENT  04/11/2025    COLORECTAL CANCER SCREENING  10/20/2025    GLUCOSE  03/21/2026    LIPID  01/05/2027    ADVANCE CARE PLANNING  03/21/2028    HEPATITIS C SCREENING  Completed    PHQ-2 (once per calendar year)  Completed    INFLUENZA  "VACCINE  Completed    Pneumococcal Vaccine: 65+ Years  Completed    ZOSTER IMMUNIZATION  Completed    COVID-19 Vaccine  Completed    IPV IMMUNIZATION  Aged Out    HPV IMMUNIZATION  Aged Out    MENINGITIS IMMUNIZATION  Aged Out    RSV MONOCLONAL ANTIBODY  Aged Out         Review of Systems  Constitutional, HEENT, cardiovascular, pulmonary, gi and gu systems are negative, except as otherwise noted.     Objective    Exam  /89 (BP Location: Left arm, Patient Position: Sitting, Cuff Size: Adult Large)   Pulse 55   Temp 96.8  F (36  C) (Tympanic)   Resp 16   Ht 1.93 m (6' 3.98\")   Wt 103.4 kg (228 lb)   SpO2 99%   BMI 27.76 kg/m     Estimated body mass index is 27.76 kg/m  as calculated from the following:    Height as of this encounter: 1.93 m (6' 3.98\").    Weight as of this encounter: 103.4 kg (228 lb).    Physical Exam  GENERAL: alert and no distress  EYES: Eyes grossly normal to inspection, PERRL and conjunctivae and sclerae normal  HENT: ear canals and TM's normal, nose and mouth without ulcers or lesions  NECK: no adenopathy, no asymmetry, masses, or scars  RESP: lungs clear to auscultation - no rales, rhonchi or wheezes  CV: regular rate and rhythm, normal S1 S2, no S3 or S4, no murmur, click or rub, no peripheral edema  ABDOMEN: soft, nontender, no hepatosplenomegaly, no masses and bowel sounds normal  MS: no gross musculoskeletal defects noted, no edema  NEURO: Normal strength and tone, mentation intact and speech normal  PSYCH: mentation appears normal, affect normal/bright         4/11/2024   Mini Cog   Clock Draw Score 2 Normal   3 Item Recall 3 objects recalled   Mini Cog Total Score 5              Signed Electronically by: PERICO HERRERA MD    "

## 2024-04-11 NOTE — PATIENT INSTRUCTIONS
Preventive Care Advice   This is general advice given by our system to help you stay healthy. However, your care team may have specific advice just for you. Please talk to your care team about your preventive care needs.  Nutrition  Eat 5 or more servings of fruits and vegetables each day.  Try wheat bread, brown rice and whole grain pasta (instead of white bread, rice, and pasta).  Get enough calcium and vitamin D. Check the label on foods and aim for 100% of the RDA (recommended daily allowance).  Lifestyle  Exercise at least 150 minutes each week   (30 minutes a day, 5 days a week).  Do muscle strengthening activities 2 days a week. These help control your weight and prevent disease.  No smoking.  Wear sunscreen to prevent skin cancer.  Have a dental exam and cleaning every 6 months.  Yearly exams  See your health care team every year to talk about:  Any changes in your health.  Any medicines your care team has prescribed.  Preventive care, family planning, and ways to prevent chronic diseases.  Shots (vaccines)   HPV shots (up to age 26), if you've never had them before.  Hepatitis B shots (up to age 59), if you've never had them before.  COVID-19 shot: Get this shot when it's due.  Flu shot: Get a flu shot every year.  Tetanus shot: Get a tetanus shot every 10 years.  Pneumococcal, hepatitis A, and RSV shots: Ask your care team if you need these based on your risk.  Shingles shot (for age 50 and up).  General health tests  Diabetes screening:  Starting at age 35, Get screened for diabetes at least every 3 years.  If you are younger than age 35, ask your care team if you should be screened for diabetes.  Cholesterol test: At age 39, start having a cholesterol test every 5 years, or more often if advised.  Bone density scan (DEXA): At age 50, ask your care team if you should have this scan for osteoporosis (brittle bones).  Hepatitis C: Get tested at least once in your life.  STIs (sexually transmitted  infections)  Before age 24: Ask your care team if you should be screened for STIs.  After age 24: Get screened for STIs if you're at risk. You are at risk for STIs (including HIV) if:  You are sexually active with more than one person.  You don't use condoms every time.  You or a partner was diagnosed with a sexually transmitted infection.  If you are at risk for HIV, ask about PrEP medicine to prevent HIV.  Get tested for HIV at least once in your life, whether you are at risk for HIV or not.  Cancer screening tests  Cervical cancer screening: If you have a cervix, begin getting regular cervical cancer screening tests at age 21. Most people who have regular screenings with normal results can stop after age 65. Talk about this with your provider.  Breast cancer scan (mammogram): If you've ever had breasts, begin having regular mammograms starting at age 40. This is a scan to check for breast cancer.  Colon cancer screening: It is important to start screening for colon cancer at age 45.  Have a colonoscopy test every 10 years (or more often if you're at risk) Or, ask your provider about stool tests like a FIT test every year or Cologuard test every 3 years.  To learn more about your testing options, visit: https://[email or web address]/338217.pdf.  For help making a decision, visit: https://bit.ly/ct21428.  Prostate cancer screening test: If you have a prostate and are age 55 to 69, ask your provider if you would benefit from a yearly prostate cancer screening test.  Lung cancer screening: If you are a current or former smoker age 50 to 80, ask your care team if ongoing lung cancer screenings are right for you.  For informational purposes only. Not to replace the advice of your health care provider. Copyright   2023 [employer]. All rights reserved. Clinically reviewed by the [facility] Transitions Program. [employer] [id number] - REV 01/24.

## 2024-04-12 LAB
ALBUMIN SERPL BCG-MCNC: 4.4 G/DL (ref 3.5–5.2)
ALP SERPL-CCNC: 76 U/L (ref 40–150)
ALT SERPL W P-5'-P-CCNC: 15 U/L (ref 0–70)
ANION GAP SERPL CALCULATED.3IONS-SCNC: 11 MMOL/L (ref 7–15)
AST SERPL W P-5'-P-CCNC: 22 U/L (ref 0–45)
BILIRUB SERPL-MCNC: 0.5 MG/DL
BUN SERPL-MCNC: 19.8 MG/DL (ref 8–23)
CALCIUM SERPL-MCNC: 9.8 MG/DL (ref 8.8–10.2)
CHLORIDE SERPL-SCNC: 102 MMOL/L (ref 98–107)
CREAT SERPL-MCNC: 1.21 MG/DL (ref 0.67–1.17)
DEPRECATED HCO3 PLAS-SCNC: 25 MMOL/L (ref 22–29)
EGFRCR SERPLBLD CKD-EPI 2021: 63 ML/MIN/1.73M2
GLUCOSE SERPL-MCNC: 89 MG/DL (ref 70–99)
POTASSIUM SERPL-SCNC: 4.5 MMOL/L (ref 3.4–5.3)
PROT SERPL-MCNC: 7.7 G/DL (ref 6.4–8.3)
PSA SERPL DL<=0.01 NG/ML-MCNC: 6.34 NG/ML (ref 0–6.5)
SODIUM SERPL-SCNC: 138 MMOL/L (ref 135–145)

## 2024-04-15 ENCOUNTER — TELEPHONE (OUTPATIENT)
Dept: INTERNAL MEDICINE | Facility: CLINIC | Age: 74
End: 2024-04-15
Payer: COMMERCIAL

## 2024-04-15 NOTE — TELEPHONE ENCOUNTER
Pt returned lab appt vm - advised Rns were gone for the day and let pt know comments are on MyChart - pt had no further questions and stated he will look at his MyChart  
You can access the FollowMyHealth Patient Portal offered by [facility] by registering at the following website: [email or web address]. By joining [employer]’s FollowMyHealth portal, you will also be able to view your health information using other applications (apps) compatible with our system.

## 2024-04-24 ENCOUNTER — APPOINTMENT (OUTPATIENT)
Dept: CT IMAGING | Facility: CLINIC | Age: 74
DRG: 309 | End: 2024-04-24
Attending: EMERGENCY MEDICINE
Payer: COMMERCIAL

## 2024-04-24 ENCOUNTER — HOSPITAL ENCOUNTER (EMERGENCY)
Facility: CLINIC | Age: 74
Discharge: HOME | DRG: 309 | End: 2024-04-24
Attending: EMERGENCY MEDICINE | Admitting: EMERGENCY MEDICINE
Payer: COMMERCIAL

## 2024-04-24 ENCOUNTER — OFFICE VISIT (OUTPATIENT)
Dept: INTERNAL MEDICINE | Facility: CLINIC | Age: 74
End: 2024-04-24
Payer: COMMERCIAL

## 2024-04-24 VITALS
HEIGHT: 72 IN | WEIGHT: 224 LBS | SYSTOLIC BLOOD PRESSURE: 125 MMHG | TEMPERATURE: 97.4 F | RESPIRATION RATE: 18 BRPM | BODY MASS INDEX: 30.34 KG/M2 | OXYGEN SATURATION: 97 % | DIASTOLIC BLOOD PRESSURE: 63 MMHG | HEART RATE: 83 BPM

## 2024-04-24 VITALS
OXYGEN SATURATION: 96 % | HEART RATE: 94 BPM | WEIGHT: 226 LBS | BODY MASS INDEX: 28.1 KG/M2 | RESPIRATION RATE: 16 BRPM | TEMPERATURE: 102.9 F | SYSTOLIC BLOOD PRESSURE: 134 MMHG | DIASTOLIC BLOOD PRESSURE: 74 MMHG | HEIGHT: 75 IN

## 2024-04-24 DIAGNOSIS — R53.1 GENERALIZED WEAKNESS: ICD-10-CM

## 2024-04-24 DIAGNOSIS — R63.0 ANOREXIA: ICD-10-CM

## 2024-04-24 DIAGNOSIS — R50.9 ACUTE FEBRILE ILLNESS: Primary | ICD-10-CM

## 2024-04-24 DIAGNOSIS — N28.9 RENAL INSUFFICIENCY: ICD-10-CM

## 2024-04-24 DIAGNOSIS — M62.81 MUSCLE WEAKNESS (GENERALIZED): ICD-10-CM

## 2024-04-24 DIAGNOSIS — R06.82 TACHYPNEA: ICD-10-CM

## 2024-04-24 DIAGNOSIS — R50.9 FEBRILE ILLNESS, ACUTE: ICD-10-CM

## 2024-04-24 DIAGNOSIS — R63.0 LOSS OF APPETITE: ICD-10-CM

## 2024-04-24 DIAGNOSIS — R09.89 ABNORMAL LUNG SOUNDS: ICD-10-CM

## 2024-04-24 LAB
ALBUMIN SERPL BCG-MCNC: 4 G/DL (ref 3.5–5.2)
ALBUMIN UR-MCNC: 70 MG/DL
ALP SERPL-CCNC: 162 U/L (ref 40–150)
ALT SERPL W P-5'-P-CCNC: 36 U/L (ref 0–70)
ANION GAP SERPL CALCULATED.3IONS-SCNC: 14 MMOL/L (ref 7–15)
APPEARANCE UR: CLEAR
AST SERPL W P-5'-P-CCNC: 35 U/L (ref 0–45)
ATRIAL RATE - MUSE: 76 BPM
BASOPHILS # BLD AUTO: 0 10E3/UL (ref 0–0.2)
BASOPHILS NFR BLD AUTO: 0 %
BILIRUB DIRECT SERPL-MCNC: 0.32 MG/DL (ref 0–0.3)
BILIRUB SERPL-MCNC: 0.7 MG/DL
BILIRUB UR QL STRIP: NEGATIVE
BUN SERPL-MCNC: 18.3 MG/DL (ref 8–23)
CALCIUM SERPL-MCNC: 9.2 MG/DL (ref 8.8–10.2)
CHLORIDE SERPL-SCNC: 95 MMOL/L (ref 98–107)
CK SERPL-CCNC: 78 U/L (ref 39–308)
COLOR UR AUTO: YELLOW
CREAT SERPL-MCNC: 1.5 MG/DL (ref 0.67–1.17)
DEPRECATED HCO3 PLAS-SCNC: 21 MMOL/L (ref 22–29)
DIASTOLIC BLOOD PRESSURE - MUSE: 69 MMHG
EGFRCR SERPLBLD CKD-EPI 2021: 49 ML/MIN/1.73M2
EOSINOPHIL # BLD AUTO: 0.1 10E3/UL (ref 0–0.7)
EOSINOPHIL NFR BLD AUTO: 1 %
ERYTHROCYTE [DISTWIDTH] IN BLOOD BY AUTOMATED COUNT: 13 % (ref 10–15)
FLUAV RNA SPEC QL NAA+PROBE: NEGATIVE
FLUBV RNA RESP QL NAA+PROBE: NEGATIVE
GLUCOSE SERPL-MCNC: 122 MG/DL (ref 70–99)
GLUCOSE UR STRIP-MCNC: NEGATIVE MG/DL
GROUP A STREP BY PCR: NOT DETECTED
HCT VFR BLD AUTO: 42.3 % (ref 40–53)
HGB BLD-MCNC: 14.7 G/DL (ref 13.3–17.7)
HGB UR QL STRIP: ABNORMAL
HYALINE CASTS: 2 /LPF
IMM GRANULOCYTES # BLD: 0 10E3/UL
IMM GRANULOCYTES NFR BLD: 0 %
INTERPRETATION ECG - MUSE: NORMAL
KETONES UR STRIP-MCNC: NEGATIVE MG/DL
LACTATE SERPL-SCNC: 1.4 MMOL/L (ref 0.7–2)
LEUKOCYTE ESTERASE UR QL STRIP: NEGATIVE
LIPASE SERPL-CCNC: 29 U/L (ref 13–60)
LYMPHOCYTES # BLD AUTO: 0.3 10E3/UL (ref 0.8–5.3)
LYMPHOCYTES NFR BLD AUTO: 3 %
MCH RBC QN AUTO: 30.8 PG (ref 26.5–33)
MCHC RBC AUTO-ENTMCNC: 34.8 G/DL (ref 31.5–36.5)
MCV RBC AUTO: 89 FL (ref 78–100)
MONOCYTES # BLD AUTO: 1.1 10E3/UL (ref 0–1.3)
MONOCYTES NFR BLD AUTO: 11 %
MUCOUS THREADS #/AREA URNS LPF: PRESENT /LPF
NEUTROPHILS # BLD AUTO: 8.2 10E3/UL (ref 1.6–8.3)
NEUTROPHILS NFR BLD AUTO: 85 %
NITRATE UR QL: NEGATIVE
NRBC # BLD AUTO: 0 10E3/UL
NRBC BLD AUTO-RTO: 0 /100
P AXIS - MUSE: 50 DEGREES
PH UR STRIP: 5.5 [PH] (ref 5–7)
PLATELET # BLD AUTO: 207 10E3/UL (ref 150–450)
POTASSIUM SERPL-SCNC: 4.3 MMOL/L (ref 3.4–5.3)
PR INTERVAL - MUSE: 148 MS
PROT SERPL-MCNC: 7.9 G/DL (ref 6.4–8.3)
QRS DURATION - MUSE: 102 MS
QT - MUSE: 390 MS
QTC - MUSE: 438 MS
R AXIS - MUSE: -38 DEGREES
RBC # BLD AUTO: 4.78 10E6/UL (ref 4.4–5.9)
RBC URINE: 3 /HPF
RSV RNA SPEC NAA+PROBE: NEGATIVE
SARS-COV-2 RNA RESP QL NAA+PROBE: NEGATIVE
SODIUM SERPL-SCNC: 130 MMOL/L (ref 135–145)
SP GR UR STRIP: 1.02 (ref 1–1.03)
SQUAMOUS EPITHELIAL: 1 /HPF
SYSTOLIC BLOOD PRESSURE - MUSE: 130 MMHG
T AXIS - MUSE: 37 DEGREES
UROBILINOGEN UR STRIP-MCNC: <2 MG/DL
VENTRICULAR RATE- MUSE: 76 BPM
WBC # BLD AUTO: 9.7 10E3/UL (ref 4–11)
WBC URINE: 3 /HPF

## 2024-04-24 PROCEDURE — 36415 COLL VENOUS BLD VENIPUNCTURE: CPT | Performed by: EMERGENCY MEDICINE

## 2024-04-24 PROCEDURE — 87040 BLOOD CULTURE FOR BACTERIA: CPT | Performed by: EMERGENCY MEDICINE

## 2024-04-24 PROCEDURE — 87637 SARSCOV2&INF A&B&RSV AMP PRB: CPT | Performed by: EMERGENCY MEDICINE

## 2024-04-24 PROCEDURE — 83605 ASSAY OF LACTIC ACID: CPT | Performed by: EMERGENCY MEDICINE

## 2024-04-24 PROCEDURE — 250N000011 HC RX IP 250 OP 636: Performed by: EMERGENCY MEDICINE

## 2024-04-24 PROCEDURE — 99213 OFFICE O/P EST LOW 20 MIN: CPT | Performed by: INTERNAL MEDICINE

## 2024-04-24 PROCEDURE — 258N000003 HC RX IP 258 OP 636: Performed by: EMERGENCY MEDICINE

## 2024-04-24 PROCEDURE — 250N000013 HC RX MED GY IP 250 OP 250 PS 637: Performed by: EMERGENCY MEDICINE

## 2024-04-24 PROCEDURE — 82248 BILIRUBIN DIRECT: CPT | Performed by: EMERGENCY MEDICINE

## 2024-04-24 PROCEDURE — 93005 ELECTROCARDIOGRAM TRACING: CPT | Performed by: EMERGENCY MEDICINE

## 2024-04-24 PROCEDURE — 82374 ASSAY BLOOD CARBON DIOXIDE: CPT | Performed by: EMERGENCY MEDICINE

## 2024-04-24 PROCEDURE — 96361 HYDRATE IV INFUSION ADD-ON: CPT

## 2024-04-24 PROCEDURE — 99285 EMERGENCY DEPT VISIT HI MDM: CPT | Mod: 25

## 2024-04-24 PROCEDURE — 82550 ASSAY OF CK (CPK): CPT | Performed by: EMERGENCY MEDICINE

## 2024-04-24 PROCEDURE — 71275 CT ANGIOGRAPHY CHEST: CPT

## 2024-04-24 PROCEDURE — 74177 CT ABD & PELVIS W/CONTRAST: CPT

## 2024-04-24 PROCEDURE — 81001 URINALYSIS AUTO W/SCOPE: CPT | Performed by: EMERGENCY MEDICINE

## 2024-04-24 PROCEDURE — 87651 STREP A DNA AMP PROBE: CPT | Performed by: EMERGENCY MEDICINE

## 2024-04-24 PROCEDURE — 96360 HYDRATION IV INFUSION INIT: CPT | Mod: 59

## 2024-04-24 PROCEDURE — 83690 ASSAY OF LIPASE: CPT | Performed by: EMERGENCY MEDICINE

## 2024-04-24 PROCEDURE — 85025 COMPLETE CBC W/AUTO DIFF WBC: CPT | Performed by: EMERGENCY MEDICINE

## 2024-04-24 RX ORDER — IOPAMIDOL 755 MG/ML
100 INJECTION, SOLUTION INTRAVASCULAR ONCE
Status: COMPLETED | OUTPATIENT
Start: 2024-04-24 | End: 2024-04-24

## 2024-04-24 RX ORDER — ACETAMINOPHEN 325 MG/1
650 TABLET ORAL ONCE
Status: COMPLETED | OUTPATIENT
Start: 2024-04-24 | End: 2024-04-24

## 2024-04-24 RX ADMIN — IOPAMIDOL 75 ML: 755 INJECTION, SOLUTION INTRAVENOUS at 14:24

## 2024-04-24 RX ADMIN — ACETAMINOPHEN 650 MG: 325 TABLET ORAL at 13:36

## 2024-04-24 RX ADMIN — SODIUM CHLORIDE 1000 ML: 9 INJECTION, SOLUTION INTRAVENOUS at 13:36

## 2024-04-24 ASSESSMENT — ENCOUNTER SYMPTOMS
CONSTIPATION: 1
MYALGIAS: 0
FEVER: 1
NECK PAIN: 0
ABDOMINAL PAIN: 1
COUGH: 0
CHILLS: 1
NAUSEA: 0
LIGHT-HEADEDNESS: 0
APPETITE CHANGE: 1
DIARRHEA: 0
SHORTNESS OF BREATH: 0
VOMITING: 0
FLANK PAIN: 0
FEVER: 1
FATIGUE: 1
RECTAL PAIN: 0
DYSURIA: 0

## 2024-04-24 ASSESSMENT — COLUMBIA-SUICIDE SEVERITY RATING SCALE - C-SSRS
6. HAVE YOU EVER DONE ANYTHING, STARTED TO DO ANYTHING, OR PREPARED TO DO ANYTHING TO END YOUR LIFE?: NO
2. HAVE YOU ACTUALLY HAD ANY THOUGHTS OF KILLING YOURSELF IN THE PAST MONTH?: NO
1. IN THE PAST MONTH, HAVE YOU WISHED YOU WERE DEAD OR WISHED YOU COULD GO TO SLEEP AND NOT WAKE UP?: NO

## 2024-04-24 ASSESSMENT — ACTIVITIES OF DAILY LIVING (ADL)
ADLS_ACUITY_SCORE: 35
ADLS_ACUITY_SCORE: 35

## 2024-04-24 NOTE — ED TRIAGE NOTES
Pt presents to the ED for evaluation for fevers for 6 days. Pt has been feeling generally weak but denies other symptoms. Endorses loss of appetite but denies nausea. Denies any pain, shortness of breath, respiratory symptoms, or urinary symptoms. Pt ambulating independently without any difficulty.      Triage Assessment (Adult)       Row Name 04/24/24 1247          Triage Assessment    Airway WDL WDL        Respiratory WDL    Respiratory WDL WDL        Skin Circulation/Temperature WDL    Skin Circulation/Temperature WDL WDL        Cardiac WDL    Cardiac WDL WDL        Peripheral/Neurovascular WDL    Peripheral Neurovascular WDL WDL        Cognitive/Neuro/Behavioral WDL    Cognitive/Neuro/Behavioral WDL WDL

## 2024-04-24 NOTE — ED PROVIDER NOTES
"EMERGENCY DEPARTMENT ENCOUNTER      NAME: Omar Perez  AGE: 73 year old male  YOB: 1950  MRN: [id number]  EVALUATION DATE & TIME: No admission date for patient encounter.    PCP: Shorty Hernandez    ED PROVIDER: Fariba Arango M.D.      CHIEF COMPLAINT     Chief Complaint   Patient presents with    Fever    Fatigue         FINAL IMPRESSION:   No diagnosis found.      MEDICAL DECISION MAKING:       Pertinent Labs & Imaging studies reviewed. (See chart for details)    73 year old male presents to the Emergency Department for evaluation of fever    History  Supplemental history from wife and PCP  External Record(s) review as documented below    Exam  Nontoxic but looks like he does not feel well.  Generalized weakness barely able to walk.  Appears short of breath but denies this.  Occasional rhonchi.  Neck is supple.  Febrile at 102.  Diffuseness but patient with no localized guarding or rebound.    Differential Diagnosis include but not limited to COVID, influenza, PE, pneumonia, diverticulitis, prostatitis, endocarditis, among others      Vital Signs: Febrile  EKG:  Imaging:pending   Home meds: Reviewed  ED meds: Tylenol  Fluids: 1 L normal saline  Labs:  K 4.3  Cr 1.50  Wbc 9.7  Hgb 14.7  platelets 207    Clinical Impression and Decision Making    73-year-old male presents from his primary care doctor's office for further evaluation so sepsis \"likely admission\" secondary to generalized weakness and shortness of breath and intermittent fevers.  Relatively healthy.  Travelled to [place] but did not do any camping.  Developed fevers and chills.  For 3 days.  Tmax 102 takes Tylenol gets better and then it returns.  Anorexia states eating her she is still mild complaint suprapubically but no dysuria.  Has not had a bowel movement in a week.    Denies any leg swelling no rashes no tick bites no camping no sick contacts    Exam ill-appearing neck is supple appears generalized weak.  Rhonchi.  Suprapubic " tenderness palpation.  Extensive differential diagnosis considered.    Labs normal lactic acid normal white blood cell count normal renal function slightly elevated bilirubin.  Urine without signs of infection.    Etiologies considered prostatitis patient has no anal pain urine is negative for infection.  Also endocarditis no murmurs.  No risk factors otherwise.    Chart updated.  Currently receiving fluids.  Pending CT chest to evaluate for infectious pathology and PE given the recent travel and CT abdomen pelvis to evaluate for any evidence of sepsis given his inability to eat and fever.      Patient signed out to Dr. Mercado pending CT chest and abdomen and reevaluation.        In addition to the work out documented, I considered the following work up lumbar puncture.  Patient is generalized weak but has no meningitis.  Neck is supple          Medical Decision Making    History:  Supplemental history from: Family Member/Significant Other  External Record(s) reviewed: Documented in chart    Work Up:  Chart documentation includes differential considered and any EKGs or imaging independently interpreted by provider, where specified.  In additional to work up documented, I considered the following work up: Documented in chart, if applicable.    External consultation:  Discussion of management with another provider: Hospitalist    Complicating factors:  Care impacted by chronic illness: N/A  Care affected by social determinants of health: Access to Medical Care    Disposition considerations: Signout to Dr. Proctor pending reevaluation CT abdomen and pelvis      Review of External Records  Hospital/Clinic: [facility] - Evaluated for lack of appetite, fever, and generalized weakness  Date: 4/24/2024      External Consultation  Hospitalist -       ED COURSE   1:07 PM I met with the patient to obtain patient history and performed a physical exam. Discussed plan for ED work up including potential diagnostic studies  and interventions.  2:06 PM reevaluated and updated  2:31 PM signed out to Dr. Proctor pending CT chest abdomen and reevaluation.    At the conclusion of the encounter I discussed the results of all of the tests and the disposition. The questions were answered. The patient and wife acknowledged understanding and was agreeable with the care plan.         MEDICATIONS GIVEN IN THE EMERGENCY:     Medications   acetaminophen (TYLENOL) tablet 650 mg (650 mg Oral $Given 4/24/24 1336)   sodium chloride 0.9% BOLUS 1,000 mL (1,000 mLs Intravenous $New Bag 4/24/24 1336)   iopamidol (ISOVUE-370) solution 100 mL (75 mLs Intravenous $Given 4/24/24 1424)       NEW PRESCRIPTIONS STARTED AT TODAY'S ER VISIT     New Prescriptions    No medications on file          =================================================================    HPI     Patient information was obtained from: patient and wife    Use of : N/A         Omar Perez is a 73 year old male with pertinent medical history of dilation of thoracic aorta, adrenal nodule, hernia repair surgery x3, who presents by private vehicle for fever and generalized weakness.    Per Wife, they recently fly to [place] to visit their daughter on 4/18/2024 and returned back to [place] on 4/22/2024. She says on Friday, 4/19/2024, patient was outside repairing his daughter's door but it was cold and snowing then. Since then, he's had persistent chills and fever. Wife also notes she had 1 day of sore throat a couple of weeks ago but this resolved spontaneously.     Per patient, he has been feeling unwell since Friday (4/19/) with fever, chills, fatigue, generalized weakness, decrease in appetite, and slight lower abdominal pain that is worse with eating. He has been using ibuprofen and tylenol for the fever which brings him slight relief but fever comes back after a couple hours. He notes that his fever is 102.9F today. He did see his PCP today who advised him to go to the ED for  admission.    He also endorses constipation for the last week (last BM was 4/12/2024) which is abnormal to his baseline. He otherwise denies associating diarrhea, dysuria, cough, ear pain, chest pain, shortness of breath, lower extremity edema, rash, light headedness, flank pain, anal pain, calf pain, or neck pain. He denies recent camping. He denies history of heart surgery, HTN, HLD, or DM2. He does have history of hernia repair surgery x3 but denies appendectomy and cholecystectomy. There were no other concerns/complaints at this time.      REVIEW OF SYSTEMS   Review of Systems   Constitutional:  Positive for appetite change (decrease), chills, fatigue and fever (102.9F).        Endorses generalized weakness.   HENT:  Negative for ear pain.    Respiratory:  Negative for cough and shortness of breath.    Cardiovascular:  Negative for chest pain.   Gastrointestinal:  Positive for abdominal pain (slight lower) and constipation (1 week). Negative for diarrhea, nausea, rectal pain and vomiting.   Genitourinary:  Negative for dysuria and flank pain.   Musculoskeletal:  Negative for myalgias and neck pain.   Skin:  Negative for rash.   Neurological:  Negative for light-headedness.   All other systems reviewed and are negative.       PAST MEDICAL HISTORY:   History reviewed. No pertinent past medical history.    PAST SURGICAL HISTORY:     Past Surgical History:   Procedure Laterality Date    HERNIA REPAIR           CURRENT MEDICATIONS:   vitamin D3 (CHOLECALCIFEROL) 50 mcg (2000 units) tablet         ALLERGIES:   No Known Allergies    FAMILY HISTORY:     Family History   Problem Relation Age of Onset    Prostate Cancer Father     No Known Problems Daughter     Alcoholism Daughter        SOCIAL HISTORY:     Social History     Socioeconomic History    Marital status:    Tobacco Use    Smoking status: Never    Smokeless tobacco: Never   Vaping Use    Vaping status: Never Used   Substance and Sexual Activity     Alcohol use: Yes     Alcohol/week: 2.0 standard drinks of alcohol    Drug use: Never     Social Determinants of Health     Financial Resource Strain: Low Risk  (4/10/2024)    Financial Resource Strain     Within the past 12 months, have you or your family members you live with been unable to get utilities (heat, electricity) when it was really needed?: No   Food Insecurity: Low Risk  (4/10/2024)    Food Insecurity     Within the past 12 months, did you worry that your food would run out before you got money to buy more?: No     Within the past 12 months, did the food you bought just not last and you didn t have money to get more?: No   Transportation Needs: Low Risk  (4/10/2024)    Transportation Needs     Within the past 12 months, has lack of transportation kept you from medical appointments, getting your medicines, non-medical meetings or appointments, work, or from getting things that you need?: No   Physical Activity: Sufficiently Active (4/10/2024)    Exercise Vital Sign     Days of Exercise per Week: 3 days     Minutes of Exercise per Session: 50 min   Stress: No Stress Concern Present (4/10/2024)    [employer] - Occupational Stress Questionnaire     Feeling of Stress : Not at all   Social Connections: Unknown (4/10/2024)    Social Connection and Isolation Panel [NHANES]     Frequency of Social Gatherings with Friends and Family: Once a week   Interpersonal Safety: Low Risk  (4/11/2024)    Interpersonal Safety     Do you feel physically and emotionally safe where you currently live?: Yes     Within the past 12 months, have you been hit, slapped, kicked or otherwise physically hurt by someone?: No     Within the past 12 months, have you been humiliated or emotionally abused in other ways by your partner or ex-partner?: No   Housing Stability: Low Risk  (4/10/2024)    Housing Stability     Do you have housing? : Yes     Are you worried about losing your housing?: No       VITALS:    /63   Pulse 95   Temp (!) 102.5  F (39.2  C) (Oral)   Resp 20   Ht 1.829 m (6')   Wt 101.6 kg (224 lb)   SpO2 95%   BMI 30.38 kg/m      PHYSICAL EXAM     Physical Exam  Vitals and nursing note reviewed. Exam conducted with a chaperone present.   Constitutional:       Appearance: He is ill-appearing. He is not diaphoretic.   Skin:     General: Skin is warm.      Capillary Refill: Capillary refill takes less than 2 seconds.      Coloration: Skin is pale.   Neurological:      Mental Status: He is alert and oriented to person, place, and time.         Physical Exam   Constitutional: Ill appearing. Looks like he doesn't feel well.    Head: Atraumatic.     Nose: Nose normal.     Mouth/Throat: Oropharynx is clear slightly dry mucous membranes.  Uvula is midline.  No exudate.    Eyes: EOM are normal. Pupils are equal, round, and reactive to light.     Ears: Bilateral pearly white tympanic membranes.    Neck: Normal range of motion. Neck supple.     Cardiovascular: Tachycardiac, regular rhythm and normal heart sounds.  2+ femoral pulses/radial/DP pulses B    Pulmonary/Chest: Tachypneic.    Abdominal: soft nontender.    Musculoskeletal: Normal range of motion.     Neurological: Moves upper and lower extremities equally.    Lymphatics: no edema, no calves pain, no palpable cords.    : NA    Skin: Skin is warm and dry.  No petechia no purpura    Psychiatric: Normal mood and affect. Behavior is normal.       LAB:     All pertinent labs reviewed and interpreted.  Labs Ordered and Resulted from Time of ED Arrival to Time of ED Departure   BASIC METABOLIC PANEL - Abnormal       Result Value    Sodium 130 (*)     Potassium 4.3      Chloride 95 (*)     Carbon Dioxide (CO2) 21 (*)     Anion Gap 14      Urea Nitrogen 18.3      Creatinine 1.50 (*)     GFR Estimate 49 (*)     Calcium 9.2      Glucose 122 (*)    HEPATIC FUNCTION PANEL - Abnormal    Protein Total 7.9      Albumin 4.0      Bilirubin Total 0.7      Alkaline  Phosphatase 162 (*)     AST 35      ALT 36      Bilirubin Direct 0.32 (*)    ROUTINE UA WITH MICROSCOPIC REFLEX TO CULTURE - Abnormal    Color Urine Yellow      Appearance Urine Clear      Glucose Urine Negative      Bilirubin Urine Negative      Ketones Urine Negative      Specific Gravity Urine 1.020      Blood Urine 0.06 mg/dL (*)     pH Urine 5.5      Protein Albumin Urine 70 (*)     Urobilinogen Urine <2.0      Nitrite Urine Negative      Leukocyte Esterase Urine Negative      Mucus Urine Present (*)     RBC Urine 3 (*)     WBC Urine 3      Squamous Epithelials Urine 1      Hyaline Casts Urine 2     CBC WITH PLATELETS AND DIFFERENTIAL - Abnormal    WBC Count 9.7      RBC Count 4.78      Hemoglobin 14.7      Hematocrit 42.3      MCV 89      MCH 30.8      MCHC 34.8      RDW 13.0      Platelet Count 207      % Neutrophils 85      % Lymphocytes 3      % Monocytes 11      % Eosinophils 1      % Basophils 0      % Immature Granulocytes 0      NRBCs per 100 WBC 0      Absolute Neutrophils 8.2      Absolute Lymphocytes 0.3 (*)     Absolute Monocytes 1.1      Absolute Eosinophils 0.1      Absolute Basophils 0.0      Absolute Immature Granulocytes 0.0      Absolute NRBCs 0.0     LIPASE - Normal    Lipase 29     INFLUENZA A/B, RSV, & SARS-COV2 PCR - Normal    Influenza A PCR Negative      Influenza B PCR Negative      RSV PCR Negative      SARS CoV2 PCR Negative     LACTIC ACID WHOLE BLOOD - Normal    Lactic Acid 1.4     BLOOD CULTURE   BLOOD CULTURE   GROUP A STREPTOCOCCUS PCR THROAT SWAB        RADIOLOGY:     Reviewed all pertinent imaging. Please see official radiology report.  CT Abdomen Pelvis w Contrast   Final Result   IMPRESSION:    1.  No acute findings in the abdomen and pelvis to explain the patient's pain.      2.  Moderate prostatic hypertrophy.      3.  Mild scattered diverticula in the colon.      CT Chest Pulmonary Embolism w Contrast   Final Result   IMPRESSION:   1.  Negative for pulmonary emboli and  negative for dissections.      2.  No significant findings in the lungs.      3.  Mild generalized cardiac enlargement.      4.  Stable 4.1 cm mild dilatation ascending thoracic aorta.           EKG:       I have independently reviewed and interpreted the EKG(s) documented above.      PROCEDURES:     Procedures      I, Bhavani Rodríguez, am serving as a scribe to document services personally performed by Dr. Arango based on my observation and the provider's statements to me. I, Fariba Arango MD attest that Bhavani Rodríguez is acting in a scribe capacity, has observed my performance of the services and has documented them in accordance with my direction.    Fariba Arango M.D.  Emergency Medicine  [facility] EMERGENCY ROOM  [address] [facility] [address] [phone]  Dept: [phone]       Fariba Arango MD  04/24/24 6933

## 2024-04-24 NOTE — ED NOTES
Expected Patient Referral to ED  12:24 PM    Referring Clinic/Provider:  Dr. Hernandez   Internal Medicine Clinic    Reason for referral/Clinical facts:  Fevers 103 for 3 days  No focal infectious symptoms  Sepsis concern    Recommendations provided:  Send to ED for further evaluation    Caller was informed that this institution does possess the capabilities and/or resources to provide for patient and should be transferred to our facility.    Discussed that if direct admit is sought and any hurdles are encountered, this ED would be happy to see the patient and evaluate.    Informed caller that recommendations provided are recommendations based only on the facts provided and that they responsible to accept or reject the advice, or to seek a formal in person consultation as needed and that this ED will see/treat patient should they arrive.      Valente Clements MD  [facility] EMERGENCY ROOM  [address] [place] [id number] [phone]       Valente Clements MD  04/24/24 1493

## 2024-04-24 NOTE — PROGRESS NOTES
1. Acute febrile illness  I am concerned about patient.  He really needs to be evaluated in an emergency room type of situation.  I suspect he is going to need admission to the hospital.  sending him by private car with his wife driving to [place].  He will need further evaluation and management.  Again likely admission to hospital given his significant weakness.  Discussed case with Dr. Cottrell    2. Loss of appetite  As above    3. Muscle weakness (generalized)  As above    4. Abnormal lung sounds  Will need a chest x-ray    5. Tachypnea  As above    Subjective   Omar is a 73 year old, presenting for the following health issues:  Fever (Fevers, loss appetite, and fatigue (negative Covid test 4/23/24) )        4/24/2024    12:03 PM   Additional Questions   Roomed by ERINN Bruno   Accompanied by alone         4/24/2024    12:03 PM   Patient Reported Additional Medications   Patient reports taking the following new medications none     History of Present Illness       Reason for visit:  Fever which is persisdent weekness unable to eat  Symptom onset:  3-7 days ago  Symptoms include:  103 fever  Symptom intensity:  Moderate  Symptom progression:  Staying the same  Had these symptoms before:  No  What makes it better:  Sleep    He eats 2-3 servings of fruits and vegetables daily.He consumes 0 sweetened beverage(s) daily.He exercises with enough effort to increase his heart rate 9 or less minutes per day.  He exercises with enough effort to increase his heart rate 3 or less days per week.   He is taking medications regularly.         Omar comes in today as an urgent add-on.  He was sent over by our call center.  He was planning on going to the emergency room because he has been feeling so poorly but they sent him here.  He has had a fever now for 5 straight days.  He cannot eat.  He is getting very weak.  Unsteady on his feet.  He is making urine but it sounds like it has been dark.  He has been taking  "some Tylenol which helps break the fever but otherwise it is persistent.  He denies any other symptoms.  He denies chest pain.  Denies cough denies URI symptoms, denies diarrhea, denies dysuria.  He is otherwise very healthy and was recently here for a physical and feeling quite good.  Wife had an illness recently that was nondescript and slightly similar.  But resolved quickly.            Objective    /74 (BP Location: Left arm, Patient Position: Sitting, Cuff Size: Adult Regular)   Pulse 94   Temp (!) 102.9  F (39.4  C) (Tympanic)   Resp 16   Ht 1.905 m (6' 3\")   Wt 102.5 kg (226 lb)   SpO2 96%   BMI 28.25 kg/m    Body mass index is 28.25 kg/m .  Physical Exam   He looks unwell.  He is tachypneic.  He is weak and unsteady.  Mildly tachycardic.  Abdomen soft and nontender.  Lungs reveal some crackles at the left base            Signed Electronically by: PERICO HERRERA MD    "

## 2024-04-24 NOTE — ED PROVIDER NOTES
EMERGENCY DEPARTMENT SIGNOUT NOTE    Patient signed out to me from Dr. Arango.      Omar Perez is a 73 year old male with fever signed out to me pending CT imaging.  CT imaging unremarkable.  Strep test negative.  EKG unremarkable.  Patient able to ambulate here without difficulty.  No recent tick bites, bug bites, etc.  Offered admission, but patient does not want to be admitted and frankly does not have any risk factors for bacteremia and with remainder of his labs being unremarkable I do think he is safe for discharge to home.       RADIOLOGY/LABS:  Reviewed all pertinent imaging. Please see official radiology report. All pertinent labs reviewed and interpreted.    Results for orders placed or performed during the hospital encounter of 04/24/24   CT Chest Pulmonary Embolism w Contrast    Impression    IMPRESSION:  1.  Negative for pulmonary emboli and negative for dissections.    2.  No significant findings in the lungs.    3.  Mild generalized cardiac enlargement.    4.  Stable 4.1 cm mild dilatation ascending thoracic aorta.   CT Abdomen Pelvis w Contrast    Impression    IMPRESSION:   1.  No acute findings in the abdomen and pelvis to explain the patient's pain.    2.  Moderate prostatic hypertrophy.    3.  Mild scattered diverticula in the colon.   Basic metabolic panel   Result Value Ref Range    Sodium 130 (L) 135 - 145 mmol/L    Potassium 4.3 3.4 - 5.3 mmol/L    Chloride 95 (L) 98 - 107 mmol/L    Carbon Dioxide (CO2) 21 (L) 22 - 29 mmol/L    Anion Gap 14 7 - 15 mmol/L    Urea Nitrogen 18.3 8.0 - 23.0 mg/dL    Creatinine 1.50 (H) 0.67 - 1.17 mg/dL    GFR Estimate 49 (L) >60 mL/min/1.73m2    Calcium 9.2 8.8 - 10.2 mg/dL    Glucose 122 (H) 70 - 99 mg/dL   Hepatic function panel   Result Value Ref Range    Protein Total 7.9 6.4 - 8.3 g/dL    Albumin 4.0 3.5 - 5.2 g/dL    Bilirubin Total 0.7 <=1.2 mg/dL    Alkaline Phosphatase 162 (H) 40 - 150 U/L    AST 35 0 - 45 U/L    ALT 36 0 - 70 U/L    Bilirubin  Direct 0.32 (H) 0.00 - 0.30 mg/dL   Result Value Ref Range    Lipase 29 13 - 60 U/L   UA with Microscopic reflex to Culture    Specimen: Urine, NOS   Result Value Ref Range    Color Urine Yellow Colorless, Straw, Light Yellow, Yellow    Appearance Urine Clear Clear    Glucose Urine Negative Negative mg/dL    Bilirubin Urine Negative Negative    Ketones Urine Negative Negative mg/dL    Specific Gravity Urine 1.020 1.001 - 1.030    Blood Urine 0.06 mg/dL (A) Negative    pH Urine 5.5 5.0 - 7.0    Protein Albumin Urine 70 (A) Negative mg/dL    Urobilinogen Urine <2.0 <2.0 mg/dL    Nitrite Urine Negative Negative    Leukocyte Esterase Urine Negative Negative    Mucus Urine Present (A) None Seen /LPF    RBC Urine 3 (H) <=2 /HPF    WBC Urine 3 <=5 /HPF    Squamous Epithelials Urine 1 <=1 /HPF    Hyaline Casts Urine 2 <=2 /LPF   Symptomatic Influenza A/B, RSV, & SARS-CoV2 PCR (COVID-19) Nasopharyngeal    Specimen: Nasopharyngeal; Swab   Result Value Ref Range    Influenza A PCR Negative Negative    Influenza B PCR Negative Negative    RSV PCR Negative Negative    SARS CoV2 PCR Negative Negative   Lactic acid whole blood   Result Value Ref Range    Lactic Acid 1.4 0.7 - 2.0 mmol/L   CBC with platelets and differential   Result Value Ref Range    WBC Count 9.7 4.0 - 11.0 10e3/uL    RBC Count 4.78 4.40 - 5.90 10e6/uL    Hemoglobin 14.7 13.3 - 17.7 g/dL    Hematocrit 42.3 40.0 - 53.0 %    MCV 89 78 - 100 fL    MCH 30.8 26.5 - 33.0 pg    MCHC 34.8 31.5 - 36.5 g/dL    RDW 13.0 10.0 - 15.0 %    Platelet Count 207 150 - 450 10e3/uL    % Neutrophils 85 %    % Lymphocytes 3 %    % Monocytes 11 %    % Eosinophils 1 %    % Basophils 0 %    % Immature Granulocytes 0 %    NRBCs per 100 WBC 0 <1 /100    Absolute Neutrophils 8.2 1.6 - 8.3 10e3/uL    Absolute Lymphocytes 0.3 (L) 0.8 - 5.3 10e3/uL    Absolute Monocytes 1.1 0.0 - 1.3 10e3/uL    Absolute Eosinophils 0.1 0.0 - 0.7 10e3/uL    Absolute Basophils 0.0 0.0 - 0.2 10e3/uL    Absolute  Immature Granulocytes 0.0 <=0.4 10e3/uL    Absolute NRBCs 0.0 10e3/uL       EKG:  Sinus rhythm rate 76.  No notable ST or T wave abnormalities normal intervals.   QTc 438.  Compared to previous EKG from 2/3/2022 no significant interval change  I have independently reviewed and interpreted the EKG(s) documented above.        ED COURSE :  Pertinent Labs & Imaging studies reviewed. (See chart for details)      ED Course as of 04/24/24 1459   Wed Apr 24, 2024   1432 In [place] last week, then chills and fever intermittently, gen weak. Febrile here.    1433 Creatinine(!): 1.50  Issa, gettting IVF bolus   1440 CT Chest Pulmonary Embolism w Contrast  CT abdomen interpreted by myself without visualized PE or pneumonia       FINAL IMPRESSION:  1. Febrile illness, acute    2. Generalized weakness    3. Anorexia        Ada Proctor MD  Emergency Medicine  [facility] EMERGENCY ROOM  [address] [facility] [address] [phone]  Dept: [phone]     Ada Proctor MD  04/24/24 5531

## 2024-04-24 NOTE — DISCHARGE INSTRUCTIONS
Cause of your fever identified here in the emergency department.  Blood work, CT scan really all unremarkable except for a little bit of kidney insufficiency.  We will call you if your blood cultures popped positive.  Please call your PCP tomorrow with an update as they send you to the ER.

## 2024-04-24 NOTE — ED NOTES
Pt ambulated down the hallway with standby assist. Pt reports that he didn't feel any more SOB or tired after. His VS were stable post ambulation.

## 2024-04-25 ENCOUNTER — HOSPITAL ENCOUNTER (INPATIENT)
Facility: CLINIC | Age: 74
LOS: 3 days | Discharge: HOME | DRG: 309 | End: 2024-04-30
Attending: EMERGENCY MEDICINE | Admitting: HOSPITALIST
Payer: COMMERCIAL

## 2024-04-25 DIAGNOSIS — I48.0 PAROXYSMAL ATRIAL FIBRILLATION (H): Primary | ICD-10-CM

## 2024-04-25 DIAGNOSIS — E86.0 DEHYDRATION: ICD-10-CM

## 2024-04-25 DIAGNOSIS — I42.9 CARDIOMYOPATHY, UNSPECIFIED TYPE (H): ICD-10-CM

## 2024-04-25 DIAGNOSIS — R50.9 FEBRILE ILLNESS: ICD-10-CM

## 2024-04-25 PROBLEM — N17.9 AKI (ACUTE KIDNEY INJURY) (H): Status: ACTIVE | Noted: 2024-04-25

## 2024-04-25 LAB
ALBUMIN SERPL BCG-MCNC: 3.6 G/DL (ref 3.5–5.2)
ALBUMIN UR-MCNC: 100 MG/DL
ALP SERPL-CCNC: 145 U/L (ref 40–150)
ALT SERPL W P-5'-P-CCNC: 31 U/L (ref 0–70)
ANION GAP SERPL CALCULATED.3IONS-SCNC: 12 MMOL/L (ref 7–15)
APPEARANCE UR: ABNORMAL
AST SERPL W P-5'-P-CCNC: 34 U/L (ref 0–45)
BASOPHILS # BLD AUTO: 0 10E3/UL (ref 0–0.2)
BASOPHILS NFR BLD AUTO: 0 %
BILIRUB SERPL-MCNC: 0.6 MG/DL
BILIRUB UR QL STRIP: NEGATIVE
BUN SERPL-MCNC: 17 MG/DL (ref 8–23)
CALCIUM SERPL-MCNC: 9.3 MG/DL (ref 8.8–10.2)
CHLORIDE SERPL-SCNC: 95 MMOL/L (ref 98–107)
COLOR UR AUTO: YELLOW
CREAT SERPL-MCNC: 1.42 MG/DL (ref 0.67–1.17)
DEPRECATED HCO3 PLAS-SCNC: 23 MMOL/L (ref 22–29)
EGFRCR SERPLBLD CKD-EPI 2021: 52 ML/MIN/1.73M2
EOSINOPHIL # BLD AUTO: 0.4 10E3/UL (ref 0–0.7)
EOSINOPHIL NFR BLD AUTO: 3 %
ERYTHROCYTE [DISTWIDTH] IN BLOOD BY AUTOMATED COUNT: 13.4 % (ref 10–15)
GLUCOSE SERPL-MCNC: 125 MG/DL (ref 70–99)
GLUCOSE UR STRIP-MCNC: NEGATIVE MG/DL
HCT VFR BLD AUTO: 41.3 % (ref 40–53)
HGB BLD-MCNC: 14.3 G/DL (ref 13.3–17.7)
HGB UR QL STRIP: ABNORMAL
HYALINE CASTS: 2 /LPF
IMM GRANULOCYTES # BLD: 0 10E3/UL
IMM GRANULOCYTES NFR BLD: 0 %
KETONES UR STRIP-MCNC: NEGATIVE MG/DL
LACTATE SERPL-SCNC: 1.6 MMOL/L (ref 0.7–2)
LEUKOCYTE ESTERASE UR QL STRIP: NEGATIVE
LIPASE SERPL-CCNC: 26 U/L (ref 13–60)
LYMPHOCYTES # BLD AUTO: 0.5 10E3/UL (ref 0.8–5.3)
LYMPHOCYTES NFR BLD AUTO: 4 %
MCH RBC QN AUTO: 30.5 PG (ref 26.5–33)
MCHC RBC AUTO-ENTMCNC: 34.6 G/DL (ref 31.5–36.5)
MCV RBC AUTO: 88 FL (ref 78–100)
MONOCYTES # BLD AUTO: 1.2 10E3/UL (ref 0–1.3)
MONOCYTES NFR BLD AUTO: 9 %
MUCOUS THREADS #/AREA URNS LPF: PRESENT /LPF
NEUTROPHILS # BLD AUTO: 11.5 10E3/UL (ref 1.6–8.3)
NEUTROPHILS NFR BLD AUTO: 84 %
NITRATE UR QL: NEGATIVE
NRBC # BLD AUTO: 0 10E3/UL
NRBC BLD AUTO-RTO: 0 /100
PH UR STRIP: 6 [PH] (ref 5–7)
PLATELET # BLD AUTO: 201 10E3/UL (ref 150–450)
POTASSIUM SERPL-SCNC: 4.3 MMOL/L (ref 3.4–5.3)
PROT SERPL-MCNC: 7.3 G/DL (ref 6.4–8.3)
RBC # BLD AUTO: 4.69 10E6/UL (ref 4.4–5.9)
RBC URINE: 3 /HPF
SODIUM SERPL-SCNC: 130 MMOL/L (ref 135–145)
SP GR UR STRIP: 1.03 (ref 1–1.03)
SQUAMOUS EPITHELIAL: <1 /HPF
UROBILINOGEN UR STRIP-MCNC: 2 MG/DL
WBC # BLD AUTO: 13.7 10E3/UL (ref 4–11)
WBC URINE: 4 /HPF

## 2024-04-25 PROCEDURE — 99222 1ST HOSP IP/OBS MODERATE 55: CPT | Mod: FS | Performed by: HOSPITALIST

## 2024-04-25 PROCEDURE — 250N000011 HC RX IP 250 OP 636: Performed by: HOSPITALIST

## 2024-04-25 PROCEDURE — 83690 ASSAY OF LIPASE: CPT | Performed by: EMERGENCY MEDICINE

## 2024-04-25 PROCEDURE — 81001 URINALYSIS AUTO W/SCOPE: CPT | Performed by: EMERGENCY MEDICINE

## 2024-04-25 PROCEDURE — 36415 COLL VENOUS BLD VENIPUNCTURE: CPT | Performed by: EMERGENCY MEDICINE

## 2024-04-25 PROCEDURE — 99207 PR APP CREDIT; MD BILLING SHARED VISIT: CPT

## 2024-04-25 PROCEDURE — 258N000003 HC RX IP 258 OP 636: Performed by: EMERGENCY MEDICINE

## 2024-04-25 PROCEDURE — 83605 ASSAY OF LACTIC ACID: CPT | Performed by: EMERGENCY MEDICINE

## 2024-04-25 PROCEDURE — 86757 RICKETTSIA ANTIBODY: CPT | Performed by: HOSPITALIST

## 2024-04-25 PROCEDURE — 96374 THER/PROPH/DIAG INJ IV PUSH: CPT

## 2024-04-25 PROCEDURE — 87469 BABESIA MICROTI AMP PRB: CPT | Performed by: EMERGENCY MEDICINE

## 2024-04-25 PROCEDURE — 86618 LYME DISEASE ANTIBODY: CPT | Performed by: EMERGENCY MEDICINE

## 2024-04-25 PROCEDURE — G0378 HOSPITAL OBSERVATION PER HR: HCPCS

## 2024-04-25 PROCEDURE — 96361 HYDRATE IV INFUSION ADD-ON: CPT

## 2024-04-25 PROCEDURE — 87015 SPECIMEN INFECT AGNT CONCNTJ: CPT | Performed by: EMERGENCY MEDICINE

## 2024-04-25 PROCEDURE — 99285 EMERGENCY DEPT VISIT HI MDM: CPT | Mod: 25

## 2024-04-25 PROCEDURE — 85025 COMPLETE CBC W/AUTO DIFF WBC: CPT | Performed by: EMERGENCY MEDICINE

## 2024-04-25 PROCEDURE — 80053 COMPREHEN METABOLIC PANEL: CPT | Performed by: EMERGENCY MEDICINE

## 2024-04-25 PROCEDURE — 87484 EHRLICHA CHAFFEENSIS AMP PRB: CPT | Performed by: EMERGENCY MEDICINE

## 2024-04-25 PROCEDURE — 36415 COLL VENOUS BLD VENIPUNCTURE: CPT | Performed by: HOSPITALIST

## 2024-04-25 PROCEDURE — 87040 BLOOD CULTURE FOR BACTERIA: CPT | Performed by: EMERGENCY MEDICINE

## 2024-04-25 PROCEDURE — 258N000003 HC RX IP 258 OP 636: Performed by: HOSPITALIST

## 2024-04-25 RX ORDER — SODIUM CHLORIDE, SODIUM LACTATE, POTASSIUM CHLORIDE, CALCIUM CHLORIDE 600; 310; 30; 20 MG/100ML; MG/100ML; MG/100ML; MG/100ML
INJECTION, SOLUTION INTRAVENOUS CONTINUOUS
Status: DISCONTINUED | OUTPATIENT
Start: 2024-04-25 | End: 2024-04-26

## 2024-04-25 RX ORDER — ACETAMINOPHEN 325 MG/1
975 TABLET ORAL EVERY 8 HOURS PRN
Status: DISCONTINUED | OUTPATIENT
Start: 2024-04-25 | End: 2024-04-30 | Stop reason: HOSPADM

## 2024-04-25 RX ORDER — ONDANSETRON 2 MG/ML
4 INJECTION INTRAMUSCULAR; INTRAVENOUS ONCE
Status: DISCONTINUED | OUTPATIENT
Start: 2024-04-25 | End: 2024-04-30 | Stop reason: HOSPADM

## 2024-04-25 RX ORDER — ONDANSETRON 2 MG/ML
4 INJECTION INTRAMUSCULAR; INTRAVENOUS EVERY 6 HOURS PRN
Status: DISCONTINUED | OUTPATIENT
Start: 2024-04-25 | End: 2024-04-30 | Stop reason: HOSPADM

## 2024-04-25 RX ORDER — DOCUSATE SODIUM 100 MG/1
100 CAPSULE, LIQUID FILLED ORAL 2 TIMES DAILY PRN
Status: DISCONTINUED | OUTPATIENT
Start: 2024-04-25 | End: 2024-04-30 | Stop reason: HOSPADM

## 2024-04-25 RX ORDER — ACETAMINOPHEN 500 MG
500-1000 TABLET ORAL EVERY 6 HOURS PRN
COMMUNITY

## 2024-04-25 RX ORDER — AMOXICILLIN 250 MG
1 CAPSULE ORAL 2 TIMES DAILY PRN
Status: DISCONTINUED | OUTPATIENT
Start: 2024-04-25 | End: 2024-04-30 | Stop reason: HOSPADM

## 2024-04-25 RX ORDER — ONDANSETRON 4 MG/1
4 TABLET, ORALLY DISINTEGRATING ORAL EVERY 6 HOURS PRN
Status: DISCONTINUED | OUTPATIENT
Start: 2024-04-25 | End: 2024-04-30 | Stop reason: HOSPADM

## 2024-04-25 RX ORDER — PROCHLORPERAZINE 25 MG
12.5 SUPPOSITORY, RECTAL RECTAL EVERY 12 HOURS PRN
Status: DISCONTINUED | OUTPATIENT
Start: 2024-04-25 | End: 2024-04-30 | Stop reason: HOSPADM

## 2024-04-25 RX ORDER — AMOXICILLIN 250 MG
2 CAPSULE ORAL 2 TIMES DAILY PRN
Status: DISCONTINUED | OUTPATIENT
Start: 2024-04-25 | End: 2024-04-30 | Stop reason: HOSPADM

## 2024-04-25 RX ORDER — DOXYCYCLINE 100 MG/10ML
100 INJECTION, POWDER, LYOPHILIZED, FOR SOLUTION INTRAVENOUS EVERY 12 HOURS
Status: DISCONTINUED | OUTPATIENT
Start: 2024-04-25 | End: 2024-04-29

## 2024-04-25 RX ORDER — PROCHLORPERAZINE MALEATE 5 MG
5 TABLET ORAL EVERY 6 HOURS PRN
Status: DISCONTINUED | OUTPATIENT
Start: 2024-04-25 | End: 2024-04-30 | Stop reason: HOSPADM

## 2024-04-25 RX ADMIN — DOXYCYCLINE 100 MG: 100 INJECTION, POWDER, LYOPHILIZED, FOR SOLUTION INTRAVENOUS at 22:11

## 2024-04-25 RX ADMIN — SODIUM CHLORIDE, POTASSIUM CHLORIDE, SODIUM LACTATE AND CALCIUM CHLORIDE: 600; 310; 30; 20 INJECTION, SOLUTION INTRAVENOUS at 22:11

## 2024-04-25 RX ADMIN — SODIUM CHLORIDE 1000 ML: 9 INJECTION, SOLUTION INTRAVENOUS at 20:10

## 2024-04-25 ASSESSMENT — ACTIVITIES OF DAILY LIVING (ADL)
ADLS_ACUITY_SCORE: 30
ADLS_ACUITY_SCORE: 35

## 2024-04-25 ASSESSMENT — COLUMBIA-SUICIDE SEVERITY RATING SCALE - C-SSRS
2. HAVE YOU ACTUALLY HAD ANY THOUGHTS OF KILLING YOURSELF IN THE PAST MONTH?: NO
6. HAVE YOU EVER DONE ANYTHING, STARTED TO DO ANYTHING, OR PREPARED TO DO ANYTHING TO END YOUR LIFE?: NO
1. IN THE PAST MONTH, HAVE YOU WISHED YOU WERE DEAD OR WISHED YOU COULD GO TO SLEEP AND NOT WAKE UP?: NO

## 2024-04-26 ENCOUNTER — APPOINTMENT (OUTPATIENT)
Dept: CARDIOLOGY | Facility: CLINIC | Age: 74
DRG: 309 | End: 2024-04-26
Attending: INTERNAL MEDICINE
Payer: COMMERCIAL

## 2024-04-26 LAB
ALBUMIN SERPL BCG-MCNC: 3.2 G/DL (ref 3.5–5.2)
ALP SERPL-CCNC: 128 U/L (ref 40–150)
ALT SERPL W P-5'-P-CCNC: 24 U/L (ref 0–70)
ANAPLASMA BLD MOD GIEMSA: NEGATIVE
ANION GAP SERPL CALCULATED.3IONS-SCNC: 12 MMOL/L (ref 7–15)
AST SERPL W P-5'-P-CCNC: 27 U/L (ref 0–45)
B BURGDOR IGG+IGM SER QL: 0.2
B MICROTI BLD SMEAR: NEGATIVE
BASOPHILS # BLD AUTO: 0 10E3/UL (ref 0–0.2)
BASOPHILS NFR BLD AUTO: 0 %
BI-PLANE LVEF ECHO: NORMAL
BILIRUB SERPL-MCNC: 0.5 MG/DL
BUN SERPL-MCNC: 18.1 MG/DL (ref 8–23)
C PNEUM DNA SPEC QL NAA+PROBE: ABNORMAL
C PNEUM DNA SPEC QL NAA+PROBE: NOT DETECTED
CALCIUM SERPL-MCNC: 9 MG/DL (ref 8.8–10.2)
CHLORIDE SERPL-SCNC: 100 MMOL/L (ref 98–107)
CREAT SERPL-MCNC: 1.12 MG/DL (ref 0.67–1.17)
DEPRECATED HCO3 PLAS-SCNC: 20 MMOL/L (ref 22–29)
EGFRCR SERPLBLD CKD-EPI 2021: 69 ML/MIN/1.73M2
EHRLICHIA SPEC QL MICRO: NEGATIVE
EOSINOPHIL # BLD AUTO: 0.4 10E3/UL (ref 0–0.7)
EOSINOPHIL NFR BLD AUTO: 4 %
ERYTHROCYTE [DISTWIDTH] IN BLOOD BY AUTOMATED COUNT: 13.6 % (ref 10–15)
FLUAV H1 2009 PAND RNA SPEC QL NAA+PROBE: ABNORMAL
FLUAV H1 2009 PAND RNA SPEC QL NAA+PROBE: NOT DETECTED
FLUAV H1 RNA SPEC QL NAA+PROBE: ABNORMAL
FLUAV H1 RNA SPEC QL NAA+PROBE: NOT DETECTED
FLUAV H3 RNA SPEC QL NAA+PROBE: ABNORMAL
FLUAV H3 RNA SPEC QL NAA+PROBE: NOT DETECTED
FLUAV RNA SPEC QL NAA+PROBE: ABNORMAL
FLUAV RNA SPEC QL NAA+PROBE: NOT DETECTED
FLUBV RNA SPEC QL NAA+PROBE: ABNORMAL
FLUBV RNA SPEC QL NAA+PROBE: NOT DETECTED
GLUCOSE SERPL-MCNC: 114 MG/DL (ref 70–99)
HADV DNA SPEC QL NAA+PROBE: ABNORMAL
HADV DNA SPEC QL NAA+PROBE: NOT DETECTED
HCOV PNL SPEC NAA+PROBE: ABNORMAL
HCOV PNL SPEC NAA+PROBE: NOT DETECTED
HCT VFR BLD AUTO: 38.8 % (ref 40–53)
HGB BLD-MCNC: 13.2 G/DL (ref 13.3–17.7)
HMPV RNA SPEC QL NAA+PROBE: ABNORMAL
HMPV RNA SPEC QL NAA+PROBE: NOT DETECTED
HPIV1 RNA SPEC QL NAA+PROBE: ABNORMAL
HPIV1 RNA SPEC QL NAA+PROBE: NOT DETECTED
HPIV2 RNA SPEC QL NAA+PROBE: ABNORMAL
HPIV2 RNA SPEC QL NAA+PROBE: NOT DETECTED
HPIV3 RNA SPEC QL NAA+PROBE: ABNORMAL
HPIV3 RNA SPEC QL NAA+PROBE: NOT DETECTED
HPIV4 RNA SPEC QL NAA+PROBE: ABNORMAL
HPIV4 RNA SPEC QL NAA+PROBE: NOT DETECTED
IMM GRANULOCYTES # BLD: 0.1 10E3/UL
IMM GRANULOCYTES NFR BLD: 0 %
LIPASE SERPL-CCNC: 20 U/L (ref 13–60)
LYMPHOCYTES # BLD AUTO: 0.5 10E3/UL (ref 0.8–5.3)
LYMPHOCYTES NFR BLD AUTO: 4 %
M PNEUMO DNA SPEC QL NAA+PROBE: ABNORMAL
M PNEUMO DNA SPEC QL NAA+PROBE: NOT DETECTED
MCH RBC QN AUTO: 30.5 PG (ref 26.5–33)
MCHC RBC AUTO-ENTMCNC: 34 G/DL (ref 31.5–36.5)
MCV RBC AUTO: 90 FL (ref 78–100)
MONOCYTES # BLD AUTO: 0.9 10E3/UL (ref 0–1.3)
MONOCYTES NFR BLD AUTO: 7 %
NEUTROPHILS # BLD AUTO: 10 10E3/UL (ref 1.6–8.3)
NEUTROPHILS NFR BLD AUTO: 84 %
NRBC # BLD AUTO: 0 10E3/UL
NRBC BLD AUTO-RTO: 0 /100
PLATELET # BLD AUTO: 180 10E3/UL (ref 150–450)
POTASSIUM SERPL-SCNC: 4.4 MMOL/L (ref 3.4–5.3)
PROCALCITONIN SERPL IA-MCNC: 0.99 NG/ML
PROT SERPL-MCNC: 6.4 G/DL (ref 6.4–8.3)
PSA SERPL DL<=0.01 NG/ML-MCNC: 4.43 NG/ML (ref 0–6.5)
RBC # BLD AUTO: 4.33 10E6/UL (ref 4.4–5.9)
RSV RNA SPEC QL NAA+PROBE: ABNORMAL
RSV RNA SPEC QL NAA+PROBE: ABNORMAL
RSV RNA SPEC QL NAA+PROBE: NOT DETECTED
RSV RNA SPEC QL NAA+PROBE: NOT DETECTED
RV+EV RNA SPEC QL NAA+PROBE: ABNORMAL
RV+EV RNA SPEC QL NAA+PROBE: NOT DETECTED
SODIUM SERPL-SCNC: 132 MMOL/L (ref 135–145)
WBC # BLD AUTO: 11.9 10E3/UL (ref 4–11)

## 2024-04-26 PROCEDURE — 250N000011 HC RX IP 250 OP 636: Performed by: HOSPITALIST

## 2024-04-26 PROCEDURE — 85025 COMPLETE CBC W/AUTO DIFF WBC: CPT | Performed by: HOSPITALIST

## 2024-04-26 PROCEDURE — 250N000011 HC RX IP 250 OP 636: Performed by: INTERNAL MEDICINE

## 2024-04-26 PROCEDURE — 80053 COMPREHEN METABOLIC PANEL: CPT | Performed by: HOSPITALIST

## 2024-04-26 PROCEDURE — 255N000002 HC RX 255 OP 636: Performed by: STUDENT IN AN ORGANIZED HEALTH CARE EDUCATION/TRAINING PROGRAM

## 2024-04-26 PROCEDURE — 258N000003 HC RX IP 258 OP 636: Performed by: STUDENT IN AN ORGANIZED HEALTH CARE EDUCATION/TRAINING PROGRAM

## 2024-04-26 PROCEDURE — 999N000208 ECHOCARDIOGRAM COMPLETE

## 2024-04-26 PROCEDURE — 87633 RESP VIRUS 12-25 TARGETS: CPT | Performed by: STUDENT IN AN ORGANIZED HEALTH CARE EDUCATION/TRAINING PROGRAM

## 2024-04-26 PROCEDURE — 96361 HYDRATE IV INFUSION ADD-ON: CPT

## 2024-04-26 PROCEDURE — G0378 HOSPITAL OBSERVATION PER HR: HCPCS

## 2024-04-26 PROCEDURE — 36415 COLL VENOUS BLD VENIPUNCTURE: CPT | Performed by: HOSPITALIST

## 2024-04-26 PROCEDURE — 250N000013 HC RX MED GY IP 250 OP 250 PS 637: Performed by: HOSPITALIST

## 2024-04-26 PROCEDURE — 83690 ASSAY OF LIPASE: CPT | Performed by: HOSPITALIST

## 2024-04-26 PROCEDURE — 84153 ASSAY OF PSA TOTAL: CPT | Performed by: INTERNAL MEDICINE

## 2024-04-26 PROCEDURE — 99232 SBSQ HOSP IP/OBS MODERATE 35: CPT | Performed by: STUDENT IN AN ORGANIZED HEALTH CARE EDUCATION/TRAINING PROGRAM

## 2024-04-26 PROCEDURE — 93306 TTE W/DOPPLER COMPLETE: CPT | Mod: 26 | Performed by: INTERNAL MEDICINE

## 2024-04-26 PROCEDURE — 99204 OFFICE O/P NEW MOD 45 MIN: CPT | Performed by: INTERNAL MEDICINE

## 2024-04-26 PROCEDURE — 96375 TX/PRO/DX INJ NEW DRUG ADDON: CPT

## 2024-04-26 PROCEDURE — 96376 TX/PRO/DX INJ SAME DRUG ADON: CPT

## 2024-04-26 PROCEDURE — 84145 PROCALCITONIN (PCT): CPT | Performed by: STUDENT IN AN ORGANIZED HEALTH CARE EDUCATION/TRAINING PROGRAM

## 2024-04-26 RX ORDER — CEFTRIAXONE 2 G/1
2 INJECTION, POWDER, FOR SOLUTION INTRAMUSCULAR; INTRAVENOUS EVERY 24 HOURS
Status: DISCONTINUED | OUTPATIENT
Start: 2024-04-26 | End: 2024-04-28

## 2024-04-26 RX ORDER — CALCIUM CARBONATE 500 MG/1
1000 TABLET, CHEWABLE ORAL 3 TIMES DAILY PRN
Status: DISCONTINUED | OUTPATIENT
Start: 2024-04-26 | End: 2024-04-30 | Stop reason: HOSPADM

## 2024-04-26 RX ORDER — SODIUM CHLORIDE, SODIUM LACTATE, POTASSIUM CHLORIDE, CALCIUM CHLORIDE 600; 310; 30; 20 MG/100ML; MG/100ML; MG/100ML; MG/100ML
INJECTION, SOLUTION INTRAVENOUS CONTINUOUS
Status: ACTIVE | OUTPATIENT
Start: 2024-04-26 | End: 2024-04-26

## 2024-04-26 RX ADMIN — DOXYCYCLINE 100 MG: 100 INJECTION, POWDER, LYOPHILIZED, FOR SOLUTION INTRAVENOUS at 09:59

## 2024-04-26 RX ADMIN — CALCIUM CARBONATE (ANTACID) CHEW TAB 500 MG 1000 MG: 500 CHEW TAB at 00:50

## 2024-04-26 RX ADMIN — CEFTRIAXONE SODIUM 2 G: 2 INJECTION, POWDER, FOR SOLUTION INTRAMUSCULAR; INTRAVENOUS at 11:32

## 2024-04-26 RX ADMIN — Medication 1 MG: at 22:39

## 2024-04-26 RX ADMIN — DOXYCYCLINE 100 MG: 100 INJECTION, POWDER, LYOPHILIZED, FOR SOLUTION INTRAVENOUS at 22:39

## 2024-04-26 RX ADMIN — SODIUM CHLORIDE, POTASSIUM CHLORIDE, SODIUM LACTATE AND CALCIUM CHLORIDE: 600; 310; 30; 20 INJECTION, SOLUTION INTRAVENOUS at 09:59

## 2024-04-26 RX ADMIN — ACETAMINOPHEN 975 MG: 325 TABLET ORAL at 00:52

## 2024-04-26 RX ADMIN — CALCIUM CARBONATE (ANTACID) CHEW TAB 500 MG 1000 MG: 500 CHEW TAB at 13:45

## 2024-04-26 RX ADMIN — CALCIUM CARBONATE (ANTACID) CHEW TAB 500 MG 1000 MG: 500 CHEW TAB at 22:39

## 2024-04-26 RX ADMIN — PERFLUTREN 3 ML: 6.52 INJECTION, SUSPENSION INTRAVENOUS at 13:52

## 2024-04-26 ASSESSMENT — ACTIVITIES OF DAILY LIVING (ADL)
ADLS_ACUITY_SCORE: 30

## 2024-04-26 NOTE — PROGRESS NOTES
"PRIMARY DIAGNOSIS: \"GENERIC\" NURSING  OUTPATIENT/OBSERVATION GOALS TO BE MET BEFORE DISCHARGE:  ADLs back to baseline: No    Activity and level of assistance: Ambulating independently.    Pain status: Pain free.    Return to near baseline physical activity: No    "

## 2024-04-26 NOTE — ED TRIAGE NOTES
"To ED per POV, accompanied by wife     last night was treated for high fever (103F) and weakness. Discharged and was still feeling weak.      has same CC today. Took Tylenol 3 hours PTA. Temp in triage 98.1.    States he feels \"dehydrated\" and that his urine is dark.      Triage Assessment (Adult)       Row Name 04/25/24 1942          Triage Assessment    Airway WDL WDL        Respiratory WDL    Respiratory WDL WDL        Skin Circulation/Temperature WDL    Skin Circulation/Temperature WDL WDL        Cardiac WDL    Cardiac WDL WDL        Peripheral/Neurovascular WDL    Peripheral Neurovascular WDL WDL        Cognitive/Neuro/Behavioral WDL    Cognitive/Neuro/Behavioral WDL WDL                     "

## 2024-04-26 NOTE — H&P
[facility]    History and Physical - Hospitalist Service       Date of Admission:  4/25/2024    Assessment & Plan      Omar Perez is a 73 year old male with PMHx significant for dilated thoracic aorta, BPH who was admitted on 4/25/2024. He presented with generalized weakness, nausea, and fevers.    Initially presented to the ED here with same symptoms on 4/24/2024. Lab workup (including CMP, CBC, UA, viral panel, group A strep, lipase, lactic acid, CK, blood cultures) and CT imaging of the chest, abdomen, and pelvis was grossly negative with no clear explanation for patient's symptoms. Patient was discharged home from ED but returned to the ED today due to feeling worse.    ED Course: Hemodynamically stable, afebrile. Lab workup today is remarkable for mild AMADA with Cr 1.42, leukocytosis with WBC 13.7. UA with some red cells present, but no indication of infection. Repeat blood cultures, full respiratory panel, and testing for parasites and ehrlichia ordered. Given 1 L IV NS.      Generalized weakness  Acute febrile illness  Presenting with intermittent fevers up to 103 F, persistent nausea without vomiting, and generalized weakness over the past week with grossly negative workup and unclear etiology.  - Supportive care with IV LR at 125 mL/h, antiemetics, analgesics PRN  - Start doxycycline 100 mg IV q12h to cover for potential tickborne illness like RMSF  - Infectious Disease consult  - Follow pending blood tests (Ehrlichia, Lyme, Babesia, respiratory panel), added Rickettsia IgG and IgM  - Follow blood cultures  - AM labs: CMP, CBC, lipase    Acute kidney injury  Dehydration  Cr 1.42 on arrival, down slightly from 1.50 the day prior. Likely due to dehydration and poor oral intake.  - IV fluids as above  - Avoid nephrotoxic agents  - Recheck Cr in AM    Dilation of ascending thoracic aorta, chronic  Stable at 4.1 cm per CT Chest from 4/24/2024.    Benign prostatic hyperplasia  Not  "on any medications for this. No new urinary symptoms.       Observation Goals: -diagnostic tests and consults completed and resulted, -vital signs normal or at patient baseline, -tolerating oral intake to maintain hydration, Nurse to notify provider when observation goals have been met and patient is ready for discharge.  Diet: Clear Liquid Diet  DVT Prophylaxis: Pneumatic Compression Devices  [place] Catheter: Not present  Lines: None     Cardiac Monitoring: None  Code Status: Full Code    Clinically Significant Risk Factors Present on Admission                       # Overweight: Estimated body mass index is 28.62 kg/m  as calculated from the following:    Height as of this encounter: 1.905 m (6' 3\").    Weight as of this encounter: 103.9 kg (229 lb).              Disposition Plan     Medically Ready for Discharge: Anticipated Tomorrow         The patient's care was discussed with the Attending Physician, Dr. Tristian Pereira .    Harriett Tapia PA-C  Hospitalist Service  [facility]  Securely message with [employer] (more info)  Text page via [facility] Paging/Directory     ______________________________________________________________________    Chief Complaint   Generalized weakness    History is obtained from the patient    History of Present Illness   Omar Perez is an otherwise healthy 73 year old male with PMHx significant for dilated thoracic aorta, BPH who presented for evaluation of generalized weakness, nausea, and fevers. Patient was previously sent home from the ED yesterday but now feels too weak to remain at home.    Patient was recently in [place] visiting his family last week when he started feeling unwell. He reports a few fevers up to 103 F which resolved with Tylenol, progressive generalized weakness with now inability to safely ambulate without difficulty, and loss of appetite. Patient becomes nauseous every time he eats so he has not eaten much over the past several days. He " denies vomiting or diarrhea. He reports constipation for several days, so he took a stool softener yesterday and was able to pass stool. He does report minimal blood in the stool but does note history of hemorrhoids in the past. He is passing gas. He also reports mild intermittent headaches which are short lived. He denies any known encounters with ticks or any animals. He reports no sick contacts, and no one else who has developed similar symptoms. He denies chest pain, shortness of breath, urinary symptoms, lightheadedness, numbness/tingling, rash, joint pain/swelling.      Past Medical History    History reviewed. No pertinent past medical history.    Past Surgical History   Past Surgical History:   Procedure Laterality Date    HERNIA REPAIR         Prior to Admission Medications   Prior to Admission Medications   Prescriptions Last Dose Informant Patient Reported? Taking?   acetaminophen (TYLENOL) 500 MG tablet 4/25/2024 at 1600; 500 mg  Yes Yes   Sig: Take 500-1,000 mg by mouth every 6 hours as needed for mild pain or fever   vitamin D3 (CHOLECALCIFEROL) 50 mcg (2000 units) tablet 4/24/2024 at AM  Yes Yes   Sig: Take 1 tablet by mouth daily as needed      Facility-Administered Medications: None        Review of Systems    The 10 point Review of Systems is negative other than noted in the HPI or here.     Social History   I have reviewed this patient's social history and updated it with pertinent information if needed.  Social History     Tobacco Use    Smoking status: Never    Smokeless tobacco: Never   Vaping Use    Vaping status: Never Used   Substance Use Topics    Alcohol use: Yes     Alcohol/week: 2.0 standard drinks of alcohol    Drug use: Never         Allergies   No Known Allergies     Physical Exam   Vital Signs: Temp: 98.9  F (37.2  C) Temp src: Oral BP: 127/70 Pulse: 94   Resp: 20 SpO2: 95 % O2 Device: None (Room air)    Weight: 229 lbs 0 oz    General Appearance: Awake, alert though tired appearing,  in no acute distress.  Respiratory: Lungs clear to auscultation bilaterally, no wheezing, rales, or rhonchi.  Normal respiratory effort on room air.  Cardiovascular: Regular rate and rhythm, no murmurs, rubs, or gallops.  Extremities are warm and well-perfused.  GI: Soft, nontender, nondistended.  Normal bowel sounds. No palpated masses.  Skin: No obvious rashes or lesions on observed skin.  Musculoskeletal: Able to move all extremities freely.  No lower extremity edema.  Neurologic: Alert and oriented x 4.  Strength and sensation is grossly equal and intact in bilateral upper and lower extremities.  Psychiatric: Calm, pleasant, cooperative with exam.    Medical Decision Making       70 MINUTES SPENT BY ME on the date of service doing chart review, history, exam, documentation & further activities per the note.      Data     I have personally reviewed the following data over the past 24 hrs:    13.7 (H)  \   14.3   / 201     130 (L) 95 (L) 17.0 /  125 (H)   4.3 23 1.42 (H) \     ALT: 31 AST: 34 AP: 145 TBILI: 0.6   ALB: 3.6 TOT PROTEIN: 7.3 LIPASE: 26     Procal: N/A CRP: N/A Lactic Acid: 1.6         Imaging results reviewed over the past 24 hrs:   No results found for this or any previous visit (from the past 24 hour(s)).

## 2024-04-26 NOTE — CONSULTS
[facility]  General ID Service Consult      Patient: Omar Perez  YOB: 1950, MRN: [id number]  Date of Admission:  4/25/2024  Date of Consult: 04/26/2024  Consult Requested by: Lamar Gormna MD  Admission Diagnosis: Dehydration [E86.0]  Febrile illness [R50.9]  Consult Question: fever    ID Assessment & Plan   Acute febrile illness, no obvious etiology, might not be related to the travel given chronology of events  Mild leukocytosis , procalcitonin 0.99  BPH but no acute syx, microscopic hematuria  Negative COVID, flu, RSV,   Otherwise normal CT chest abdomen  Normal liver function test  Rash appears like heat rash except we need to monitor for more vesicular features    PLAN  Repeat respiratory multiplex PCR  Continue doxycycline and add ceftriaxone  Pending blood cultures  PSA  TTE  Monitor the rash      Mandy Cesar MD  [facility]  ______________________________________________________________________      History of Present Illness   Omar Perez is a 73 year old male, with fevers since last Saturday, on 4/21 about 5 days ago.  He flew to [place] Friday and developed fever then the following day.  Prior to his travel no unusual activities no camping no hiking.  He still do some real estate work but sparingly.  Over the following days temperature up to 103.  With sweats.  But otherwise no new nasal stuffiness sore throat cough shortness of breath chest pain abdominal pain diarrhea or dysuria or new back pain.  He was told he had a rash on admission.  No history of shingles or exposure to shingles.  Workup as above and he started on doxycycline.      Review of Systems   The 10 point Review of Systems is negative other than noted in the HPI or here.     Past Medical History    History reviewed. No pertinent past medical history.    Past Surgical History   Past Surgical History:   Procedure Laterality Date    HERNIA REPAIR         Social  History   Social History     Tobacco Use    Smoking status: Never    Smokeless tobacco: Never   Vaping Use    Vaping status: Never Used   Substance Use Topics    Alcohol use: Yes     Alcohol/week: 2.0 standard drinks of alcohol    Drug use: Never       Family History   I have reviewed this patient's family history and updated it with pertinent information if needed.  Family History   Problem Relation Age of Onset    Prostate Cancer Father     No Known Problems Daughter     Alcoholism Daughter        Medications   I have reviewed this patient's current medications    Allergies   No Known Allergies    Physical Exam   Vital Signs: Temp: 98.3  F (36.8  C) Temp src: Oral BP: 112/68 Pulse: 88   Resp: 18 SpO2: 97 % O2 Device: None (Room air)    Weight: 224 lbs 6.4 oz    Gen. appearance nontoxic  Eyes no conjunctivitis or icterus  Neck no stiffness or neck vein distention, no LN  Heart  No edema  Lungs breathing comfortably    Abdomen soft not tender  Extremities no synovitis, trace edema  Skin  rash back, like heat rash  Similar maculopaular chest/abdomen., sparse. Two look vesicular  Neurologic alert oriented no focal deficits      Data   Inflammatory Markers No lab results found.     Hematology Studies   Recent Labs   Lab Test 04/26/24 0630 04/25/24 2010 04/24/24  1327 04/11/24  1320 01/05/22  1655   WBC 11.9* 13.7* 9.7 5.4 5.8   HGB 13.2* 14.3 14.7 15.1 14.6   MCV 90 88 89 92 93    201 207 221 240       Metabolic Studies   Recent Labs   Lab Test 04/26/24 0630 04/25/24 2010 04/24/24  1327 04/11/24  1320 03/21/23  1523   * 130* 130* 138 138   POTASSIUM 4.4 4.3 4.3 4.5 4.6   CHLORIDE 100 95* 95* 102 105   CO2 20* 23 21* 25 24   BUN 18.1 17.0 18.3 19.8 19.9   CR 1.12 1.42* 1.50* 1.21* 1.19*   GFRESTIMATED 69 52* 49* 63 64       Hepatic Studies    Recent Labs   Lab Test 04/26/24  0630 04/25/24 2010 04/24/24  1327 04/11/24  1320 03/21/23  1523 01/05/22  1655   BILITOTAL 0.5 0.6 0.7 0.5 0.4 0.3   ALKPHOS 128  "145 162* 76 83 77   ALBUMIN 3.2* 3.6 4.0 4.4 4.3 4.0   AST 27 34 35 22 26 17   ALT 24 31 36 15 14 15       Most Recent 6 Bacteria Isolates From Any Culture (See EPIC Reports for Culture Details):No lab results found.    Urine Studies    Recent Labs   Lab Test 04/25/24 2010 04/24/24  1322 04/11/24  1357 03/21/23  1523 01/05/22  1655   LEUKEST Negative Negative Negative Negative Negative   WBCU 4 3  --   --   --        Vancomycin Levels  No lab results found.    Invalid input(s): \"VANCO\"    Hepatitis B Testing No lab results found.  Hepatitis C Testing     Hepatitis C Antibody   Date Value Ref Range Status   01/05/2022 Nonreactive Nonreactive Final     HIVTesting No lab results found.    Respiratory Virus Testing    No results found for: \"RS\", \"FLUAG\"  COVID-19 Antibody Results, Testing for Immunity           No data to display              COVID-19 PCR Results          4/24/2024    13:22   COVID-19 PCR Results   SARS CoV2 PCR Negative        "

## 2024-04-26 NOTE — PROGRESS NOTES
[facility] [place] MEDICINE  PROGRESS NOTE       Securely message me with Erasto (more info)    Code Status: Full Code       Identification/Summary:   Omar Perez is a 73 year old male with PMHx significant for dilated thoracic aorta, BPH who was admitted on 4/25/2024. He presented with generalized weakness, nausea, and fevers.  Test for atypical infection worsened.  Infectious disease were consulted.  Empiric doxycycline and ceftriaxone were started.       Barriers to Discharge: Fever of unknown origin under discovery    Disposition: observation    Assessment and Plan:  Generalized weakness  Acute febrile illness  Presenting with intermittent fevers up to 103 F, persistent nausea without vomiting, and generalized weakness over the past week with grossly negative workup and unclear etiology. Pro-Reinaldo is 0.99.   - Supportive care with IV LR at 125 mL/h > decreased to 75 mL/h given slight shortness of breath on 4/26, antiemetics, analgesics PRN  -4/25 start doxycycline 100 mg IV q12h to cover for potential tickborne illness like RMSF, ID added ceftriaxone on 4/26  - Infectious Disease consult  - Follow pending blood tests (Ehrlichia, Lyme, Babesia, respiratory panel), added Rickettsia IgG and IgM, ID added TTE, PSA  - Follow blood cultures  - AM labs: CMP, CBC, lipase     Acute kidney injury  Dehydration  Cr 1.42 on arrival, down slightly from 1.50 the day prior. Likely due to dehydration and poor oral intake.  - IV fluids as above  - Avoid nephrotoxic agents     Dilation of ascending thoracic aorta, chronic  Stable at 4.1 cm per CT Chest from 4/24/2024.     Benign prostatic hyperplasia  Not on any medications for this. No new urinary symptoms.     Urine protein  Follow-up as an outpatient      Anticoagulation   Orders (Includes Only Anticoagulants)    None      Therapy: None  [place]:Not present  Lines: None       Current Diet  Orders Placed This Encounter      Regular Diet Adult       "  Clinically Significant Risk Factors Present on Admission              # Hypoalbuminemia: Lowest albumin = 3.2 g/dL at 4/26/2024  6:30 AM, will monitor as appropriate          # Overweight: Estimated body mass index is 28.05 kg/m  as calculated from the following:    Height as of this encounter: 1.905 m (6' 3\").    Weight as of this encounter: 101.8 kg (224 lb 6.4 oz).              Interval History/Subjective:  She states he feels about the same.  He is still tired.  He had no more fever while in the hospital.  He reported rash on his back.  Denies new sexual partners.      Last 24H PRN:     acetaminophen (TYLENOL) tablet 975 mg, 975 mg at 04/26/24 0052    calcium carbonate (TUMS) chewable tablet 1,000 mg, 1,000 mg at 04/26/24 1345    Physical Exam/Objective:  Temp:  [97.7  F (36.5  C)-100  F (37.8  C)] 98.3  F (36.8  C)  Pulse:  [84-95] 88  Resp:  [16-22] 18  BP: ()/(57-70) 112/68  SpO2:  [95 %-98 %] 97 %  Wt Readings from Last 4 Encounters:   04/25/24 101.8 kg (224 lb 6.4 oz)   04/24/24 101.6 kg (224 lb)   04/24/24 102.5 kg (226 lb)   04/11/24 103.4 kg (228 lb)     Body mass index is 28.05 kg/m .    General Appearance: Alert and wake, not in distress  Respiratory: clear lungs, no crackles or wheezing  Cardiovascular: rhythmic, normal S1 and S2, no murmur  GI: soft, non-tender, normal bowel sound  Neurology: oriented x 3  Psych: cooperative and calm, normal affect  Skin: Papular rash diffusely on the back, no significant lymphadenopathy inguinal area or neck.    Medications:   Personally Reviewed.  Medications   Current Facility-Administered Medications   Medication Dose Route Frequency Provider Last Rate Last Admin    lactated ringers infusion   Intravenous Continuous Lamar Gorman MD 75 mL/hr at 04/26/24 0959 New Bag at 04/26/24 0959     Current Facility-Administered Medications   Medication Dose Route Frequency Provider Last Rate Last Admin    cefTRIAXone (ROCEPHIN) 2 g vial to attach to  ml bag for " ADULTS or NS 50 ml bag for PEDS  2 g Intravenous Q24H Mandy Neely MD   2 g at 24 1132    doxycycline (VIBRAMYCIN) 100 mg vial to attach to  mL bag  100 mg Intravenous Q12H Tristian Pereira, DO   100 mg at 24 0959    ondansetron (ZOFRAN) injection 4 mg  4 mg Intravenous Once Breanne Quintero MD           Data reviewed today: I personally reviewed all new medications, labs, imaging/diagnostics reports over the past 24 hours. Pertinent findings include:    Imaging:   Recent Results (from the past 24 hour(s))   Echocardiogram Complete   Result Value    Biplane LVEF 39%    Narrative    [id number]  [facility]  [id number]  [id number]^CHIN^MANDY^[place] [address]     Name: HARDIK CASTRO  MRN: [id number]  : 1950  Study Date: 2024 01:21 PM  Age: 73 yrs  Gender: Male  Patient Location: [facility]  Reason For Study: Endocarditis  Ordering Physician: MANDY NEELY  Performed By: CLYDE     BSA: 2.3 m2  Height: 75 in  Weight: 224 lb  HR: 90  BP: 112/68 mmHg  ______________________________________________________________________________  Procedure  Complete Portable Echo Adult. Definity (NDC #31117-779) given intravenously.  Adequate quality two-dimensional was performed and interpreted. No  hemodynamically significant valvular abnormalities on 2D or color flow  imaging. There is no comparison study available.  ______________________________________________________________________________  Interpretation Summary     1. Biplane LVEF is 39%. There is mild-moderate global hypokinesia of the left  ventricle.  2. Normal right ventricle size and systolic function.  3. No hemodynamically significant valvular abnormalities on 2D or color flow  imaging.  4. Aortic root dilatation is present, measuring 41 mm.  5. No evidence of endocarditis on this study  6. There is no comparison study  available.  ______________________________________________________________________________  Left Ventricle  The left ventricle is normal in size. There is normal left ventricular wall  thickness. Biplane LVEF is 39%. Left ventricular diastolic function is  abnormal. There is mild-moderate global hypokinesia of the left ventricle.     Right Ventricle  Normal right ventricle size and systolic function.     Atria  Normal left atrial size. Right atrial size is normal. There is no color  Doppler evidence of an atrial shunt.     Mitral Valve  Mitral valve leaflets appear normal. There is no evidence of mitral stenosis  or clinically significant mitral regurgitation.     Tricuspid Valve  Tricuspid valve leaflets appear normal. There is no evidence of tricuspid  stenosis or clinically significant tricuspid regurgitation. Right ventricular  systolic pressure could not be approximated due to inadequate tricuspid  regurgitation.     Aortic Valve  The aortic valve is trileaflet. Aortic valve leaflets appear normal. There is  no evidence of aortic stenosis or clinically significant aortic regurgitation.     Pulmonic Valve  The pulmonic valve is not well seen, but is grossly normal. This degree of  valvular regurgitation is within normal limits. There is trace pulmonic  valvular regurgitation.     Vessels  Normal size ascending aorta. Aortic root dilatation is present. IVC diameter  <2.1 cm collapsing >50% with sniff suggests a normal RA pressure of 3 mmHg.     Pericardium  There is no pericardial effusion.     Rhythm  Sinus rhythm was noted.  ______________________________________________________________________________  MMode/2D Measurements & Calculations     RVDd: 3.5 cm  IVSd: 1.1 cm  LVIDd: 5.7 cm  LVIDs: 4.0 cm  LVPWd: 1.1 cm  FS: 29.1 %  LV mass(C)d: 245.5 grams  LV mass(C)dI: 106.6 grams/m2  Ao root diam: 4.1 cm  LA dimension: 3.6 cm  asc Aorta Diam: 3.6 cm  LA/Ao: 0.87  LVOT diam: 2.3 cm  LVOT area: 4.3 cm2  Ao root  diam index Ht(cm/m): 2.2  Ao root diam index BSA (cm/m2): 1.8  Asc Ao diam index BSA (cm/m2): 1.6  Asc Ao diam index Ht(cm/m): 1.9  EF Biplane: 38.6 %     LA Volume Indexed (AL/bp): 15.1 ml/m2  RWT: 0.37  TAPSE: 2.4 cm     Time Measurements  MM HR: 90.0 BPM     Doppler Measurements & Calculations  MV E max fredrick: 58.3 cm/sec  MV A max fredrick: 73.0 cm/sec  MV E/A: 0.80  MV dec time: 0.11 sec  LV V1 max P.7 mmHg  LV V1 max: 82.7 cm/sec  LV V1 VTI: 12.5 cm  SV(LVOT): 53.8 ml  SI(LVOT): 23.4 ml/m2  PA acc time: 0.07 sec  E/E' av.0     Lateral E/e': 13.3  Medial E/e': 8.6  RV S Fredrick: 23.5 cm/sec     ______________________________________________________________________________  Report approved by: Kojo Stephen 2024 02:36 PM             Labs:  Echocardiogram Complete   Final Result        Recent Results (from the past 24 hour(s))   Comprehensive metabolic panel    Collection Time: 24  8:10 PM   Result Value Ref Range    Sodium 130 (L) 135 - 145 mmol/L    Potassium 4.3 3.4 - 5.3 mmol/L    Carbon Dioxide (CO2) 23 22 - 29 mmol/L    Anion Gap 12 7 - 15 mmol/L    Urea Nitrogen 17.0 8.0 - 23.0 mg/dL    Creatinine 1.42 (H) 0.67 - 1.17 mg/dL    GFR Estimate 52 (L) >60 mL/min/1.73m2    Calcium 9.3 8.8 - 10.2 mg/dL    Chloride 95 (L) 98 - 107 mmol/L    Glucose 125 (H) 70 - 99 mg/dL    Alkaline Phosphatase 145 40 - 150 U/L    AST 34 0 - 45 U/L    ALT 31 0 - 70 U/L    Protein Total 7.3 6.4 - 8.3 g/dL    Albumin 3.6 3.5 - 5.2 g/dL    Bilirubin Total 0.6 <=1.2 mg/dL   Lipase    Collection Time: 24  8:10 PM   Result Value Ref Range    Lipase 26 13 - 60 U/L   UA with Microscopic reflex to Culture    Collection Time: 24  8:10 PM    Specimen: Urine, Midstream   Result Value Ref Range    Color Urine Yellow Colorless, Straw, Light Yellow, Yellow    Appearance Urine Turbid (A) Clear    Glucose Urine Negative Negative mg/dL    Bilirubin Urine Negative Negative    Ketones Urine Negative Negative mg/dL     Specific Gravity Urine 1.027 1.001 - 1.030    Blood Urine 0.1 mg/dL (A) Negative    pH Urine 6.0 5.0 - 7.0    Protein Albumin Urine 100 (A) Negative mg/dL    Urobilinogen Urine 2.0 (A) <2.0 mg/dL    Nitrite Urine Negative Negative    Leukocyte Esterase Urine Negative Negative    Mucus Urine Present (A) None Seen /LPF    RBC Urine 3 (H) <=2 /HPF    WBC Urine 4 <=5 /HPF    Squamous Epithelials Urine <1 <=1 /HPF    Hyaline Casts Urine 2 <=2 /LPF   CBC with platelets and differential    Collection Time: 04/25/24  8:10 PM   Result Value Ref Range    WBC Count 13.7 (H) 4.0 - 11.0 10e3/uL    RBC Count 4.69 4.40 - 5.90 10e6/uL    Hemoglobin 14.3 13.3 - 17.7 g/dL    Hematocrit 41.3 40.0 - 53.0 %    MCV 88 78 - 100 fL    MCH 30.5 26.5 - 33.0 pg    MCHC 34.6 31.5 - 36.5 g/dL    RDW 13.4 10.0 - 15.0 %    Platelet Count 201 150 - 450 10e3/uL    % Neutrophils 84 %    % Lymphocytes 4 %    % Monocytes 9 %    % Eosinophils 3 %    % Basophils 0 %    % Immature Granulocytes 0 %    NRBCs per 100 WBC 0 <1 /100    Absolute Neutrophils 11.5 (H) 1.6 - 8.3 10e3/uL    Absolute Lymphocytes 0.5 (L) 0.8 - 5.3 10e3/uL    Absolute Monocytes 1.2 0.0 - 1.3 10e3/uL    Absolute Eosinophils 0.4 0.0 - 0.7 10e3/uL    Absolute Basophils 0.0 0.0 - 0.2 10e3/uL    Absolute Immature Granulocytes 0.0 <=0.4 10e3/uL    Absolute NRBCs 0.0 10e3/uL   Lyme Disease Total Abs Bld with Reflex to Confirm CLIA    Collection Time: 04/25/24  8:10 PM   Result Value Ref Range    Lyme Disease Antibodies Total 0.20 <0.90   Parasite stain    Collection Time: 04/25/24  8:48 PM    Specimen: Peripheral Blood   Result Value Ref Range    Babesia Negative Negative    Anaplasma Negative Negative    Ehrlichia Negative Negative   Blood Culture Peripheral Blood    Collection Time: 04/25/24  8:48 PM    Specimen: Peripheral Blood   Result Value Ref Range    Culture No growth after 12 hours    Blood Culture Peripheral Blood    Collection Time: 04/25/24  8:48 PM    Specimen: Peripheral  Blood   Result Value Ref Range    Culture No growth after 12 hours    Lactic acid whole blood    Collection Time: 04/25/24  8:48 PM   Result Value Ref Range    Lactic Acid 1.6 0.7 - 2.0 mmol/L   Respiratory Panel PCR, Nasopharyngeal    Collection Time: 04/25/24  8:52 PM    Specimen: Nasopharyngeal; Swab   Result Value Ref Range    Adenovirus Invalid (Invalid) Not Detected    Coronavirus Invalid (Invalid) Not Detected    Human Metapneumovirus Invalid (Invalid) Not Detected    Human Rhin/Enterovirus Invalid (Invalid) Not Detected    Influenza A Invalid (Invalid) Not Detected    Influenza A, H1 Invalid (Invalid) Not Detected    Influenza A 2009 H1N1 Invalid (Invalid) Not Detected    Influenza A, H3 Invalid (Invalid) Not Detected    Influenza B Invalid (Invalid) Not Detected    Parainfluenza Virus 1 Invalid (Invalid) Not Detected    Parainfluenza Virus 2 Invalid (Invalid) Not Detected    Parainfluenza Virus 3 Invalid (Invalid) Not Detected    Parainfluenza Virus 4 Invalid (Invalid) Not Detected    Respiratory Syncytial Virus A Invalid (Invalid) Not Detected    Respiratory Syncytial Virus B Invalid (Invalid) Not Detected    Chlamydia Pneumoniae Invalid (Invalid) Not Detected    Mycoplasma Pneumoniae Invalid (Invalid) Not Detected   Comprehensive metabolic panel    Collection Time: 04/26/24  6:30 AM   Result Value Ref Range    Sodium 132 (L) 135 - 145 mmol/L    Potassium 4.4 3.4 - 5.3 mmol/L    Carbon Dioxide (CO2) 20 (L) 22 - 29 mmol/L    Anion Gap 12 7 - 15 mmol/L    Urea Nitrogen 18.1 8.0 - 23.0 mg/dL    Creatinine 1.12 0.67 - 1.17 mg/dL    GFR Estimate 69 >60 mL/min/1.73m2    Calcium 9.0 8.8 - 10.2 mg/dL    Chloride 100 98 - 107 mmol/L    Glucose 114 (H) 70 - 99 mg/dL    Alkaline Phosphatase 128 40 - 150 U/L    AST 27 0 - 45 U/L    ALT 24 0 - 70 U/L    Protein Total 6.4 6.4 - 8.3 g/dL    Albumin 3.2 (L) 3.5 - 5.2 g/dL    Bilirubin Total 0.5 <=1.2 mg/dL   Lipase    Collection Time: 04/26/24  6:30 AM   Result Value Ref  Range    Lipase 20 13 - 60 U/L   CBC with platelets and differential    Collection Time: 04/26/24  6:30 AM   Result Value Ref Range    WBC Count 11.9 (H) 4.0 - 11.0 10e3/uL    RBC Count 4.33 (L) 4.40 - 5.90 10e6/uL    Hemoglobin 13.2 (L) 13.3 - 17.7 g/dL    Hematocrit 38.8 (L) 40.0 - 53.0 %    MCV 90 78 - 100 fL    MCH 30.5 26.5 - 33.0 pg    MCHC 34.0 31.5 - 36.5 g/dL    RDW 13.6 10.0 - 15.0 %    Platelet Count 180 150 - 450 10e3/uL    % Neutrophils 84 %    % Lymphocytes 4 %    % Monocytes 7 %    % Eosinophils 4 %    % Basophils 0 %    % Immature Granulocytes 0 %    NRBCs per 100 WBC 0 <1 /100    Absolute Neutrophils 10.0 (H) 1.6 - 8.3 10e3/uL    Absolute Lymphocytes 0.5 (L) 0.8 - 5.3 10e3/uL    Absolute Monocytes 0.9 0.0 - 1.3 10e3/uL    Absolute Eosinophils 0.4 0.0 - 0.7 10e3/uL    Absolute Basophils 0.0 0.0 - 0.2 10e3/uL    Absolute Immature Granulocytes 0.1 <=0.4 10e3/uL    Absolute NRBCs 0.0 10e3/uL   Procalcitonin    Collection Time: 04/26/24  6:30 AM   Result Value Ref Range    Procalcitonin 0.99 (H) <0.50 ng/mL   Respiratory Panel PCR, Nasopharyngeal    Collection Time: 04/26/24  8:30 AM    Specimen: Nasopharyngeal; Swab   Result Value Ref Range    Adenovirus Not Detected Not Detected    Coronavirus Not Detected Not Detected    Human Metapneumovirus Not Detected Not Detected    Human Rhin/Enterovirus Not Detected Not Detected    Influenza A Not Detected Not Detected    Influenza A, H1 Not Detected Not Detected    Influenza A 2009 H1N1 Not Detected Not Detected    Influenza A, H3 Not Detected Not Detected    Influenza B Not Detected Not Detected    Parainfluenza Virus 1 Not Detected Not Detected    Parainfluenza Virus 2 Not Detected Not Detected    Parainfluenza Virus 3 Not Detected Not Detected    Parainfluenza Virus 4 Not Detected Not Detected    Respiratory Syncytial Virus A Not Detected Not Detected    Respiratory Syncytial Virus B Not Detected Not Detected    Chlamydia Pneumoniae Not Detected Not  Detected    Mycoplasma Pneumoniae Not Detected Not Detected   Echocardiogram Complete    Collection Time: 04/26/24  1:56 PM   Result Value Ref Range    Biplane LVEF 39%        Pending Labs:  Unresulted Labs Ordered in the Past 30 Days of this Admission       Date and Time Order Name Status Description    4/26/2024 10:51 AM PSA tumor marker In process     4/25/2024  9:40 PM Rickettsia rickettsii antibody IgG & IgM In process     4/25/2024  8:34 PM Blood Culture Peripheral Blood Preliminary     4/25/2024  8:34 PM Blood Culture Peripheral Blood Preliminary     4/25/2024  8:34 PM Ehrlichia Anaplasma Sp by PCR In process     4/25/2024  8:34 PM Babesia Species by PCR In process     4/24/2024 12:54 PM Blood Culture Peripheral Blood Preliminary     4/24/2024 12:54 PM Blood Culture Peripheral Blood Preliminary               STEPHAN HOWE MD  [facility]  Phone: #[phone]    Securely message me with Erasto (more info)

## 2024-04-26 NOTE — MEDICATION SCRIBE - ADMISSION MEDICATION HISTORY
"Medication Scribe Admission Medication History    Admission medication history is complete. The information provided in this note is only as accurate as the sources available at the time of the update.    Information Source(s): Patient and CareEverywhere/SureScripts via in-person    Pertinent Information: Patient has no Rx medications at this time. Reports taking 500 mg of acetaminophen approximately at 1600. Reports taking \"some sort of stool softener that said fiber on it\" earlier today, but neither the patient or spouse of the patient knew what it was. No other medications have been taken other than these since discharge yesterday.    Changes made to PTA medication list:  Added:   Acetaminophen 500 mg  Deleted: None  Changed: None    Allergies reviewed with patient and updates made in EHR: yes    Medication History Completed By: Tiago Waggoner 4/25/2024 8:41 PM    PTA Med List   Medication Sig Last Dose    acetaminophen (TYLENOL) 500 MG tablet Take 500-1,000 mg by mouth every 6 hours as needed for mild pain or fever 4/25/2024 at 1600; 500 mg    vitamin D3 (CHOLECALCIFEROL) 50 mcg (2000 units) tablet Take 1 tablet by mouth daily as needed 4/24/2024 at AM       "

## 2024-04-26 NOTE — PROGRESS NOTES
"PRIMARY DIAGNOSIS: \"GENERIC\" NURSING  OUTPATIENT/OBSERVATION GOALS TO BE MET BEFORE DISCHARGE:  ADLs back to baseline: No    Activity and level of assistance: Ambulating independently.    Pain status: Pain free.    Return to near baseline physical activity: No    Pt oriented x4. Endorsing feeling some \"brain fog,\" overall feeling very unwell overnight. VSS, highest temp at 100.0, controlled with tylenol. TUMs provided for upset stomach. IV fluids continued.   "

## 2024-04-27 LAB
ANION GAP SERPL CALCULATED.3IONS-SCNC: 9 MMOL/L (ref 7–15)
BASOPHILS # BLD AUTO: 0 10E3/UL (ref 0–0.2)
BASOPHILS NFR BLD AUTO: 0 %
BUN SERPL-MCNC: 18.5 MG/DL (ref 8–23)
CALCIUM SERPL-MCNC: 8.7 MG/DL (ref 8.8–10.2)
CHLORIDE SERPL-SCNC: 99 MMOL/L (ref 98–107)
CHOLEST SERPL-MCNC: 114 MG/DL
CREAT SERPL-MCNC: 1.08 MG/DL (ref 0.67–1.17)
DEPRECATED HCO3 PLAS-SCNC: 25 MMOL/L (ref 22–29)
EGFRCR SERPLBLD CKD-EPI 2021: 72 ML/MIN/1.73M2
EOSINOPHIL # BLD AUTO: 0.6 10E3/UL (ref 0–0.7)
EOSINOPHIL NFR BLD AUTO: 5 %
ERYTHROCYTE [DISTWIDTH] IN BLOOD BY AUTOMATED COUNT: 13.9 % (ref 10–15)
GLUCOSE SERPL-MCNC: 111 MG/DL (ref 70–99)
HAV IGM SERPL QL IA: NONREACTIVE
HBA1C MFR BLD: 5.8 %
HBV CORE IGM SERPL QL IA: NONREACTIVE
HBV SURFACE AG SERPL QL IA: NONREACTIVE
HCT VFR BLD AUTO: 38.9 % (ref 40–53)
HCV AB SERPL QL IA: NONREACTIVE
HDLC SERPL-MCNC: 21 MG/DL
HGB BLD-MCNC: 13.4 G/DL (ref 13.3–17.7)
HIV 1+2 AB+HIV1 P24 AG SERPL QL IA: NONREACTIVE
HOLD SPECIMEN: NORMAL
IMM GRANULOCYTES # BLD: 0.1 10E3/UL
IMM GRANULOCYTES NFR BLD: 1 %
LDLC SERPL CALC-MCNC: 64 MG/DL
LYMPHOCYTES # BLD AUTO: 0.7 10E3/UL (ref 0.8–5.3)
LYMPHOCYTES NFR BLD AUTO: 6 %
MAGNESIUM SERPL-MCNC: 2.2 MG/DL (ref 1.7–2.3)
MCH RBC QN AUTO: 30.1 PG (ref 26.5–33)
MCHC RBC AUTO-ENTMCNC: 34.4 G/DL (ref 31.5–36.5)
MCV RBC AUTO: 87 FL (ref 78–100)
MONOCYTES # BLD AUTO: 1 10E3/UL (ref 0–1.3)
MONOCYTES NFR BLD AUTO: 9 %
NEUTROPHILS # BLD AUTO: 9.2 10E3/UL (ref 1.6–8.3)
NEUTROPHILS NFR BLD AUTO: 79 %
NONHDLC SERPL-MCNC: 93 MG/DL
NRBC # BLD AUTO: 0 10E3/UL
NRBC BLD AUTO-RTO: 0 /100
PLATELET # BLD AUTO: 212 10E3/UL (ref 150–450)
POTASSIUM SERPL-SCNC: 4.4 MMOL/L (ref 3.4–5.3)
RBC # BLD AUTO: 4.45 10E6/UL (ref 4.4–5.9)
SODIUM SERPL-SCNC: 133 MMOL/L (ref 135–145)
TRIGL SERPL-MCNC: 144 MG/DL
TROPONIN T SERPL HS-MCNC: 254 NG/L
TROPONIN T SERPL HS-MCNC: 272 NG/L
TROPONIN T SERPL HS-MCNC: 325 NG/L
TROPONIN T SERPL HS-MCNC: 363 NG/L
TSH SERPL DL<=0.005 MIU/L-ACNC: 1.12 UIU/ML (ref 0.3–4.2)
UFH PPP CHRO-ACNC: 0.23 IU/ML
WBC # BLD AUTO: 11.6 10E3/UL (ref 4–11)

## 2024-04-27 PROCEDURE — 80074 ACUTE HEPATITIS PANEL: CPT | Performed by: STUDENT IN AN ORGANIZED HEALTH CARE EDUCATION/TRAINING PROGRAM

## 2024-04-27 PROCEDURE — 83036 HEMOGLOBIN GLYCOSYLATED A1C: CPT | Performed by: STUDENT IN AN ORGANIZED HEALTH CARE EDUCATION/TRAINING PROGRAM

## 2024-04-27 PROCEDURE — 93005 ELECTROCARDIOGRAM TRACING: CPT | Performed by: INTERNAL MEDICINE

## 2024-04-27 PROCEDURE — 250N000011 HC RX IP 250 OP 636: Performed by: STUDENT IN AN ORGANIZED HEALTH CARE EDUCATION/TRAINING PROGRAM

## 2024-04-27 PROCEDURE — 250N000011 HC RX IP 250 OP 636: Performed by: HOSPITALIST

## 2024-04-27 PROCEDURE — 250N000011 HC RX IP 250 OP 636: Performed by: INTERNAL MEDICINE

## 2024-04-27 PROCEDURE — 99233 SBSQ HOSP IP/OBS HIGH 50: CPT | Performed by: STUDENT IN AN ORGANIZED HEALTH CARE EDUCATION/TRAINING PROGRAM

## 2024-04-27 PROCEDURE — 86658 ENTEROVIRUS ANTIBODY: CPT | Performed by: STUDENT IN AN ORGANIZED HEALTH CARE EDUCATION/TRAINING PROGRAM

## 2024-04-27 PROCEDURE — 36415 COLL VENOUS BLD VENIPUNCTURE: CPT | Performed by: STUDENT IN AN ORGANIZED HEALTH CARE EDUCATION/TRAINING PROGRAM

## 2024-04-27 PROCEDURE — 99222 1ST HOSP IP/OBS MODERATE 55: CPT | Performed by: INTERNAL MEDICINE

## 2024-04-27 PROCEDURE — G0378 HOSPITAL OBSERVATION PER HR: HCPCS

## 2024-04-27 PROCEDURE — 96376 TX/PRO/DX INJ SAME DRUG ADON: CPT

## 2024-04-27 PROCEDURE — 84484 ASSAY OF TROPONIN QUANT: CPT | Performed by: STUDENT IN AN ORGANIZED HEALTH CARE EDUCATION/TRAINING PROGRAM

## 2024-04-27 PROCEDURE — 82552 ASSAY OF CPK IN BLOOD: CPT | Performed by: STUDENT IN AN ORGANIZED HEALTH CARE EDUCATION/TRAINING PROGRAM

## 2024-04-27 PROCEDURE — 87389 HIV-1 AG W/HIV-1&-2 AB AG IA: CPT | Performed by: STUDENT IN AN ORGANIZED HEALTH CARE EDUCATION/TRAINING PROGRAM

## 2024-04-27 PROCEDURE — 93005 ELECTROCARDIOGRAM TRACING: CPT

## 2024-04-27 PROCEDURE — 210N000002 HC R&B HEART CARE

## 2024-04-27 PROCEDURE — 250N000013 HC RX MED GY IP 250 OP 250 PS 637: Performed by: INTERNAL MEDICINE

## 2024-04-27 PROCEDURE — 250N000013 HC RX MED GY IP 250 OP 250 PS 637: Performed by: HOSPITALIST

## 2024-04-27 PROCEDURE — 80048 BASIC METABOLIC PNL TOTAL CA: CPT | Performed by: STUDENT IN AN ORGANIZED HEALTH CARE EDUCATION/TRAINING PROGRAM

## 2024-04-27 PROCEDURE — 85520 HEPARIN ASSAY: CPT | Performed by: STUDENT IN AN ORGANIZED HEALTH CARE EDUCATION/TRAINING PROGRAM

## 2024-04-27 PROCEDURE — 83735 ASSAY OF MAGNESIUM: CPT | Performed by: STUDENT IN AN ORGANIZED HEALTH CARE EDUCATION/TRAINING PROGRAM

## 2024-04-27 PROCEDURE — 96361 HYDRATE IV INFUSION ADD-ON: CPT

## 2024-04-27 PROCEDURE — 80061 LIPID PANEL: CPT | Performed by: STUDENT IN AN ORGANIZED HEALTH CARE EDUCATION/TRAINING PROGRAM

## 2024-04-27 PROCEDURE — 84443 ASSAY THYROID STIM HORMONE: CPT | Performed by: STUDENT IN AN ORGANIZED HEALTH CARE EDUCATION/TRAINING PROGRAM

## 2024-04-27 PROCEDURE — 85025 COMPLETE CBC W/AUTO DIFF WBC: CPT | Performed by: STUDENT IN AN ORGANIZED HEALTH CARE EDUCATION/TRAINING PROGRAM

## 2024-04-27 PROCEDURE — 99232 SBSQ HOSP IP/OBS MODERATE 35: CPT | Performed by: STUDENT IN AN ORGANIZED HEALTH CARE EDUCATION/TRAINING PROGRAM

## 2024-04-27 PROCEDURE — 93010 ELECTROCARDIOGRAM REPORT: CPT | Performed by: INTERNAL MEDICINE

## 2024-04-27 RX ORDER — METOPROLOL TARTRATE 50 MG
50 TABLET ORAL EVERY 6 HOURS
Status: DISCONTINUED | OUTPATIENT
Start: 2024-04-27 | End: 2024-04-28

## 2024-04-27 RX ORDER — HEPARIN SODIUM 10000 [USP'U]/100ML
0-5000 INJECTION, SOLUTION INTRAVENOUS CONTINUOUS
Status: DISCONTINUED | OUTPATIENT
Start: 2024-04-27 | End: 2024-04-27

## 2024-04-27 RX ORDER — METOPROLOL TARTRATE 25 MG/1
25 TABLET, FILM COATED ORAL ONCE
Status: COMPLETED | OUTPATIENT
Start: 2024-04-27 | End: 2024-04-27

## 2024-04-27 RX ORDER — HEPARIN SODIUM 10000 [USP'U]/100ML
0-5000 INJECTION, SOLUTION INTRAVENOUS CONTINUOUS
Status: DISPENSED | OUTPATIENT
Start: 2024-04-27 | End: 2024-04-28

## 2024-04-27 RX ORDER — DILTIAZEM HYDROCHLORIDE 5 MG/ML
25 INJECTION INTRAVENOUS ONCE
Status: COMPLETED | OUTPATIENT
Start: 2024-04-27 | End: 2024-04-27

## 2024-04-27 RX ORDER — METOPROLOL TARTRATE 25 MG/1
25 TABLET, FILM COATED ORAL EVERY 6 HOURS
Status: DISCONTINUED | OUTPATIENT
Start: 2024-04-27 | End: 2024-04-27

## 2024-04-27 RX ADMIN — ACETAMINOPHEN 975 MG: 325 TABLET ORAL at 20:11

## 2024-04-27 RX ADMIN — METOPROLOL TARTRATE 50 MG: 50 TABLET, FILM COATED ORAL at 20:10

## 2024-04-27 RX ADMIN — HEPARIN SODIUM 1200 UNITS/HR: 10000 INJECTION, SOLUTION INTRAVENOUS at 14:39

## 2024-04-27 RX ADMIN — DILTIAZEM HYDROCHLORIDE 25 MG: 5 INJECTION INTRAVENOUS at 17:17

## 2024-04-27 RX ADMIN — DOXYCYCLINE 100 MG: 100 INJECTION, POWDER, LYOPHILIZED, FOR SOLUTION INTRAVENOUS at 22:37

## 2024-04-27 RX ADMIN — METOPROLOL TARTRATE 25 MG: 25 TABLET, FILM COATED ORAL at 15:58

## 2024-04-27 RX ADMIN — DOXYCYCLINE 100 MG: 100 INJECTION, POWDER, LYOPHILIZED, FOR SOLUTION INTRAVENOUS at 09:08

## 2024-04-27 RX ADMIN — CEFTRIAXONE SODIUM 2 G: 2 INJECTION, POWDER, FOR SOLUTION INTRAMUSCULAR; INTRAVENOUS at 11:06

## 2024-04-27 RX ADMIN — METOPROLOL TARTRATE 25 MG: 25 TABLET, FILM COATED ORAL at 11:06

## 2024-04-27 ASSESSMENT — ACTIVITIES OF DAILY LIVING (ADL)
ADLS_ACUITY_SCORE: 22
FALL_HISTORY_WITHIN_LAST_SIX_MONTHS: NO
CONCENTRATING,_REMEMBERING_OR_MAKING_DECISIONS_DIFFICULTY: NO
DOING_ERRANDS_INDEPENDENTLY_DIFFICULTY: NO
ADLS_ACUITY_SCORE: 30
ADLS_ACUITY_SCORE: 30
DRESSING/BATHING_DIFFICULTY: NO
ADLS_ACUITY_SCORE: 30
TOILETING_ISSUES: NO
HEARING_DIFFICULTY_OR_DEAF: NO
CHANGE_IN_FUNCTIONAL_STATUS_SINCE_ONSET_OF_CURRENT_ILLNESS/INJURY: NO
ADLS_ACUITY_SCORE: 30
ADLS_ACUITY_SCORE: 22
WEAR_GLASSES_OR_BLIND: YES
ADLS_ACUITY_SCORE: 22
DIFFICULTY_COMMUNICATING: NO
ADLS_ACUITY_SCORE: 30
ADLS_ACUITY_SCORE: 22
ADLS_ACUITY_SCORE: 30
ADLS_ACUITY_SCORE: 22
DIFFICULTY_EATING/SWALLOWING: NO
ADLS_ACUITY_SCORE: 30
WALKING_OR_CLIMBING_STAIRS_DIFFICULTY: NO
ADLS_ACUITY_SCORE: 30
ADLS_ACUITY_SCORE: 22
ADLS_ACUITY_SCORE: 30
ADLS_ACUITY_SCORE: 30

## 2024-04-27 NOTE — UTILIZATION REVIEW
Admission Status; Secondary Review Determination   Under the authority of the Utilization Management Committee, the utilization review process indicated a secondary review on the above patient. The review outcome is based on review of the medical records, discussions with staff, and applying clinical experience noted on the date of the review.     (x) Inpatient Status Appropriate - This patient's medical care is consistent with medical management for inpatient care and reasonable inpatient medical practice.     RATIONALE FOR DETERMINATION   Mr. Perez  is a 72 yo  male pt who presents to the ED with weakness, N/V and chills  while out of town.  Found to have  hyponatremia and leukocytosis.  Continues on IV doxycycline bid and IV rocephin for possible kia mountain spotted fever.  ECHO with evidence of new CM and decrease in systolic cardiac  function.   Troponin is mildly elevated; EKG now with a fib with RVR.  Cardiology consulted; starting IV heparin this am and scheduled metoprolol q6h.   Serial troponins requested by cardiology q6h.  He is requiring further medical evaluation, treatment and close clinical  monitoring today.  At the time of admission with the information available to the attending physician more than 2 nights Hospital complex care was anticipated, based on patient risk of adverse outcome if treated as outpatient and complex care required. Inpatient admission is appropriate based on the Medicare guidelines.   The information on this document is developed by the utilization review team in order for the business office to ensure compliance. This only denotes the appropriateness of proper admission status and does not reflect the quality of care rendered.   The definitions of Inpatient Status and Observation Status used in making the determination above are those provided in the CMS Coverage Manual, Chapter 1 and Chapter 6, section 70.4.     Sincerely,     Morena Thomason, DO  Utilization  Review  Physician Advisor

## 2024-04-27 NOTE — CONSULTS
[facility] team is asked to see Omar Perez in consultation to evaluate fever of unknown origin, new low EF .      Assessment:     New cardiomyopathy in a physically active 73-year-old gentleman, associated with acute onset of febrile illness less than 1 week ago.  No recent procedures, on no medications.  Irregularly irregular rhythm on examination is suspicious for atrial fibrillation.  This could cause a tachycardia mediated cardiomyopathy.  [id number] score of 1.  Other possibilities include viral myocarditis, or even secondary cardiomyopathy related to sepsis syndrome.     Plan:     Stat EKG  Telemetry monitoring  Serial troponins  Metoprolol 25 mg twice daily, increasing as needed to control ventricular response.  Further recommendations to follow.  Initiation of anticoagulation with heparin drip     Current History:     Omar Perez is a 73-year-old gentleman with no history of hypertension or diabetes mellitus.  He does have a history of mildly increased ascending aorta with calcium score of 3 couple of years ago.  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  He last completed this 1 week ago.  In the interim he was visiting a daughter out in [place], he was working outside in the cold and the following day he felt poorly with fevers chills sweats but no nausea vomiting or diarrhea. There was no chest pain, cough or sputum production or dysuria.  His symptoms waxed and waned over the next couple of days, before a fever of 103 brought him to the hospital few days ago.  Echocardiogram yesterday showed an LVEF of 35 to 40%, with no significant valvular abnormalities noted.  Troponin this morning is elevated at 344.    He recalls being treated for suspected tick related disease 20 years ago with doxycycline.  This was administered empirically by his recollection with no diagnostic testing performed.  He  "frequently goes out camping but has not done so recently.  He has no history of coronary artery disease.      Past Medical History:   History reviewed. No pertinent past medical history.  Sleep:     Past Surgical History:     Past Surgical History:   Procedure Laterality Date    HERNIA REPAIR         Family History:     Family History   Problem Relation Age of Onset    Prostate Cancer Father     No Known Problems Daughter     Alcoholism Daughter      Family history reviewed and is not pertinent to the chief complaint or presenting problem    Social History:    reports that he has never smoked. He has never used smokeless tobacco. He reports current alcohol use of about 2.0 standard drinks of alcohol per week. He reports that he does not use drugs.  Exercise history: Swims a mile and a half 3 days a week.  Last done 1 week ago, feeling fine    Meds:     No current outpatient medications on file.       Allergies:   Patient has no known allergies.    Review of Systems:   A 12 point comprehensive review of systems was negative except as noted in the current history.    Objective:      Physical Exam  Wt Readings from Last 3 Encounters:   04/25/24 101.8 kg (224 lb 6.4 oz)   04/24/24 101.6 kg (224 lb)   04/24/24 102.5 kg (226 lb)     Body mass index is 28.05 kg/m .  /83 (BP Location: Left arm, Patient Position: Semi-Mario's)   Pulse 98   Temp 98.6  F (37  C) (Oral)   Resp 24   Ht 1.905 m (6' 3\")   Wt 101.8 kg (224 lb 6.4 oz)   SpO2 94%   BMI 28.05 kg/m      General Appearance: Toxic appearing, anxious, tachypneic  HEENT: No scleral icterus; the mucous membranes were pink and moist.  Conjunctiva not injected  Neck:  No cervical bruits, jugular venous distention, or thyromegaly.  Chest:The spine was straight. The chest was symmetric.  Lungs:  Respirations unlabored; the lungs are clear to auscultation.  Cardiovascular:     Normal point of maximal impulse. Auscultation reveals rapid, irregularly irregular first " and second heart sounds with no murmurs, rubs, or gallops. Carotid, radial, and dorsalis pedal pulses are intact and symmetric.  Abdomen:  No organomegaly, masses, bruits, or tenderness. Bowels sounds are present  Extremities: No cyanosis, clubbing, or edema.  Skin:  No xanthelasma.  Neurologic: Mood and affect are appropriate. Speech is fluent.      EKG (personally reviewed): 4/24/2024: Sinus rhythm 76 bpm.  Left axis deviation.  Today: Atrial fibrillation at 137 bpm.    Imaging   CT chest PE run 4/24/2024:  CORONARY ARTERY CALCIFICATION: Mild.  1.  Negative for pulmonary emboli and negative for dissections.  2.  No significant findings in the lungs.  3.  Mild generalized cardiac enlargement.  4.  Stable 4.1 cm mild dilatation ascending thoracic aorta.      Cardiac diagnostics    Echocardiogram 4/26/2024: Personally reviewed: Normal sinus rhythm.    1. Biplane LVEF is 39%. There is mild-moderate global hypokinesia of the left ventricle.  2. Normal right ventricle size and systolic function.  3. No hemodynamically significant valvular abnormalities on 2D or color flow  imaging.  4. Aortic root dilatation is present, measuring 41 mm.  5. No evidence of endocarditis on this study  6. There is no comparison study available.    CT coronary calcium score 2022:  A calcium score =3. places the individual in the lowest 25th percentile when compared to an age and gender matched control group and implies a low risk of cardiac events in the next ten years (less than 1% per year).  Mildly dilated mid ascending aorta 42 mm.       Lab Results    Chemistry/lipid CBC Cardiac Enzymes/BNP/TSH/INR   Recent Labs   Lab Test 01/05/22  1655   CHOL 163   HDL 45   LDL 90   TRIG 141     Recent Labs   Lab Test 01/05/22  1655 09/10/20  1348   LDL 90 112     Recent Labs   Lab Test 04/27/24  0705   *   POTASSIUM 4.4   CHLORIDE 99   CO2 25   *   BUN 18.5   CR 1.08   GFRESTIMATED 72   YO 8.7*     Recent Labs   Lab Test 04/27/24  0705  "04/26/24  0630 04/25/24 2010   CR 1.08 1.12 1.42*     Recent Labs   Lab Test 04/27/24  0705   A1C 5.8*          Recent Labs   Lab Test 04/27/24  0705   WBC 11.6*   HGB 13.4   HCT 38.9*   MCV 87        Recent Labs   Lab Test 04/27/24  0705 04/26/24  0630 04/25/24 2010   HGB 13.4 13.2* 14.3    No results for input(s): \"TROPONINI\" in the last 74789 hours.  No results for input(s): \"BNP\", \"NTBNPI\", \"NTBNP\" in the last 88965 hours.  Recent Labs   Lab Test 04/27/24  0705   TSH 1.12     No results for input(s): \"INR\" in the last 23890 hours.         Rah Agosto MD  [facility]  4/27/2024    Note created using Dragon voice recognition software.  Sound alike errors may have escaped editing.    Clinically Significant Risk Factors Present on Admission             # Hypoalbuminemia: Lowest albumin = 3.2 g/dL at 4/26/2024  6:30 AM, will monitor as appropriate      # Overweight: Estimated body mass index is 28.05 kg/m  as calculated from the following:    Height as of this encounter: 1.905 m (6' 3\").    Weight as of this encounter: 101.8 kg (224 lb 6.4 oz).                                  "

## 2024-04-27 NOTE — PROGRESS NOTES
[facility] MEDICINE  PROGRESS NOTE       Securely message me with Erasto (more info)    Code Status: Full Code       Identification/Summary:   Omar Perez is a 73 year old male with PMHx significant for dilated thoracic aorta, BPH who was admitted on 4/25/2024. He presented with generalized weakness, nausea, and fevers.  Test for atypical infection worsened.  Infectious disease were consulted.  Empiric doxycycline and ceftriaxone were started. Found to have new low EF of 39% and troponin of 363. Cardiology consulted.        Barriers to Discharge: Fever of unknown origin under discovery, Afib with RVR, possible myocarditis     Disposition: observation     Assessment and Plan:  Acute febrile illness and rash  Presenting with intermittent fevers up to 103 F, persistent nausea without vomiting, and generalized weakness over the past week with grossly negative workup and unclear etiology. Papular rash over his back and chest abdomen.  Pro-Reinaldo is 0.99.   - s/p IV F  -4/25 start doxycycline 100 mg IV q12h to cover for potential tickborne illness, ID added ceftriaxone on 4/26  - Infectious Disease consult  - Follow pending blood tests (Ehrlichia, Lyme -, Babesia, respiratory panel), added Rickettsia IgG and IgM, ID added TTE, PSA. Added HIV, hepatitis panel  - Follow blood cultures    Afib with RVR  Troponin elevation  New on 4/27. Possible viral myocarditis.  TSH is normal.  LDL is pending.  Hemoglobin A1c 5.8.  - continue monitor trop  -Started on metoprolol 25 twice daily  -Heparin drip per cardiology  - Check Magnesium    Acute kidney injury -improving  Dehydration  Cr 1.42 on arrival, down slightly from 1.50 the day prior. Likely due to dehydration and poor oral intake.  - IV fluids as above, stopped  - Avoid nephrotoxic agents     Dilation of ascending thoracic aorta, chronic  Stable at 4.1 cm per CT Chest from 4/24/2024.     Benign prostatic hyperplasia  Not on any medications  "for this. No new urinary symptoms.     Urine protein  Follow-up as an outpatient     Poor appetite  Ensure added    Hyponatremia  Related to infection?  Low p.o. intake?  Monitor    Anticoagulation   Orders (Includes Only Anticoagulants)  heparin, 0-5,000 Units/hr, Intravenous, Continuous  heparin ANTICOAGULANT Loading dose, 6,000 Units, Intravenous, Once      Therapy: None  Ventura:Not present  Lines: None       Current Diet  Orders Placed This Encounter      Regular Diet Adult        Clinically Significant Risk Factors Present on Admission              # Hypoalbuminemia: Lowest albumin = 3.2 g/dL at 4/26/2024  6:30 AM, will monitor as appropriate          # Overweight: Estimated body mass index is 28.05 kg/m  as calculated from the following:    Height as of this encounter: 1.905 m (6' 3\").    Weight as of this encounter: 101.8 kg (224 lb 6.4 oz).              Interval History/Subjective:  Patient still has no improvement in his symptoms.  Rashes the same.  Still low appetite.  No other specific complaint but lack of energy.  He thought his not cannot make it.      Last 24H PRN:     calcium carbonate (TUMS) chewable tablet 1,000 mg, 1,000 mg at 04/26/24 2239    melatonin tablet 1 mg, 1 mg at 04/26/24 2239    Physical Exam/Objective:  Temp:  [97.5  F (36.4  C)-98.6  F (37  C)] 97.5  F (36.4  C)  Pulse:  [92-99] 99  Resp:  [18-24] 20  BP: (117-132)/(69-83) 117/69  SpO2:  [94 %-98 %] 98 %  Wt Readings from Last 4 Encounters:   04/25/24 101.8 kg (224 lb 6.4 oz)   04/24/24 101.6 kg (224 lb)   04/24/24 102.5 kg (226 lb)   04/11/24 103.4 kg (228 lb)     Body mass index is 28.05 kg/m .    General Appearance: Alert and wake, not in distress  Cardiovascular: Irregular, tachycardiac  Neurology: oriented x 3  Psych: cooperative and calm, normal affect  Skin: Papular rash on the back more than abdomen and chest    Medications:   Personally Reviewed.  Medications   Current Facility-Administered Medications   Medication Dose Route " Frequency Provider Last Rate Last Admin    heparin 25,000 units in 0.45% NaCl 250 mL ANTICOAGULANT infusion  0-5,000 Units/hr Intravenous Continuous Lamar Gorman MD         Current Facility-Administered Medications   Medication Dose Route Frequency Provider Last Rate Last Admin    cefTRIAXone (ROCEPHIN) 2 g vial to attach to  ml bag for ADULTS or NS 50 ml bag for PEDS  2 g Intravenous Q24H Subhash Neely MD   2 g at 24 1106    doxycycline (VIBRAMYCIN) 100 mg vial to attach to  mL bag  100 mg Intravenous Q12H Tristian Pereira DO   100 mg at 24 0908    heparin ANTICOAGULANT loading dose for  LOW INTENSITY TREATMENT* Give BEFORE starting heparin infusion  6,000 Units Intravenous Once Lamar Gorman MD        metoprolol tartrate (LOPRESSOR) tablet 25 mg  25 mg Oral Q6H Rah Agosto MD   25 mg at 24 1106    ondansetron (ZOFRAN) injection 4 mg  4 mg Intravenous Once Breanne Quintero MD           Data reviewed today: I personally reviewed all new medications, labs, imaging/diagnostics reports over the past 24 hours. Pertinent findings include:    Imaging:   Recent Results (from the past 24 hour(s))   Echocardiogram Complete   Result Value    Biplane LVEF 39%    Narrative    [id number]  [id number]  [id number]^CHIN^SUBHASH^[place] [address]     Name: HARDIK CASTRO  MRN: [id number]  : 1950  Study Date: 2024 01:21 PM  Age: 73 yrs  Gender: Male  Patient Location: [facility]  Reason For Study: Endocarditis  Ordering Physician: SUBHASH NEELY  Performed By: CLYDE     BSA: 2.3 m2  Height: 75 in  Weight: 224 lb  HR: 90  BP: 112/68 mmHg  ______________________________________________________________________________  Procedure  Complete Portable Echo Adult. Definity (NDC #76294-125) given intravenously.  Adequate quality two-dimensional was performed and interpreted. No  hemodynamically significant valvular abnormalities on 2D or  color flow  imaging. There is no comparison study available.  ______________________________________________________________________________  Interpretation Summary     1. Biplane LVEF is 39%. There is mild-moderate global hypokinesia of the left  ventricle.  2. Normal right ventricle size and systolic function.  3. No hemodynamically significant valvular abnormalities on 2D or color flow  imaging.  4. Aortic root dilatation is present, measuring 41 mm.  5. No evidence of endocarditis on this study  6. There is no comparison study available.  ______________________________________________________________________________  Left Ventricle  The left ventricle is normal in size. There is normal left ventricular wall  thickness. Biplane LVEF is 39%. Left ventricular diastolic function is  abnormal. There is mild-moderate global hypokinesia of the left ventricle.     Right Ventricle  Normal right ventricle size and systolic function.     Atria  Normal left atrial size. Right atrial size is normal. There is no color  Doppler evidence of an atrial shunt.     Mitral Valve  Mitral valve leaflets appear normal. There is no evidence of mitral stenosis  or clinically significant mitral regurgitation.     Tricuspid Valve  Tricuspid valve leaflets appear normal. There is no evidence of tricuspid  stenosis or clinically significant tricuspid regurgitation. Right ventricular  systolic pressure could not be approximated due to inadequate tricuspid  regurgitation.     Aortic Valve  The aortic valve is trileaflet. Aortic valve leaflets appear normal. There is  no evidence of aortic stenosis or clinically significant aortic regurgitation.     Pulmonic Valve  The pulmonic valve is not well seen, but is grossly normal. This degree of  valvular regurgitation is within normal limits. There is trace pulmonic  valvular regurgitation.     Vessels  Normal size ascending aorta. Aortic root dilatation is present. IVC diameter  <2.1 cm collapsing  >50% with sniff suggests a normal RA pressure of 3 mmHg.     Pericardium  There is no pericardial effusion.     Rhythm  Sinus rhythm was noted.  ______________________________________________________________________________  MMode/2D Measurements & Calculations     RVDd: 3.5 cm  IVSd: 1.1 cm  LVIDd: 5.7 cm  LVIDs: 4.0 cm  LVPWd: 1.1 cm  FS: 29.1 %  LV mass(C)d: 245.5 grams  LV mass(C)dI: 106.6 grams/m2  Ao root diam: 4.1 cm  LA dimension: 3.6 cm  asc Aorta Diam: 3.6 cm  LA/Ao: 0.87  LVOT diam: 2.3 cm  LVOT area: 4.3 cm2  Ao root diam index Ht(cm/m): 2.2  Ao root diam index BSA (cm/m2): 1.8  Asc Ao diam index BSA (cm/m2): 1.6  Asc Ao diam index Ht(cm/m): 1.9  EF Biplane: 38.6 %     LA Volume Indexed (AL/bp): 15.1 ml/m2  RWT: 0.37  TAPSE: 2.4 cm     Time Measurements  MM HR: 90.0 BPM     Doppler Measurements & Calculations  MV E max fredrick: 58.3 cm/sec  MV A max fredrick: 73.0 cm/sec  MV E/A: 0.80  MV dec time: 0.11 sec  LV V1 max P.7 mmHg  LV V1 max: 82.7 cm/sec  LV V1 VTI: 12.5 cm  SV(LVOT): 53.8 ml  SI(LVOT): 23.4 ml/m2  PA acc time: 0.07 sec  E/E' av.0     Lateral E/e': 13.3  Medial E/e': 8.6  RV S Fredrick: 23.5 cm/sec     ______________________________________________________________________________  Report approved by: Kojo Stephen 2024 02:36 PM             Labs:  Echocardiogram Complete   Final Result        Recent Results (from the past 24 hour(s))   Echocardiogram Complete    Collection Time: 24  1:56 PM   Result Value Ref Range    Biplane LVEF 39%    Basic metabolic panel    Collection Time: 24  7:05 AM   Result Value Ref Range    Sodium 133 (L) 135 - 145 mmol/L    Potassium 4.4 3.4 - 5.3 mmol/L    Chloride 99 98 - 107 mmol/L    Carbon Dioxide (CO2) 25 22 - 29 mmol/L    Anion Gap 9 7 - 15 mmol/L    Urea Nitrogen 18.5 8.0 - 23.0 mg/dL    Creatinine 1.08 0.67 - 1.17 mg/dL    GFR Estimate 72 >60 mL/min/1.73m2    Calcium 8.7 (L) 8.8 - 10.2 mg/dL    Glucose 111 (H) 70 - 99 mg/dL   CBC with  platelets and differential    Collection Time: 04/27/24  7:05 AM   Result Value Ref Range    WBC Count 11.6 (H) 4.0 - 11.0 10e3/uL    RBC Count 4.45 4.40 - 5.90 10e6/uL    Hemoglobin 13.4 13.3 - 17.7 g/dL    Hematocrit 38.9 (L) 40.0 - 53.0 %    MCV 87 78 - 100 fL    MCH 30.1 26.5 - 33.0 pg    MCHC 34.4 31.5 - 36.5 g/dL    RDW 13.9 10.0 - 15.0 %    Platelet Count 212 150 - 450 10e3/uL    % Neutrophils 79 %    % Lymphocytes 6 %    % Monocytes 9 %    % Eosinophils 5 %    % Basophils 0 %    % Immature Granulocytes 1 %    NRBCs per 100 WBC 0 <1 /100    Absolute Neutrophils 9.2 (H) 1.6 - 8.3 10e3/uL    Absolute Lymphocytes 0.7 (L) 0.8 - 5.3 10e3/uL    Absolute Monocytes 1.0 0.0 - 1.3 10e3/uL    Absolute Eosinophils 0.6 0.0 - 0.7 10e3/uL    Absolute Basophils 0.0 0.0 - 0.2 10e3/uL    Absolute Immature Granulocytes 0.1 <=0.4 10e3/uL    Absolute NRBCs 0.0 10e3/uL   TSH with free T4 reflex    Collection Time: 04/27/24  7:05 AM   Result Value Ref Range    TSH 1.12 0.30 - 4.20 uIU/mL   Hemoglobin A1c    Collection Time: 04/27/24  7:05 AM   Result Value Ref Range    Hemoglobin A1C 5.8 (H) <5.7 %   Troponin T, High Sensitivity    Collection Time: 04/27/24  8:03 AM   Result Value Ref Range    Troponin T, High Sensitivity 363 (HH) <=22 ng/L   Extra Purple Top Tube    Collection Time: 04/27/24  8:03 AM   Result Value Ref Range    Hold Specimen [facility]    ECG 12-Lead with MUSE - SJN,SJO,WW    Collection Time: 04/27/24 10:29 AM   Result Value Ref Range    Systolic Blood Pressure  mmHg    Diastolic Blood Pressure  mmHg    Ventricular Rate 137 BPM    Atrial Rate 174 BPM    IA Interval  ms    QRS Duration 94 ms     ms    QTc 474 ms    P Axis  degrees    R AXIS -39 degrees    T Axis 52 degrees    Interpretation ECG       Atrial fibrillation with rapid ventricular response  Left axis deviation  Abnormal ECG  When compared with ECG of 24-APR-2024 14:52,  Atrial fibrillation has replaced Sinus rhythm  Vent. rate has increased BY  61  BPM  Nonspecific T wave abnormality, worse in Anterolateral leads     Troponin T, High Sensitivity    Collection Time: 04/27/24 10:42 AM   Result Value Ref Range    Troponin T, High Sensitivity 325 (HH) <=22 ng/L       Pending Labs:  Unresulted Labs Ordered in the Past 30 Days of this Admission       Date and Time Order Name Status Description    4/27/2024  7:40 AM Lipid panel reflex to direct LDL In process     4/27/2024  7:39 AM Acute hepatitis panel In process     4/27/2024  7:38 AM Creatine Kinase Isoenzymes In process     4/27/2024  7:37 AM HIV Antigen Antibody Combo Cascade In process     4/25/2024  9:40 PM Rickettsia rickettsii antibody IgG & IgM In process     4/25/2024  8:34 PM Blood Culture Peripheral Blood Preliminary     4/25/2024  8:34 PM Blood Culture Peripheral Blood Preliminary     4/25/2024  8:34 PM Ehrlichia Anaplasma Sp by PCR In process     4/25/2024  8:34 PM Babesia Species by PCR In process     4/24/2024 12:54 PM Blood Culture Peripheral Blood Preliminary     4/24/2024 12:54 PM Blood Culture Peripheral Blood Preliminary             I spent 60 minutes in patient's care today.    STEPHAN HOWE MD  [facility]  [facility]  Phone: #[phone]    Securely message me with Erasto (more info)

## 2024-04-27 NOTE — PLAN OF CARE
"  Problem: Adult Inpatient Plan of Care  Goal: Patient-Specific Goal (Individualized)  Description: You can add care plan individualizations to a care plan. Examples of Individualization might be:  \"Parent requests to be called daily at 9am for status\", \"I have a hard time hearing out of my right ear\", or \"Do not touch me to wake me up as it startles  me\".  Outcome: Progressing   Goal Outcome Evaluation:    Patient denies pain overnight.   HR remains tachy in the 110's overnight.   Denies SOB but appears short of breath after walking. O2 sats remain stable on RA at 94%  IV abx administered per orders.   PIV SL.   Ambulating independently in room     "

## 2024-04-27 NOTE — PROGRESS NOTES
"PRIMARY DIAGNOSIS: \"GENERIC\" NURSING  OUTPATIENT/OBSERVATION GOALS TO BE MET BEFORE DISCHARGE:  ADLs back to baseline: Yes    Activity and level of assistance: Ambulating independently.    Pain status: Pain free.    Return to near baseline physical activity: Yes     Discharge Planner Nurse   Safe discharge environment identified: Yes  Barriers to discharge: Yes       Entered by: Ruthie Lang RN 04/27/2024     Patient appears to be resting comfortably in bed at this time.   No complaints of pain.   IV fluids d/c'ed per orders.   Vitally stable on RA. .  Up independently in the room.   "

## 2024-04-27 NOTE — PROGRESS NOTES
"[facility] Inpatient follow up       Patient:  Omar Perez  Date of birth 1950, Medical record number [id number]  Date of Visit:  2024  Attending Physician: Lamar Gorman MD         Assessment and Recommendations:   Assessment:  Omar Perez is a 73 year old male with   Lives by [facility].  Worked several hours outdoors planting trees.  Recent travel to [place].  Febrile illness of unclear etiology.  Associated mild leukocytosis.  Blood cultures no growth.  On IV ceftriaxone and doxycycline.  Tickborne illness workup so far negative.  Feels 95% better.  Diffuse skin rash.  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  Stress-induced cardiomyopathy on the differential diagnosis.    Recommendations:  Continue current antibiotics.  Planning to stop tomorrow if no new culture positive  Check coxsackie AMB serology.  Monitor white count, temperature.    Discussed with the patient, family, nursing staff.    ID will follow.    Agustina Blanco MD.  [facility].   [facility]  Office Telephone [phone].  Fax [phone]  [facility] paging            Interval History:     HPI:  The interval history was reviewed.   New to me today.  Diffuse 95% better.  Tolerating current antibiotics.  Lives by [facility].  Spent the whole day outdoors planting trees about a week ago.    Pertinent cultures include:  No results found for: \"CULT\"    Recent Inflammatory Biomarkers:   Recent Labs   Lab Test 24  0705 24  0630 24  1327 24  1320 22  1655   PCAL  --  0.99*  --   --   --   --    WBC 11.6* 11.9* 13.7* 9.7 5.4 5.8            Review of Systems:   CONSTITUTIONAL:    Temp Max: Temp (24hrs), Av.9  F (36.6  C), Min:97.5  F (36.4  C), Max:98.6  F (37  C)   .  Negative except for findings in the HPI.           Current Medications " (antimicrobials listed in bold):     Current Facility-Administered Medications   Medication Dose Route Frequency Provider Last Rate Last Admin    cefTRIAXone (ROCEPHIN) 2 g vial to attach to  ml bag for ADULTS or NS 50 ml bag for PEDS  2 g Intravenous Q24H Mandy Cesar MD   2 g at 04/27/24 1106    doxycycline (VIBRAMYCIN) 100 mg vial to attach to  mL bag  100 mg Intravenous Q12H Tristian Pereira DO   100 mg at 04/27/24 0908    metoprolol tartrate (LOPRESSOR) tablet 25 mg  25 mg Oral Once Rah Agosto MD        metoprolol tartrate (LOPRESSOR) tablet 50 mg  50 mg Oral Q6H Rah Agosto MD        ondansetron (ZOFRAN) injection 4 mg  4 mg Intravenous Once Breanne Quintero MD                  Allergies:   No Known Allergies         Physical Exam:   Vitals were reviewed  Patient Vitals for the past 24 hrs:   BP Temp Temp src Pulse Resp SpO2   04/27/24 1530 -- -- -- (!) 145 -- 97 %   04/27/24 1517 123/72 97.5  F (36.4  C) Oral (!) 136 18 96 %   04/27/24 1515 123/72 -- -- (!) 137 -- 95 %   04/27/24 1500 -- -- -- (!) 135 -- 95 %   04/27/24 1445 -- -- -- (!) 130 -- 97 %   04/27/24 1430 -- -- -- (!) 129 -- 98 %   04/27/24 1415 -- -- -- (!) 127 -- 98 %   04/27/24 1400 139/58 97.5  F (36.4  C) Oral (!) 129 22 97 %   04/27/24 1330 -- -- -- (!) 121 -- --   04/27/24 1023 117/69 97.5  F (36.4  C) Oral 99 20 98 %   04/26/24 2300 117/83 98.6  F (37  C) Oral 98 24 94 %   04/26/24 1622 132/76 98.3  F (36.8  C) Oral 92 18 95 %       Physical Examination:  Gen: Pleasant in no acute distress.  HEENT: NCAT. EOMI. PERRL.  Neck: No bruit, JVD or thyromegaly.  Lungs: Clear to ascultation bilat with no crackles or wheezes.  Card: RRR. NSR. No RMG. Peripheral pulses present and symmetric. No edema.  Abd: Soft NT ND. No mass. Normal bowel sounds.  Skin: Diffuse erythematous papular rash.  Extr: No edema.  Neuro: Alert and oriented to place time and person. Cranial nerves II to XII intact.           Laboratory Data:    ID Labs:  Microbiology labs:  Reviewed.    No lab results found.  Recent Labs   Lab Test 04/27/24  0705 04/26/24  0630 04/25/24 2010 04/24/24 1327 04/11/24  1320 01/05/22  1655   WBC 11.6* 11.9* 13.7* 9.7 5.4 5.8     Recent Labs   Lab Test 04/27/24  0705 04/26/24  0630 04/25/24 2010 04/24/24  1327   CR 1.08 1.12 1.42* 1.50*   GFRESTIMATED 72 69 52* 49*       Hematology Studies  Recent Labs   Lab Test 04/27/24  0705 04/26/24  0630 04/25/24 2010 04/24/24  1327 04/11/24  1320 01/05/22  1655   WBC 11.6* 11.9* 13.7* 9.7 5.4 5.8   HGB 13.4 13.2* 14.3 14.7 15.1 14.6   HCT 38.9* 38.8* 41.3 42.3 45.5 44.8    180 201 207 221 240       Metabolic  Recent Labs   Lab Test 04/27/24  0705 04/26/24  0630 04/25/24 2010   * 132* 130*   BUN 18.5 18.1 17.0   CO2 25 20* 23   CR 1.08 1.12 1.42*   GFRESTIMATED 72 69 52*       Hepatic Studies  Recent Labs   Lab Test 04/26/24  0630 04/25/24 2010 04/24/24  1327   BILITOTAL 0.5 0.6 0.7   ALKPHOS 128 145 162*   ALBUMIN 3.2* 3.6 4.0   AST 27 34 35   ALT 24 31 36       ImmunologlobulinsNo lab results found.         Imaging Data:   Reviewed

## 2024-04-28 LAB
ANION GAP SERPL CALCULATED.3IONS-SCNC: 13 MMOL/L (ref 7–15)
BUN SERPL-MCNC: 18.5 MG/DL (ref 8–23)
CALCIUM SERPL-MCNC: 8.9 MG/DL (ref 8.8–10.2)
CHLORIDE SERPL-SCNC: 104 MMOL/L (ref 98–107)
CREAT SERPL-MCNC: 0.82 MG/DL (ref 0.67–1.17)
DEPRECATED HCO3 PLAS-SCNC: 16 MMOL/L (ref 22–29)
EGFRCR SERPLBLD CKD-EPI 2021: >90 ML/MIN/1.73M2
GLUCOSE SERPL-MCNC: 97 MG/DL (ref 70–99)
POTASSIUM SERPL-SCNC: 4.5 MMOL/L (ref 3.4–5.3)
SODIUM SERPL-SCNC: 133 MMOL/L (ref 135–145)
TROPONIN T SERPL HS-MCNC: 214 NG/L
UFH PPP CHRO-ACNC: 0.14 IU/ML

## 2024-04-28 PROCEDURE — 250N000013 HC RX MED GY IP 250 OP 250 PS 637: Performed by: INTERNAL MEDICINE

## 2024-04-28 PROCEDURE — 84484 ASSAY OF TROPONIN QUANT: CPT | Performed by: STUDENT IN AN ORGANIZED HEALTH CARE EDUCATION/TRAINING PROGRAM

## 2024-04-28 PROCEDURE — 36415 COLL VENOUS BLD VENIPUNCTURE: CPT | Performed by: INTERNAL MEDICINE

## 2024-04-28 PROCEDURE — 250N000011 HC RX IP 250 OP 636: Performed by: INTERNAL MEDICINE

## 2024-04-28 PROCEDURE — 99232 SBSQ HOSP IP/OBS MODERATE 35: CPT | Performed by: STUDENT IN AN ORGANIZED HEALTH CARE EDUCATION/TRAINING PROGRAM

## 2024-04-28 PROCEDURE — 80048 BASIC METABOLIC PNL TOTAL CA: CPT | Performed by: STUDENT IN AN ORGANIZED HEALTH CARE EDUCATION/TRAINING PROGRAM

## 2024-04-28 PROCEDURE — 99232 SBSQ HOSP IP/OBS MODERATE 35: CPT | Performed by: INTERNAL MEDICINE

## 2024-04-28 PROCEDURE — 120N000004 HC R&B MS OVERFLOW

## 2024-04-28 PROCEDURE — 85520 HEPARIN ASSAY: CPT | Performed by: INTERNAL MEDICINE

## 2024-04-28 PROCEDURE — 258N000003 HC RX IP 258 OP 636: Performed by: HOSPITALIST

## 2024-04-28 PROCEDURE — 250N000011 HC RX IP 250 OP 636: Performed by: HOSPITALIST

## 2024-04-28 PROCEDURE — 36415 COLL VENOUS BLD VENIPUNCTURE: CPT | Performed by: STUDENT IN AN ORGANIZED HEALTH CARE EDUCATION/TRAINING PROGRAM

## 2024-04-28 PROCEDURE — 250N000013 HC RX MED GY IP 250 OP 250 PS 637: Performed by: HOSPITALIST

## 2024-04-28 RX ORDER — DILTIAZEM HYDROCHLORIDE 30 MG/1
60 TABLET, FILM COATED ORAL EVERY 6 HOURS SCHEDULED
Status: DISCONTINUED | OUTPATIENT
Start: 2024-04-28 | End: 2024-04-29

## 2024-04-28 RX ORDER — DILTIAZEM HYDROCHLORIDE 5 MG/ML
25 INJECTION INTRAVENOUS ONCE
Status: COMPLETED | OUTPATIENT
Start: 2024-04-28 | End: 2024-04-28

## 2024-04-28 RX ORDER — DILTIAZEM HYDROCHLORIDE 30 MG/1
60 TABLET, FILM COATED ORAL EVERY 6 HOURS SCHEDULED
Status: DISCONTINUED | OUTPATIENT
Start: 2024-04-28 | End: 2024-04-28

## 2024-04-28 RX ADMIN — APIXABAN 5 MG: 5 TABLET, FILM COATED ORAL at 09:43

## 2024-04-28 RX ADMIN — CEFTRIAXONE SODIUM 2 G: 2 INJECTION, POWDER, FOR SOLUTION INTRAMUSCULAR; INTRAVENOUS at 12:52

## 2024-04-28 RX ADMIN — CALCIUM CARBONATE (ANTACID) CHEW TAB 500 MG 1000 MG: 500 CHEW TAB at 21:53

## 2024-04-28 RX ADMIN — DOXYCYCLINE 100 MG: 100 INJECTION, POWDER, LYOPHILIZED, FOR SOLUTION INTRAVENOUS at 21:55

## 2024-04-28 RX ADMIN — METOPROLOL TARTRATE 50 MG: 50 TABLET, FILM COATED ORAL at 02:20

## 2024-04-28 RX ADMIN — APIXABAN 5 MG: 5 TABLET, FILM COATED ORAL at 21:53

## 2024-04-28 RX ADMIN — DILTIAZEM HYDROCHLORIDE 60 MG: 30 TABLET, FILM COATED ORAL at 12:52

## 2024-04-28 RX ADMIN — DILTIAZEM HYDROCHLORIDE 25 MG: 5 INJECTION INTRAVENOUS at 10:49

## 2024-04-28 RX ADMIN — DILTIAZEM HYDROCHLORIDE 60 MG: 30 TABLET, FILM COATED ORAL at 09:43

## 2024-04-28 RX ADMIN — HEPARIN SODIUM 1500 UNITS/HR: 10000 INJECTION, SOLUTION INTRAVENOUS at 02:09

## 2024-04-28 RX ADMIN — DOXYCYCLINE 100 MG: 100 INJECTION, POWDER, LYOPHILIZED, FOR SOLUTION INTRAVENOUS at 09:42

## 2024-04-28 RX ADMIN — DILTIAZEM HYDROCHLORIDE 60 MG: 30 TABLET, FILM COATED ORAL at 23:55

## 2024-04-28 RX ADMIN — SODIUM CHLORIDE 250 ML: 9 INJECTION, SOLUTION INTRAVENOUS at 01:12

## 2024-04-28 RX ADMIN — DILTIAZEM HYDROCHLORIDE 60 MG: 30 TABLET, FILM COATED ORAL at 18:20

## 2024-04-28 ASSESSMENT — ACTIVITIES OF DAILY LIVING (ADL)
ADLS_ACUITY_SCORE: 22

## 2024-04-28 NOTE — PROGRESS NOTES
Cardiology Progress Note    Assessment/Plan:    New onset atrial fibrillation with rapid ventricular response.  Metoprolol ineffective at achieving slowing heart rate.  Duration uncertain, may be paroxysmal occurring previously to account for decreased ejection fraction 4/26 but noted to be in sinus rhythm at that time.  Suspect most likely related to recent febrile illness.  With [id number] score of 2, soon-to-be 3, would suspect long-term anticoagulation indicated in the absence of contraindications.  Patient is agreeable will start Eliquis 5 mg twice daily.  For rate control will switch from metoprolol to diltiazem, giving IV bolus this a.m.  Suspect we could switch to once daily dosing tomorrow with hospital discharge.  Cardiomyopathy, new, suspect tachycardia mediated versus viral myocarditis but would recheck echocardiogram in 3  weeks prior to A-fib clinic follow-up (for possible planned cardioversion versus ablation procedure).  Discussed that with active lifestyle, early ablation may be most appropriate.  No clinical suspicion of obstructive coronary disease, troponins decreasing since elevation detected    Active Problems:    Dehydration    Febrile illness    AMADA (acute kidney injury) (H24)     LOS: 1 day     Subjective:  No awareness of rapid heartbeat.  No recollection of any improvement following rate slowing last evening.  Otherwise feeling better.  Denies shortness of breath.  Very concerned about ability to return to full activities.      Objective:   Vital signs in last 24 hours:  Vitals:    04/28/24 0800 04/28/24 0820 04/28/24 0941 04/28/24 1000   BP: 99/74 99/74 113/75    BP Location: Right arm Right arm     Pulse: (!) 131 (!) 129 (!) 126 (!) 134   Resp: 20 20     Temp: 97.8  F (36.6  C) 97.8  F (36.6  C)     TempSrc: Oral Oral     SpO2: 95% 96%     Weight:       Height:         Weight:   Wt Readings from Last 3 Encounters:   04/28/24 102.7 kg (226 lb 8 oz)   04/24/24 101.6 kg (224 lb)    04/24/24 102.5 kg (226 lb)           PHYSICAL EXAM      Respiratory:  Normal breath sounds, No respiratory distress, No wheezing, No chest tenderness.   Cardiovascular: Rapid irregular S1 and S2.  No murmur or gallop appreciated.  GI:  Bowel sounds normal, Soft, No tenderness, No masses  Extremities: no edema       Cardiographics:   Telemetry atrial fibrillation, 90 to 130 bpm.  Dropped to 90 after IV metoprolol last evening, gradually increased.  No significant slowing from metoprolol 50 every 6      Imaging:   CT chest PE run 4/24/2024:  CORONARY ARTERY CALCIFICATION: Mild.  1.  Negative for pulmonary emboli and negative for dissections.  2.  No significant findings in the lungs.  3.  Mild generalized cardiac enlargement.  4.  Stable 4.1 cm mild dilatation ascending thoracic aorta.        Cardiac diagnostics    Echocardiogram 4/26/2024: Personally reviewed: Normal sinus rhythm.    1. Biplane LVEF is 39%. There is mild-moderate global hypokinesia of the left ventricle.  2. Normal right ventricle size and systolic function.  3. No hemodynamically significant valvular abnormalities on 2D or color flow  imaging.  4. Aortic root dilatation is present, measuring 41 mm.  5. No evidence of endocarditis on this study  6. There is no comparison study available.     CT coronary calcium score 2022:  A calcium score =3. places the individual in the lowest 25th percentile when compared to an age and gender matched control group and implies a low risk of cardiac events in the next ten years (less than 1% per year).  Mildly dilated mid ascending aorta 42 mm.    Lab Results:   Lab Results   Component Value Date    WBC 11.6 (H) 04/27/2024    HGB 13.4 04/27/2024    HCT 38.9 (L) 04/27/2024     04/27/2024    CHOL 114 04/27/2024    TRIG 144 04/27/2024    HDL 21 (L) 04/27/2024    ALT 24 04/26/2024    AST 27 04/26/2024     (L) 04/28/2024    BUN 18.5 04/28/2024    CO2 16 (L) 04/28/2024    TSH 1.12 04/27/2024    PSA 4.43  "04/26/2024     No results found for: \"CKTOTAL\", \"CKMB\", \"TROPONINI\"  troponins      Rah Agosto MD FAC  4/28/2024     "

## 2024-04-28 NOTE — PLAN OF CARE
[facility] paged:  Current BP's 90/60-70's. AF RVR, HRs running mostly 110-120's but does shoot up to 140's. Asymptomatic. Q6H PO metoprolol 50mg due at 0200, but will not meet parameters for it at current rate. No IVF. Poor PO intake and no void since 1900. No urge, bladder scan min. IVF/bolus?     Response:  250mL NSB and monitor response.

## 2024-04-28 NOTE — PLAN OF CARE
Goal Outcome Evaluation:                 Outcome Evaluation: Pt is cooperative, elevated HR which is managed with meds. Otherwise, vitals stable on RA. SCDs is on, Pt denies pain.    Problem: Adult Inpatient Plan of Care  Goal: Plan of Care Review  Description: The Plan of Care Review/Shift note should be completed every shift.  The Outcome Evaluation is a brief statement about your assessment that the patient is improving, declining, or no change.  This information will be displayed automatically on your shift  note.  Outcome: Progressing  Flowsheets (Taken 4/27/2024 1931)  Outcome Evaluation: Pt is cooperative, elevated HR which is managed with meds. Otherwise, vitals stable on RA. SCDs is on, Pt denies pain.     Problem: Adult Inpatient Plan of Care  Goal: Optimal Comfort and Wellbeing  Outcome: Progressing     Problem: Risk for Delirium  Goal: Improved Sleep  Outcome: Progressing

## 2024-04-28 NOTE — PROGRESS NOTES
[facility] MEDICINE  PROGRESS NOTE       Securely message me with Erasto (more info)    Code Status: Full Code       Identification/Summary:   Omar Perez is a 73 year old male with PMHx significant for dilated thoracic aorta, BPH who was admitted on 4/25/2024. He presented with generalized weakness, nausea, and fevers.  Test for atypical infection worsened.  Infectious disease were consulted.  Empiric doxycycline and ceftriaxone were started. Found to have new low EF of 39% and troponin of 363. Cardiology consulted.  Developed A-fib with RVR.  Metoprolol is titrated and Eliquis is started.         Barriers to Discharge:Afib with RVR, possible myocarditis, possible discharge tomorrow     Disposition: Inpatient     Assessment and Plan:  Acute febrile illness and rash  Presenting with intermittent fevers up to 103 F, persistent nausea without vomiting, and generalized weakness over the past week with grossly negative workup and unclear etiology. Papular rash over his back and chest abdomen.  Pro-Reinaldo is 0.99.   - s/p IV F  -4/25 start doxycycline 100 mg IV q12h to cover for potential tickborne illness, ID added ceftriaxone on 4/26  - Infectious Disease consult  - Follow pending blood tests (Ehrlichia, Lyme -, Babesia, respiratory panel-), added Rickettsia IgG and IgM, ID added TTE, PSA -. Added HIV-, hepatitis panel -, Coxsackie A/B  - Follow blood cultures     Afib with RVR  Troponin elevation   New on 4/27. Possible related viral myocarditis.  TSH is normal.  LDL is 64.  Hemoglobin A1c 5.8.  - continue monitor trop, down trending  -Started on metoprolol 25 twice bid > 50 q6h  -Heparin drip per cardiology > Eliquis  - Check Magnesium normal     Acute kidney injury -improving  Dehydration  Cr 1.42 on arrival, down slightly from 1.50 the day prior. Likely due to dehydration and poor oral intake.  - IV fluids as above, stopped  - Avoid nephrotoxic agents     Dilation of ascending  "thoracic aorta, chronic  Stable at 4.1 cm per CT Chest from 4/24/2024.     Benign prostatic hyperplasia  Not on any medications for this. No new urinary symptoms.     Urine protein  Follow-up as an outpatient     Poor appetite  Ensure added     Hyponatremia  Related to infection?  Low p.o. intake?  Monitor    Anticoagulation   Orders (Includes Only Anticoagulants)  apixaban ANTICOAGULANT, 5 mg, Oral, BID      Therapy: None  [place]:Not present  Lines: None       Current Diet  Orders Placed This Encounter      Regular Diet Adult        Clinically Significant Risk Factors              # Hypoalbuminemia: Lowest albumin = 3.2 g/dL at 4/26/2024  6:30 AM, will monitor as appropriate            # Overweight: Estimated body mass index is 28.31 kg/m  as calculated from the following:    Height as of this encounter: 1.905 m (6' 3\").    Weight as of this encounter: 102.7 kg (226 lb 8 oz)., PRESENT ON ADMISSION            Interval History/Subjective:  He feels better today.  He is eager to go home.  He denies any palpitation, chest pain, or dyspnea.      Last 24H PRN:     acetaminophen (TYLENOL) tablet 975 mg, 975 mg at 04/27/24 2011    Physical Exam/Objective:  Temp:  [97.5  F (36.4  C)-99.1  F (37.3  C)] 97.8  F (36.6  C)  Pulse:  [] 95  Resp:  [18-22] 18  BP: ()/(58-77) 103/59  SpO2:  [94 %-98 %] 94 %  Wt Readings from Last 4 Encounters:   04/28/24 102.7 kg (226 lb 8 oz)   04/24/24 101.6 kg (224 lb)   04/24/24 102.5 kg (226 lb)   04/11/24 103.4 kg (228 lb)     Body mass index is 28.31 kg/m .    General Appearance: Alert and wake, not in distress  Respiratory: clear lungs, no crackles or wheezing  Cardiovascular: Irregular, normal S1 and S2, no murmur  Neurology: oriented x 3  Psych: cooperative and calm, normal affect  Skin: On the back, rash has darkened    Medications:   Personally Reviewed.  Medications   Current Facility-Administered Medications   Medication Dose Route Frequency Provider Last Rate Last Admin "     Current Facility-Administered Medications   Medication Dose Route Frequency Provider Last Rate Last Admin    apixaban ANTICOAGULANT (ELIQUIS) tablet 5 mg  5 mg Oral BID Rah Agosto MD   5 mg at 04/28/24 0943    cefTRIAXone (ROCEPHIN) 2 g vial to attach to  ml bag for ADULTS or NS 50 ml bag for PEDS  2 g Intravenous Q24H Mandy Cesar MD   2 g at 04/28/24 1252    diltiazem (CARDIZEM) tablet 60 mg  60 mg Oral Q6H SETH Rah Agosto MD   60 mg at 04/28/24 1252    doxycycline (VIBRAMYCIN) 100 mg vial to attach to  mL bag  100 mg Intravenous Q12H Tristian Pereira DO   100 mg at 04/28/24 0942    ondansetron (ZOFRAN) injection 4 mg  4 mg Intravenous Once Breanne Quintero MD           Data reviewed today: I personally reviewed all new medications, labs, imaging/diagnostics reports over the past 24 hours. Pertinent findings include:    Imaging:   No results found for this or any previous visit (from the past 24 hour(s)).    Labs:  Echocardiogram Complete   Final Result        Recent Results (from the past 24 hour(s))   Troponin T, High Sensitivity    Collection Time: 04/27/24  4:50 PM   Result Value Ref Range    Troponin T, High Sensitivity 254 (HH) <=22 ng/L   Heparin Unfractionated Anti Xa Level    Collection Time: 04/27/24  4:50 PM   Result Value Ref Range    Anti Xa Unfractionated Heparin 0.23 For Reference Range, See Comment IU/mL   Heparin Unfractionated Anti Xa Level    Collection Time: 04/28/24 12:08 AM   Result Value Ref Range    Anti Xa Unfractionated Heparin 0.14 For Reference Range, See Comment IU/mL   Troponin T, High Sensitivity    Collection Time: 04/28/24 12:08 AM   Result Value Ref Range    Troponin T, High Sensitivity 214 (HH) <=22 ng/L   Basic metabolic panel    Collection Time: 04/28/24  7:59 AM   Result Value Ref Range    Sodium 133 (L) 135 - 145 mmol/L    Potassium 4.5 3.4 - 5.3 mmol/L    Chloride 104 98 - 107 mmol/L    Carbon Dioxide (CO2) 16 (L) 22 - 29 mmol/L    Anion  Gap 13 7 - 15 mmol/L    Urea Nitrogen 18.5 8.0 - 23.0 mg/dL    Creatinine 0.82 0.67 - 1.17 mg/dL    GFR Estimate >90 >60 mL/min/1.73m2    Calcium 8.9 8.8 - 10.2 mg/dL    Glucose 97 70 - 99 mg/dL       Pending Labs:  Unresulted Labs Ordered in the Past 30 Days of this Admission       Date and Time Order Name Status Description    4/27/2024  4:27 PM Coxsackie A Antibodies (Serotypes 2,4,7,9,10,16), Serum In process     4/27/2024  4:27 PM Coxsackie B virus antibodies In process     4/27/2024  7:38 AM Creatine Kinase Isoenzymes In process     4/25/2024  9:40 PM Rickettsia rickettsii antibody IgG & IgM In process     4/25/2024  8:34 PM Blood Culture Peripheral Blood Preliminary     4/25/2024  8:34 PM Blood Culture Peripheral Blood Preliminary     4/25/2024  8:34 PM Ehrlichia Anaplasma Sp by PCR In process     4/25/2024  8:34 PM Babesia Species by PCR In process     4/24/2024 12:54 PM Blood Culture Peripheral Blood Preliminary     4/24/2024 12:54 PM Blood Culture Peripheral Blood Preliminary               STEPHAN HOWE MD  [facility] Medicine  [facility]  Phone: #[phone]    Securely message me with Erasto (more info)

## 2024-04-28 NOTE — PLAN OF CARE
"Problem: Fever  Goal: Body Temperature in Desired Range  Outcome: Progressing     Problem: Dysrhythmia  Goal: Normalized Cardiac Rhythm  Outcome: Progressing     Tele Afib RVR -120's for most part, does get as high as 150's with mild activity. Bps remain borderline for metoprolol admin, received bolus and was able to receive 0200 dose. No fever. Appetite improving. Still feeling weak but \"much improved.\" Remains on heparin infusion overnight with plan to begin Eliquis this morning.  "

## 2024-04-28 NOTE — ED PROVIDER NOTES
EMERGENCY DEPARTMENT ENCOUNTER      NAME: Omar Perez  AGE: 73 year old male  YOB: 1950  MRN: [id number]  EVALUATION DATE & TIME: 4/25/2024  7:46 PM    PCP: Shorty Hernandez    ED PROVIDER: Breanne Quintero MD      Chief Complaint   Patient presents with    Nausea         FINAL IMPRESSION:  1. Dehydration    2. Febrile illness          ED COURSE & MEDICAL DECISION MAKING:    Pertinent Labs & Imaging studies reviewed. (See chart for details)    8:46 PM I introduced myself to the patient, obtained patient history, performed a physical exam, and discussed plan for ED workup including potential diagnostic laboratory/imaging studies and interventions.    73 year old male with a pertinent history of dilation of the thoracic aorta, BPH who presents to this ED for evaluation of fever, generalized weakness, decreased appetite and nausea.     Patient was seen for same complaint here in the ER yesterday and had large work up which I reviewed. EKG with normal sinus rhythm and no signs of acute ischemia. CT of the chest as well as Abdomen and pelvis with contrast without acute pathology or signs of infectious process. Labs largely unrevealing with group A strep negative, COVID-19 influenza and RSV PCR negative, 2 peripheral blood cultures with no growth to date, CK within normal limits.  Lactic acid within normal limits.  Lipase within normal limits.  Hepatic panel revealed a slightly elevated alk phos of 162 with a bilirubin total of 0.7 AST ALT within normal limits.  BMP with a creatinine of 1.5 which was slightly elevated above his baseline.  Sodium 130.  CBC with white blood cell count 9.7 with a hemoglobin of 14.7.  UA with 3 red blood cells and 3 white blood cells with negative nitrate and leukocyte Estrace.  He reportedly was feeling somewhat improved after fluids and decided to discharge but returns today as he is feeling worse at home and feels very dehydrated and that he now needs to be  admitted.    On exam here, he does appear dry with dry mucous membranes.  However his vital signs are within normal limits and he is afebrile.  Has regular rate and rhythm and denies any chest pain or shortness of breath.  EKG unremarkable yesterday and with regular rate and rhythm on my exam and no anginal symptoms did not repeat this at this time.  He is in no acute distress.  On exam the only thing that I can find is that he has scattered slightly erythematous papules on his back and abdomen however he states that his abdomen appearing this way is baseline.  He is uncertain about his back.  Has no involvement of the palms or soles or mucous membranes.  No rashes on the extremities.  No target lesions.  He is suggesting this is possible this may be baseline for him at least on the abdomen.  Otherwise has no abdominal pain or tenderness.  Overall benign exam with no focal neurologic deficits.  Has normal reflexes and again no focal neurologic deficits.  Did repeat laboratory studies and added on tickborne illness panel and repeated blood cultures here.  Had a large imaging workup yesterday and thus did not repeat this at this time.  CMP here continues to show slightly elevated creatinine of 1.42.  His alk phos AST ALT and bilirubin are normal here.  Has no abdominal pain or tenderness to suggest gallbladder pathology or intra-abdominal pathology and had negative CT yesterday.  Urinalysis again with 3 red blood cells but 4 white blood cells and no obvious sign of UTI or pyelonephritis.  Lipase within normal limits.  Lactic acid within normal limits.  Did order respiratory viral PCR which is pending.  He was given a liter of IV fluids here.  Again uncertain what may be causing his illness.  Broad differential including viral, tickborne with his travel to [place], endocarditis however no murmur here and previous blood cultures yesterday without any growth, etc.  He does appear dehydrated and has failed outpatient  trial and thus we will admit him for further evaluation.  He and his wife are in agreement with this plan.  Spoke with the hospitalist who accepted the patient for admission.  His white blood cell count today was Also elevated at 13.7 which is up from yesterday at this point nonspecific.  Medicine will evaluate the patient and decide on antibiotics as at this point unsure what we would be treating and they wanted to evaluate the patient first.  At this point no obvious signs of sepsis.  Normal lactic acid.  No meningismus to suggest meningitis. He was admitted in stable condition.         At the conclusion of the encounter I discussed the results of all of the tests and the disposition. The questions were answered. The patient or family acknowledged understanding and was agreeable with the care plan.         Medical Decision Making  Obtained supplemental history:Supplemental history obtained?: Documented in chart  Reviewed external records: External records reviewed?: Documented in chart  Care impacted by chronic illness:Other: thoracic aortic dilation, BPH  Care significantly affected by social determinants of health:N/A  Did you consider but not order tests?: Work up considered but not performed and documented in chart, if applicable  Did you interpret images independently?: Independent interpretation of ECG and images noted in documentation, when applicable.  Consultation discussion with other provider:Did you involve another provider (consultant, MH, pharmacy, etc.)?: I discussed the care with another health care provider, see documentation for details.  Admit.      MEDICATIONS GIVEN IN THE EMERGENCY:  Medications   ondansetron (ZOFRAN) injection 4 mg (0 mg Intravenous Hold 4/25/24 2114)   senna-docusate (SENOKOT-S/PERICOLACE) 8.6-50 MG per tablet 1 tablet (has no administration in time range)     Or   senna-docusate (SENOKOT-S/PERICOLACE) 8.6-50 MG per tablet 2 tablet (has no administration in time range)    ondansetron (ZOFRAN ODT) ODT tab 4 mg (has no administration in time range)     Or   ondansetron (ZOFRAN) injection 4 mg (has no administration in time range)   acetaminophen (TYLENOL) tablet 975 mg (975 mg Oral $Given 4/27/24 2011)   docusate sodium (COLACE) capsule 100 mg (has no administration in time range)   melatonin tablet 1 mg (1 mg Oral $Given 4/26/24 2239)   prochlorperazine (COMPAZINE) injection 5 mg (has no administration in time range)     Or   prochlorperazine (COMPAZINE) tablet 5 mg (has no administration in time range)     Or   prochlorperazine (COMPAZINE) suppository 12.5 mg (has no administration in time range)   doxycycline (VIBRAMYCIN) 100 mg vial to attach to  mL bag (100 mg Intravenous $New Bag 4/27/24 0908)   calcium carbonate (TUMS) chewable tablet 1,000 mg (1,000 mg Oral $Given 4/26/24 2239)   lactated ringers infusion (0 mLs Intravenous Stopped 4/27/24 1110)   cefTRIAXone (ROCEPHIN) 2 g vial to attach to  ml bag for ADULTS or NS 50 ml bag for PEDS (2 g Intravenous $New Bag 4/27/24 1106)   metoprolol tartrate (LOPRESSOR) tablet 50 mg (50 mg Oral $Given 4/27/24 2010)   apixaban ANTICOAGULANT (ELIQUIS) tablet 5 mg (has no administration in time range)   heparin 25,000 units in 0.45% NaCl 250 mL ANTICOAGULANT infusion (1,350 Units/hr Intravenous Rate/Dose Change 4/27/24 1728)   sodium chloride 0.9% BOLUS 1,000 mL (0 mLs Intravenous Stopped 4/25/24 2145)   perflutren lipid microsphere (DEFINITY) injection SUSP 3 mL (3 mLs Intravenous $Given 4/26/24 1352)   heparin ANTICOAGULANT loading dose for  LOW INTENSITY TREATMENT* Give BEFORE starting heparin infusion (6,000 Units Intravenous $Given 4/27/24 1435)   metoprolol tartrate (LOPRESSOR) tablet 25 mg (25 mg Oral $Given 4/27/24 1558)   diltiazem (CARDIZEM) injection 25 mg (25 mg Intravenous $Given 4/27/24 1717)   heparin - BOLUS DOSE from infusion (3,000 Units Intravenous $Given 4/27/24 1731)       NEW PRESCRIPTIONS STARTED AT TODAY'S  ER VISIT  Current Discharge Medication List             =================================================================    \[facility]\""    Patient information was obtained from: Patient, wife    Omar Perez is a 73 year old male with a pertinent history of dilation of the thoracic aorta, BPH who presents to this ED for evaluation of fever, generalized weakness, decreased appetite and nausea.     Patient reports that he traveled to [place] to see his family 1 week ago. Reportedly, within one day of being in [place] he developed a fever and has now had intermittent fevers since that time. He states that he as associated generalized weakness and fatigue. He denies any falls or loss of consciousness. He states he feels very nauseated with eating or drinking and thus has not been eating much over the past few days. He denies vomiting however. Is trying to drink water but gets nauseated with this as well. Last took tylenol 3 hours PTA. He feels dehydrated and like his urine is darker in color. Denies any known sick contacts. He reports a mildly sore throat. Reports he was somewhat constipated so took a stool softener and then had an episode of softer stool today, otherwise no diarrhea. No melena, hematochezia, or hematemesis. No severe headaches, abdominal pain, dysuria, urgency, cough, congestion, chest pain, shortness of breath, leg or joint swelling, joint pain, rashes. He states they did not camp. No known tick or bug bites. Wife reports [place], but have not seen any ticks. No animal exposure. He denies any numbness, tingling, palpitations, dizziness/light headedness.     Patient was seen here in the ER yesterday with large unrevealing work up (see chart review above in MDM) and returns as not improved and feels he needs admission.       REVIEW OF SYSTEMS   Pertinent positives and negatives are documented in the HPI. All other systems reviewed and are negative.      PAST MEDICAL HISTORY:  History  reviewed. No pertinent past medical history.    PAST SURGICAL HISTORY:  Past Surgical History:   Procedure Laterality Date    HERNIA REPAIR             CURRENT MEDICATIONS:    No current outpatient medications on file.      ALLERGIES:  No Known Allergies    FAMILY HISTORY:  Family History   Problem Relation Age of Onset    Prostate Cancer Father     No Known Problems Daughter     Alcoholism Daughter        SOCIAL HISTORY:   Social History     Socioeconomic History    Marital status:      Spouse name: None    Number of children: None    Years of education: None    Highest education level: None   Tobacco Use    Smoking status: Never    Smokeless tobacco: Never   Vaping Use    Vaping status: Never Used   Substance and Sexual Activity    Alcohol use: Yes     Alcohol/week: 2.0 standard drinks of alcohol    Drug use: Never     Social Determinants of Health     Financial Resource Strain: Low Risk  (4/10/2024)    Financial Resource Strain     Within the past 12 months, have you or your family members you live with been unable to get utilities (heat, electricity) when it was really needed?: No   Food Insecurity: Low Risk  (4/10/2024)    Food Insecurity     Within the past 12 months, did you worry that your food would run out before you got money to buy more?: No     Within the past 12 months, did the food you bought just not last and you didn t have money to get more?: No   Transportation Needs: Low Risk  (4/10/2024)    Transportation Needs     Within the past 12 months, has lack of transportation kept you from medical appointments, getting your medicines, non-medical meetings or appointments, work, or from getting things that you need?: No   Physical Activity: Sufficiently Active (4/10/2024)    Exercise Vital Sign     Days of Exercise per Week: 3 days     Minutes of Exercise per Session: 50 min   Stress: No Stress Concern Present (4/10/2024)    [employer] [place] - Occupational Stress  "Questionnaire     Feeling of Stress : Not at all   Social Connections: Unknown (4/10/2024)    Social Connection and Isolation Panel [NHANES]     Frequency of Social Gatherings with Friends and Family: Once a week   Interpersonal Safety: Low Risk  (4/11/2024)    Interpersonal Safety     Do you feel physically and emotionally safe where you currently live?: Yes     Within the past 12 months, have you been hit, slapped, kicked or otherwise physically hurt by someone?: No     Within the past 12 months, have you been humiliated or emotionally abused in other ways by your partner or ex-partner?: No   Housing Stability: Low Risk  (4/10/2024)    Housing Stability     Do you have housing? : Yes     Are you worried about losing your housing?: No       VITALS:  /72 (BP Location: Right arm, Cuff Size: Adult Regular)   Pulse 113   Temp 99.1  F (37.3  C) (Oral)   Resp 22   Ht 1.905 m (6' 3\")   Wt 101.8 kg (224 lb 6.4 oz)   SpO2 96%   BMI 28.05 kg/m      PHYSICAL EXAM    Physical Exam  Constitutional: NAD, GCS 15  HENT: Normocephalic, Atraumatic, Bilateral external ears normal, TMs normal, Oropharynx: erythematous posterior oropharynx, no exudate, no peritonsillar swelling, uvula is midline, no trismus, mucous membranes dry, Nose normal. Neck-  Normal range of motion, No tenderness, Supple, No stridor.    Eyes: PERRL, EOMI, Conjunctiva normal, No discharge.   Respiratory: Normal breath sounds, No respiratory distress, No wheezing or crackles, Speaks in full sentences easily.    Cardiovascular: Normal heart rate, Regular rhythm, No murmurs, No rubs, No gallops. 2+ radial pulses bilaterally  GI: Bowel sounds normal, Soft, No tenderness, No masses, No rebound or guarding. No CVA tenderness bilaterally   Musculoskeletal: 2+ DP pulses. No notable lower extremity edema. No cyanosis, No clubbing. Good range of motion in all major joints. No tenderness to palpation or major deformities noted.   Integument: Warm, Dry, " Scattered erythematous papules on the back and abdomen (per patient he has had this on his abdomen at baseline, unsure about his back), no rash on extremities, no target lesions, no mucosal rash, no purpura, no vesicles, no petechia or splinter hemorrhages, no rashes on the palms or soles   Neurologic: Alert & oriented x 3, 5/5 strength in all 4 extremities bilaterally. Sensation intact to light touch in all 4 extremities and the face bilaterally. No focal deficits noted. 2+ upper and lower extremity reflexes.   Psychiatric: Affect normal, Judgment normal, Mood normal. Cooperative.      LAB:  All pertinent labs reviewed and interpreted.  Labs Ordered and Resulted from Time of ED Arrival to Time of ED Departure   COMPREHENSIVE METABOLIC PANEL - Abnormal       Result Value    Sodium 130 (*)     Potassium 4.3      Carbon Dioxide (CO2) 23      Anion Gap 12      Urea Nitrogen 17.0      Creatinine 1.42 (*)     GFR Estimate 52 (*)     Calcium 9.3      Chloride 95 (*)     Glucose 125 (*)     Alkaline Phosphatase 145      AST 34      ALT 31      Protein Total 7.3      Albumin 3.6      Bilirubin Total 0.6     ROUTINE UA WITH MICROSCOPIC REFLEX TO CULTURE - Abnormal    Color Urine Yellow      Appearance Urine Turbid (*)     Glucose Urine Negative      Bilirubin Urine Negative      Ketones Urine Negative      Specific Gravity Urine 1.027      Blood Urine 0.1 mg/dL (*)     pH Urine 6.0      Protein Albumin Urine 100 (*)     Urobilinogen Urine 2.0 (*)     Nitrite Urine Negative      Leukocyte Esterase Urine Negative      Mucus Urine Present (*)     RBC Urine 3 (*)     WBC Urine 4      Squamous Epithelials Urine <1      Hyaline Casts Urine 2     CBC WITH PLATELETS AND DIFFERENTIAL - Abnormal    WBC Count 13.7 (*)     RBC Count 4.69      Hemoglobin 14.3      Hematocrit 41.3      MCV 88      MCH 30.5      MCHC 34.6      RDW 13.4      Platelet Count 201      % Neutrophils 84      % Lymphocytes 4      % Monocytes 9      % Eosinophils  3      % Basophils 0      % Immature Granulocytes 0      NRBCs per 100 WBC 0      Absolute Neutrophils 11.5 (*)     Absolute Lymphocytes 0.5 (*)     Absolute Monocytes 1.2      Absolute Eosinophils 0.4      Absolute Basophils 0.0      Absolute Immature Granulocytes 0.0      Absolute NRBCs 0.0     LIPASE - Normal    Lipase 26     LACTIC ACID WHOLE BLOOD - Normal    Lactic Acid 1.6     BABESIA SPECIES BY PCR   EHRLICHIA ANAPLASMA SP BY PCR   RICKETTSIA RICKETTSII ANTIBODY IGG & IGM         RADIOLOGY:  Reviewed all pertinent imaging. Please see official radiology report.          [facility] Documentation:   CMS Diagnoses:                 Breanne Quintero MD  [facility]  [address] [id number] [phone]       Breanne Quintero MD  04/27/24 2132       Breanne Quintero MD  04/27/24 2137

## 2024-04-28 NOTE — PROGRESS NOTES
"[facility] ID Inpatient follow up       Patient:  Omar Perez  Date of birth 1950, Medical record number [id number]  Date of Visit:  2024  Attending Physician: Lamar Gorman MD         Assessment and Recommendations:   Assessment:  Omar Perez is a 73 year old male with   Lives by [facility].  Worked several hours outdoors planting trees.  Recent travel to [place].  Febrile illness of unclear etiology.  Associated mild leukocytosis.  Blood cultures no growth.  On IV ceftriaxone and doxycycline.  Tickborne illness workup so far negative.  Feels 95% better.  Diffuse skin rash.  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  Stress-induced cardiomyopathy on the differential diagnosis.    Recommendations:  Discontinue IV ceftriaxone.  Continue doxycycline.  Will probably do 10 to 14 days given the significant improvement noted after initiation of therapy of this antibiotic.    Follow-up pending coxsackie A&B serology.  Monitor white count, temperature.    Discussed with the patient, family, nursing staff.    ID will follow.    Agustina Blanco MD.  [facility].   [facility]  Office Telephone [phone].  Fax [phone]  [facility] paging            Interval History:     HPI:  The interval history was reviewed.   Doing overall better today.  New A-fib.  Neurology following.  Eliquis started.    Pertinent cultures include:  No results found for: \"CULT\"    Recent Inflammatory Biomarkers:   Recent Labs   Lab Test 24  0705 24  0630 24  1327 24  1320 22  1655   PCAL  --  0.99*  --   --   --   --    WBC 11.6* 11.9* 13.7* 9.7 5.4 5.8            Review of Systems:   CONSTITUTIONAL:    Temp Max: Temp (24hrs), Av.9  F (36.6  C), Min:97.5  F (36.4  C), Max:98.6  F (37  C)   .  Negative except for findings in the HPI.           Current " Medications (antimicrobials listed in bold):     Current Facility-Administered Medications   Medication Dose Route Frequency Provider Last Rate Last Admin    apixaban ANTICOAGULANT (ELIQUIS) tablet 5 mg  5 mg Oral BID Rah Agosto MD   5 mg at 04/28/24 0943    cefTRIAXone (ROCEPHIN) 2 g vial to attach to  ml bag for ADULTS or NS 50 ml bag for PEDS  2 g Intravenous Q24H Mandy Cesar MD   2 g at 04/28/24 1252    diltiazem (CARDIZEM) tablet 60 mg  60 mg Oral Q6H SETH Rah Agosto MD   60 mg at 04/28/24 1252    doxycycline (VIBRAMYCIN) 100 mg vial to attach to  mL bag  100 mg Intravenous Q12H Tristian Pereira DO   100 mg at 04/28/24 0942    ondansetron (ZOFRAN) injection 4 mg  4 mg Intravenous Once Breanne Quintero MD                  Allergies:   No Known Allergies         Physical Exam:   Vitals were reviewed  Patient Vitals for the past 24 hrs:   BP Temp Temp src Pulse Resp SpO2 Weight   04/28/24 1139 103/59 97.8  F (36.6  C) Oral 95 18 94 % --   04/28/24 1000 -- -- -- (!) 134 -- -- --   04/28/24 0941 113/75 -- -- (!) 126 -- -- --   04/28/24 0820 99/74 97.8  F (36.6  C) Oral (!) 129 20 96 % --   04/28/24 0800 99/74 97.8  F (36.6  C) Oral (!) 131 20 95 % --   04/28/24 0357 97/77 98.5  F (36.9  C) Oral 109 -- 94 % --   04/28/24 0220 113/71 -- -- (!) 128 -- -- --   04/28/24 0119 -- -- -- -- -- -- 102.7 kg (226 lb 8 oz)   04/28/24 0044 93/71 -- -- 116 -- -- --   04/27/24 2337 93/64 98.8  F (37.1  C) Oral 117 20 94 % --   04/27/24 1916 120/72 99.1  F (37.3  C) Oral 113 22 -- --   04/27/24 1800 -- -- -- 104 -- 96 % --   04/27/24 1700 -- -- -- (!) 133 -- 95 % --   04/27/24 1600 126/68 -- -- (!) 139 -- 96 % --   04/27/24 1558 126/68 -- -- (!) 139 -- -- --   04/27/24 1530 -- -- -- (!) 145 -- 97 % --   04/27/24 1517 123/72 97.5  F (36.4  C) Oral (!) 136 18 96 % --   04/27/24 1515 123/72 -- -- (!) 137 -- 95 % --   04/27/24 1500 -- -- -- (!) 135 -- 95 % --   04/27/24 1445 -- -- -- (!) 130 -- 97 % --    04/27/24 1430 -- -- -- (!) 129 -- 98 % --   04/27/24 1415 -- -- -- (!) 127 -- 98 % --   04/27/24 1400 139/58 97.5  F (36.4  C) Oral (!) 129 22 97 % --   04/27/24 1330 -- -- -- (!) 121 -- -- --       Physical Examination:  Gen: Pleasant in no acute distress.  HEENT: NCAT. EOMI. PERRL.  Neck: No bruit, JVD or thyromegaly.  Lungs: Clear to ascultation bilat with no crackles or wheezes.  Card: RRR. NSR. No RMG. Peripheral pulses present and symmetric. No edema.  Abd: Soft NT ND. No mass. Normal bowel sounds.  Skin: Diffuse erythematous papular rash.  Extr: No edema.  Neuro: Alert and oriented to place time and person. Cranial nerves II to XII intact.           Laboratory Data:   ID Labs:  Microbiology labs:  Reviewed.    No lab results found.  Recent Labs   Lab Test 04/27/24  0705 04/26/24  0630 04/25/24 2010 04/24/24  1327 04/11/24  1320 01/05/22  1655   WBC 11.6* 11.9* 13.7* 9.7 5.4 5.8     Recent Labs   Lab Test 04/28/24 0759 04/27/24  0705 04/26/24  0630 04/25/24 2010   CR 0.82 1.08 1.12 1.42*   GFRESTIMATED >90 72 69 52*       Hematology Studies  Recent Labs   Lab Test 04/27/24  0705 04/26/24  0630 04/25/24 2010 04/24/24  1327 04/11/24  1320 01/05/22  1655   WBC 11.6* 11.9* 13.7* 9.7 5.4 5.8   HGB 13.4 13.2* 14.3 14.7 15.1 14.6   HCT 38.9* 38.8* 41.3 42.3 45.5 44.8    180 201 207 221 240       Metabolic  Recent Labs   Lab Test 04/28/24 0759 04/27/24  0705 04/26/24  0630   * 133* 132*   BUN 18.5 18.5 18.1   CO2 16* 25 20*   CR 0.82 1.08 1.12   GFRESTIMATED >90 72 69       Hepatic Studies  Recent Labs   Lab Test 04/26/24  0630 04/25/24 2010 04/24/24  1327   BILITOTAL 0.5 0.6 0.7   ALKPHOS 128 145 162*   ALBUMIN 3.2* 3.6 4.0   AST 27 34 35   ALT 24 31 36       ImmunologlobulinsNo lab results found.         Imaging Data:   Reviewed

## 2024-04-29 LAB
A PHAGOCYTOPH DNA BLD QL NAA+PROBE: NOT DETECTED
ANION GAP SERPL CALCULATED.3IONS-SCNC: 10 MMOL/L (ref 7–15)
ATRIAL RATE - MUSE: 174 BPM
B MICROTI DNA BLD QL NAA+PROBE: NOT DETECTED
BABESIA DNA BLD QL NAA+PROBE: NOT DETECTED
BACTERIA BLD CULT: NO GROWTH
BACTERIA BLD CULT: NO GROWTH
BUN SERPL-MCNC: 16.2 MG/DL (ref 8–23)
CALCIUM SERPL-MCNC: 8.5 MG/DL (ref 8.8–10.2)
CHLORIDE SERPL-SCNC: 104 MMOL/L (ref 98–107)
CK BB CFR SERPL ELPH: 0 %
CK MACRO1 CFR SERPL: 0 %
CK MACRO2 CFR SERPL: 0 %
CK MB CFR SERPL ELPH: 0 %
CK MM CFR SERPL ELPH: 100 %
CK SERPL-CCNC: 26 U/L
CREAT SERPL-MCNC: 0.9 MG/DL (ref 0.67–1.17)
DEPRECATED HCO3 PLAS-SCNC: 23 MMOL/L (ref 22–29)
DIASTOLIC BLOOD PRESSURE - MUSE: NORMAL MMHG
E CHAFFEENSIS DNA BLD QL NAA+PROBE: NOT DETECTED
E EWINGII DNA SPEC QL NAA+PROBE: NOT DETECTED
EGFRCR SERPLBLD CKD-EPI 2021: 90 ML/MIN/1.73M2
EHRLICHIA DNA SPEC QL NAA+PROBE: NOT DETECTED
GLUCOSE SERPL-MCNC: 136 MG/DL (ref 70–99)
HOLD SPECIMEN: NORMAL
INTERPRETATION ECG - MUSE: NORMAL
P AXIS - MUSE: NORMAL DEGREES
POTASSIUM SERPL-SCNC: 3.9 MMOL/L (ref 3.4–5.3)
PR INTERVAL - MUSE: NORMAL MS
QRS DURATION - MUSE: 94 MS
QT - MUSE: 314 MS
QTC - MUSE: 474 MS
R AXIS - MUSE: -39 DEGREES
SODIUM SERPL-SCNC: 137 MMOL/L (ref 135–145)
SYSTOLIC BLOOD PRESSURE - MUSE: NORMAL MMHG
T AXIS - MUSE: 52 DEGREES
VENTRICULAR RATE- MUSE: 137 BPM

## 2024-04-29 PROCEDURE — 120N000004 HC R&B MS OVERFLOW

## 2024-04-29 PROCEDURE — 250N000011 HC RX IP 250 OP 636: Mod: JZ | Performed by: INTERNAL MEDICINE

## 2024-04-29 PROCEDURE — 250N000011 HC RX IP 250 OP 636: Performed by: HOSPITALIST

## 2024-04-29 PROCEDURE — 99233 SBSQ HOSP IP/OBS HIGH 50: CPT | Performed by: INTERNAL MEDICINE

## 2024-04-29 PROCEDURE — 99232 SBSQ HOSP IP/OBS MODERATE 35: CPT | Performed by: INTERNAL MEDICINE

## 2024-04-29 PROCEDURE — 250N000013 HC RX MED GY IP 250 OP 250 PS 637: Performed by: INTERNAL MEDICINE

## 2024-04-29 PROCEDURE — 80048 BASIC METABOLIC PNL TOTAL CA: CPT | Performed by: STUDENT IN AN ORGANIZED HEALTH CARE EDUCATION/TRAINING PROGRAM

## 2024-04-29 PROCEDURE — 36415 COLL VENOUS BLD VENIPUNCTURE: CPT | Performed by: STUDENT IN AN ORGANIZED HEALTH CARE EDUCATION/TRAINING PROGRAM

## 2024-04-29 PROCEDURE — 99232 SBSQ HOSP IP/OBS MODERATE 35: CPT | Performed by: STUDENT IN AN ORGANIZED HEALTH CARE EDUCATION/TRAINING PROGRAM

## 2024-04-29 RX ORDER — LISINOPRIL 2.5 MG/1
2.5 TABLET ORAL DAILY
Status: DISCONTINUED | OUTPATIENT
Start: 2024-04-29 | End: 2024-04-30 | Stop reason: HOSPADM

## 2024-04-29 RX ORDER — METOPROLOL SUCCINATE 25 MG/1
25 TABLET, EXTENDED RELEASE ORAL DAILY
Status: DISCONTINUED | OUTPATIENT
Start: 2024-04-29 | End: 2024-04-30

## 2024-04-29 RX ORDER — SPIRONOLACTONE 25 MG
12.5 TABLET ORAL DAILY
Status: DISCONTINUED | OUTPATIENT
Start: 2024-04-29 | End: 2024-04-30 | Stop reason: HOSPADM

## 2024-04-29 RX ORDER — DOXYCYCLINE 100 MG/1
100 CAPSULE ORAL EVERY 12 HOURS SCHEDULED
Status: DISCONTINUED | OUTPATIENT
Start: 2024-04-29 | End: 2024-04-30 | Stop reason: HOSPADM

## 2024-04-29 RX ORDER — DIGOXIN 0.25 MG/ML
250 INJECTION INTRAMUSCULAR; INTRAVENOUS EVERY 6 HOURS
Qty: 8 ML | Refills: 0 | Status: COMPLETED | OUTPATIENT
Start: 2024-04-29 | End: 2024-04-30

## 2024-04-29 RX ADMIN — DILTIAZEM HYDROCHLORIDE 60 MG: 30 TABLET, FILM COATED ORAL at 08:30

## 2024-04-29 RX ADMIN — DIGOXIN 250 MCG: 0.25 INJECTION INTRAMUSCULAR; INTRAVENOUS at 21:01

## 2024-04-29 RX ADMIN — APIXABAN 5 MG: 5 TABLET, FILM COATED ORAL at 08:30

## 2024-04-29 RX ADMIN — DIGOXIN 250 MCG: 0.25 INJECTION INTRAMUSCULAR; INTRAVENOUS at 16:01

## 2024-04-29 RX ADMIN — LISINOPRIL 2.5 MG: 2.5 TABLET ORAL at 10:35

## 2024-04-29 RX ADMIN — APIXABAN 5 MG: 5 TABLET, FILM COATED ORAL at 21:01

## 2024-04-29 RX ADMIN — DOXYCYCLINE 100 MG: 100 INJECTION, POWDER, LYOPHILIZED, FOR SOLUTION INTRAVENOUS at 10:24

## 2024-04-29 RX ADMIN — SPIRONOLACTONE 12.5 MG: 25 TABLET ORAL at 11:55

## 2024-04-29 RX ADMIN — DIGOXIN 250 MCG: 0.25 INJECTION INTRAMUSCULAR; INTRAVENOUS at 10:33

## 2024-04-29 RX ADMIN — DOXYCYCLINE HYCLATE 100 MG: 100 CAPSULE ORAL at 21:01

## 2024-04-29 RX ADMIN — METOPROLOL SUCCINATE 25 MG: 25 TABLET, EXTENDED RELEASE ORAL at 10:37

## 2024-04-29 ASSESSMENT — ACTIVITIES OF DAILY LIVING (ADL)
ADLS_ACUITY_SCORE: 22

## 2024-04-29 NOTE — PROGRESS NOTES
"[facility] Inpatient follow up       Patient:  Omar Perez  Date of birth 1950, Medical record number [id number]  Date of Visit:  2024  Attending Physician: Lamar Gorman MD         Assessment and Recommendations:   Assessment:  Omar Perez is a 73 year old male with   Lives by [facility].  Worked several hours outdoors planting trees.  Recent travel to [place].  Febrile illness of unclear etiology.  Associated mild leukocytosis.  Blood cultures no growth.  On doxycycline.  Tickborne illness workup so far negative, only await Rickettsia testing (and coxsackie).  Feels 95% better.  Diffuse skin rash.    New cardiomyopathy with EF of 39% with high troponin.  Stress-induced cardiomyopathy on the differential diagnosis.    Recommendations:  Continue doxycycline 10 days given the significant improvement noted after initiation of therapy of this antibiotic. Last day of doxycycline 24.  Follow-up pending coxsackie A&B serology and Rickettsia testing.    ID plan in place. I will sign off. Please call if questions.     Davide Gunn MD   [facility].   [facility]  Office Telephone [phone].  Fax [phone]  [facility] paging            Interval History:     HPI:  feels better. Recalls feeling better when the initial dose of doxycycline was infused. Still with rapid heart rate.     Pertinent cultures include:  No results found for: \"CULT\"    Recent Inflammatory Biomarkers:   Recent Labs   Lab Test 24  0705 24  0630 24  1327 24  1320 22  1655   PCAL  --  0.99*  --   --   --   --    WBC 11.6* 11.9* 13.7* 9.7 5.4 5.8            Review of Systems:   CONSTITUTIONAL:    Temp Max: Temp (24hrs), Av.9  F (36.6  C), Min:97.5  F (36.4  C), Max:98.6  F (37  C)   .  Negative except for findings in the HPI.           Current Medications (antimicrobials listed in bold):     Current " Facility-Administered Medications   Medication Dose Route Frequency Provider Last Rate Last Admin    apixaban ANTICOAGULANT (ELIQUIS) tablet 5 mg  5 mg Oral BID Rah Agosto MD   5 mg at 04/29/24 0830    digoxin (LANOXIN) injection 250 mcg  250 mcg Intravenous Q6H Amalia Waddell MD   250 mcg at 04/29/24 1033    doxycycline (VIBRAMYCIN) 100 mg vial to attach to  mL bag  100 mg Intravenous Q12H Tristian Pereira DO   100 mg at 04/29/24 1024    lisinopril (ZESTRIL) tablet 2.5 mg  2.5 mg Oral Daily Amalia Waddell MD   2.5 mg at 04/29/24 1035    metoprolol succinate ER (TOPROL XL) 24 hr tablet 25 mg  25 mg Oral Daily Amalia Waddell MD   25 mg at 04/29/24 1037    ondansetron (ZOFRAN) injection 4 mg  4 mg Intravenous Once Breanne Quintero MD        spironolactone (ALDACTONE) half-tab 12.5 mg  12.5 mg Oral Daily Amalia Waddell MD                  Allergies:   No Known Allergies         Physical Exam:   Vitals were reviewed  Patient Vitals for the past 24 hrs:   BP Temp Temp src Pulse Resp SpO2 Weight   04/29/24 0800 112/69 97.5  F (36.4  C) Oral (!) 125 16 95 % --   04/29/24 0645 99/55 -- -- 103 -- -- --   04/29/24 0345 108/70 98.8  F (37.1  C) Oral -- 16 -- --   04/29/24 0240 -- -- -- -- -- -- 103.2 kg (227 lb 8 oz)   04/29/24 0000 -- -- -- 90 -- -- --   04/28/24 2344 123/82 98.8  F (37.1  C) Oral 103 16 96 % --   04/28/24 1915 121/68 98.6  F (37  C) Oral 101 18 95 % --   04/28/24 1820 117/74 -- -- -- -- -- --   04/28/24 1817 117/74 -- -- -- -- -- --   04/28/24 1532 125/77 97.6  F (36.4  C) Oral 111 20 93 % --   04/28/24 1243 111/72 -- -- 95 -- -- --   04/28/24 1139 103/59 97.8  F (36.6  C) Oral 95 18 94 % --       Physical Examination:  Gen: Pleasant in no acute distress.  HEENT: NCAT. EOMI. PERRL.  Neck: No bruit, JVD or thyromegaly.  Lungs: Clear to ascultation bilat with no crackles or wheezes.  Card: RRR. NSR. No RMG. Peripheral pulses present and symmetric. No edema.  Abd: Soft NT ND. No mass. Normal bowel  sounds.  Skin: Diffuse erythematous papular rash.  Extr: No edema.  Neuro: Alert and oriented to place time and person.            Laboratory Data:   ID Labs:  Microbiology labs:  Lyme Ab negative  Anaplasma smear and PCR negative  Babesia negative PCR    Rickettsia pending    No lab results found.  Recent Labs   Lab Test 04/27/24  0705 04/26/24  0630 04/25/24 2010 04/24/24  1327 04/11/24  1320 01/05/22  1655   WBC 11.6* 11.9* 13.7* 9.7 5.4 5.8     Recent Labs   Lab Test 04/29/24  0938 04/28/24  0759 04/27/24  0705 04/26/24  0630   CR 0.90 0.82 1.08 1.12   GFRESTIMATED 90 >90 72 69       Hematology Studies  Recent Labs   Lab Test 04/27/24  0705 04/26/24  0630 04/25/24 2010 04/24/24  1327 04/11/24  1320 01/05/22  1655   WBC 11.6* 11.9* 13.7* 9.7 5.4 5.8   HGB 13.4 13.2* 14.3 14.7 15.1 14.6   HCT 38.9* 38.8* 41.3 42.3 45.5 44.8    180 201 207 221 240       Metabolic  Recent Labs   Lab Test 04/29/24  0938 04/28/24  0759 04/27/24  0705    133* 133*   BUN 16.2 18.5 18.5   CO2 23 16* 25   CR 0.90 0.82 1.08   GFRESTIMATED 90 >90 72       Hepatic Studies  Recent Labs   Lab Test 04/26/24  0630 04/25/24 2010 04/24/24  1327   BILITOTAL 0.5 0.6 0.7   ALKPHOS 128 145 162*   ALBUMIN 3.2* 3.6 4.0   AST 27 34 35   ALT 24 31 36       ImmunologlobulinsNo lab results found.         Imaging Data:   Reviewed

## 2024-04-29 NOTE — PLAN OF CARE
VSS, tele running Afib , BP's maintaining above parameters to admin diltiazem. Asymptomatic. Skin rash beginning to get slightly itchy, but lighter in appearance. Wants to go home.    Goal Outcome Evaluation:  Problem: Risk for Delirium  Goal: Improved Behavioral Control  Intervention: Minimize Safety Risk     Problem: Dysrhythmia  Goal: Normalized Cardiac Rhythm  Outcome: Progressing

## 2024-04-29 NOTE — PROGRESS NOTES
[facility] MEDICINE  PROGRESS NOTE       Securely message me with Erasto (more info)    Code Status: Full Code       Identification/Summary:   Omar Perez is a 73 year old male with PMHx significant for dilated thoracic aorta, BPH who was admitted on 4/25/2024. He presented with generalized weakness, nausea, and fevers.  Test for atypical infection worsened.  Infectious disease were consulted.  Empiric doxycycline and ceftriaxone were started. Found to have new low EF of 39% and troponin of 363. Cardiology consulted.  Developed A-fib with RVR.  Metoprolol is titrated and Eliquis is started.         Barriers to Discharge: Afib with RVR medicine titration, possible discharge tomorrow     Disposition: Inpatient     Assessment and Plan:  Acute febrile illness and rash  Presenting with intermittent fevers up to 103 F, persistent nausea without vomiting, and generalized weakness over the past week with grossly negative workup and unclear etiology. Papular rash over his back and chest abdomen.  Pro-Reinaldo is 0.99.   - s/p IV F  -4/25 start doxycycline 100 mg IV q12h to cover for potential tickborne illness, ID added ceftriaxone on 4/26  - Infectious Disease consult  - Follow pending blood tests (Ehrlichia, Lyme -, Babesia, respiratory panel-), added Rickettsia IgG and IgM, ID added TTE, PSA -. Added HIV-, hepatitis panel -, Coxsackie A/B  - Follow blood cultures  - Per ID, Continue doxycycline 10 days given the significant improvement noted after initiation of therapy of this antibiotic. Last day of doxycycline 5/4/24.     Afib with RVR  Troponin elevation   New on 4/27. Possible related viral myocarditis.  TSH is normal.  LDL is 64.  Hemoglobin A1c 5.8.  - continue monitor trop, down trending  -Started on metoprolol 25 twice bid > 50 q6h; was switched to dilt by cardiology briefly then back to metoprolol and digoxin on 4/29  -Heparin drip per cardiology > Eliquis  - Check Magnesium  "normal     Acute kidney injury -improving  Dehydration  Metabolic acidosis on 4/28 - resolved  Cr 1.42 on arrival, down slightly from 1.50 the day prior. Likely due to dehydration and poor oral intake.  - IV fluids as above, stopped  - Avoid nephrotoxic agents     Dilation of ascending thoracic aorta, chronic  Stable at 4.1 cm per CT Chest from 4/24/2024.     Benign prostatic hyperplasia  Not on any medications for this. No new urinary symptoms.     Urine protein  Follow-up as an outpatient     Hyponatremia  Improved now that appetite is better  Monitor     Anticoagulation   Orders (Includes Only Anticoagulants)  apixaban ANTICOAGULANT, 5 mg, Oral, BID      Therapy: none  [place]:Not present  Lines: None       Current Diet  Orders Placed This Encounter      Regular Diet Adult        Clinically Significant Risk Factors              # Hypoalbuminemia: Lowest albumin = 3.2 g/dL at 4/26/2024  6:30 AM, will monitor as appropriate            # Overweight: Estimated body mass index is 28.44 kg/m  as calculated from the following:    Height as of this encounter: 1.905 m (6' 3\").    Weight as of this encounter: 103.2 kg (227 lb 8 oz)., PRESENT ON ADMISSION            Interval History/Subjective:  He feels about the same and overall improved since admission.  He says he could work on better hydration.  He denies chest pain, palpitation, dyspnea.      Last 24H PRN:     calcium carbonate (TUMS) chewable tablet 1,000 mg, 1,000 mg at 04/28/24 9583    Physical Exam/Objective:  Temp:  [97.5  F (36.4  C)-98.8  F (37.1  C)] 97.5  F (36.4  C)  Pulse:  [] 97  Resp:  [16-20] 16  BP: ()/(55-82) 97/60  SpO2:  [93 %-96 %] 96 %  Wt Readings from Last 4 Encounters:   04/29/24 103.2 kg (227 lb 8 oz)   04/24/24 101.6 kg (224 lb)   04/24/24 102.5 kg (226 lb)   04/11/24 103.4 kg (228 lb)     Body mass index is 28.44 kg/m .    General Appearance: Alert and wake, not in distress  Neurology: oriented x 3  Psych: cooperative and calm, " normal affect    Medications:   Personally Reviewed.  Medications   Current Facility-Administered Medications   Medication Dose Route Frequency Provider Last Rate Last Admin     Current Facility-Administered Medications   Medication Dose Route Frequency Provider Last Rate Last Admin    apixaban ANTICOAGULANT (ELIQUIS) tablet 5 mg  5 mg Oral BID Rah Agosto MD   5 mg at 04/29/24 0830    digoxin (LANOXIN) injection 250 mcg  250 mcg Intravenous Q6H Amalia Waddell MD   250 mcg at 04/29/24 1033    doxycycline hyclate (VIBRAMYCIN) capsule 100 mg  100 mg Oral Q12H [facility] (08/20) Davide Gunn MD        lisinopril (ZESTRIL) tablet 2.5 mg  2.5 mg Oral Daily Amalia Waddell MD   2.5 mg at 04/29/24 1035    metoprolol succinate ER (TOPROL XL) 24 hr tablet 25 mg  25 mg Oral Daily Amalia Waddell MD   25 mg at 04/29/24 1037    ondansetron (ZOFRAN) injection 4 mg  4 mg Intravenous Once Breanne Quintero MD        spironolactone (ALDACTONE) half-tab 12.5 mg  12.5 mg Oral Daily Amalia Waddell MD   12.5 mg at 04/29/24 1155       Data reviewed today: I personally reviewed all new medications, labs, imaging/diagnostics reports over the past 24 hours. Pertinent findings include:    Imaging:   No results found for this or any previous visit (from the past 24 hour(s)).    Labs:  Echocardiogram Complete   Final Result        Recent Results (from the past 24 hour(s))   Basic metabolic panel    Collection Time: 04/29/24  9:38 AM   Result Value Ref Range    Sodium 137 135 - 145 mmol/L    Potassium 3.9 3.4 - 5.3 mmol/L    Chloride 104 98 - 107 mmol/L    Carbon Dioxide (CO2) 23 22 - 29 mmol/L    Anion Gap 10 7 - 15 mmol/L    Urea Nitrogen 16.2 8.0 - 23.0 mg/dL    Creatinine 0.90 0.67 - 1.17 mg/dL    GFR Estimate 90 >60 mL/min/1.73m2    Calcium 8.5 (L) 8.8 - 10.2 mg/dL    Glucose 136 (H) 70 - 99 mg/dL   Extra Purple Top Tube    Collection Time: 04/29/24  9:38 AM   Result Value Ref Range    Hold Specimen JIC        Pending Labs:  Unresulted Labs  Ordered in the Past 30 Days of this Admission       Date and Time Order Name Status Description    4/27/2024  4:27 PM Coxsackie A Antibodies (Serotypes 2,4,7,9,10,16), Serum In process     4/27/2024  4:27 PM Coxsackie B virus antibodies In process     4/27/2024  7:38 AM Creatine Kinase Isoenzymes In process     4/25/2024  9:40 PM Rickettsia rickettsii antibody IgG & IgM In process     4/25/2024  8:34 PM Blood Culture Peripheral Blood Preliminary     4/25/2024  8:34 PM Blood Culture Peripheral Blood Preliminary     4/24/2024 12:54 PM Blood Culture Peripheral Blood Preliminary     4/24/2024 12:54 PM Blood Culture Peripheral Blood Preliminary           STEPHAN HOWE MD  [facility]  [facility]  Phone: #[phone]    Securely message me with Erasto (more info)

## 2024-04-29 NOTE — PLAN OF CARE
Goal Outcome Evaluation:      Plan of Care Reviewed With: patient    Reviewed with patient cardiologist adjusting medications for optimal heart rate control for his new afib.  Patient expressed many concerns. Reassured him that at the point of discharge we will have a clear medication plan, follow up wit afib clinic and cardiologist to monitor heart rate and cardiac improvement.  Reviewed afib handout with patient. Discussed low consumption of alcohol, caffeine and sodium in diet to support heart health.

## 2024-04-29 NOTE — CONSULTS
SPIRITUAL HEALTH SERVICES ([facility]) [address]  [facility].  [address]     Saw pt Omar Perez per consult. Thank you for the referral. .     Patient/Family Understanding of Illness and Goals of Care - Mr Gil is grappling with his hospitalization. He understands the reasons he was admitted and his diagnosis. He is trying to understand the long term implications of atrial fibrillation for his life and his half-way.      Distress and Loss - He is most concerned about returning to his baseline of health which included skiing in the [place] and completing long distance open water swims in [facility].  He has worked very hard as a realtor and had detailed plans for his half-way that may have to be readjusted if not scrapped. He is also living with the loss that comes with having an alcoholic daughter.      Strengths, Coping, and Resources - His family (wife, daughter and her children) is a source of support for and to him.      Meaning, Beliefs, and Spirituality - Mr Perez is not affiliated with any formal Synagogue.He is a high achiever, a realtor,  and measures success in terms of family and real estate he owns      Plan of Care - [facility] will follow, as able, during LOS.        Vielka Simon, Ph.D.,[facility]  , Spiritual Health Services      [facility] available 24/7 for emergency requests/referrals, either by having the on-call  paged or by entering an ASAP/STAT consult in Epic (this will also page the on-call ).

## 2024-04-29 NOTE — PROGRESS NOTES
Assessment/Plan:  1.  New onset atrial fibrillation with rapid ventricular response: The patient got viral infection recently.  His echocardiogram was reported LVEF 39%.  It is not quite sure if his atrial fibrillation was caused by viral infection versus reduced LV systolic function.  Discussed evaluation and management of atrial fibrillation with RVR.  Continue to titrate his medications for rate control and CHF.  His ventricular rate still not well-controlled even he was taking diltiazem 60 mg every 6 hours.  Discontinue diltiazem 60 mg every 6 hours due to reduced LV systolic function to 39%.  Start metoprolol succinate 25 mg daily, digoxin loading dose 0.25 mg every 6 hours for total 1 mg over 24 hours.  Continue Eliquis for prevention of stroke.  2.  Cardiomyopathy, LVEF 39%: The patient has no obvious of fluid retention.  Did not start diuresis.  Continue to monitor.  Give diuresis as needed.  As mentioned above, discontinue diltiazem, start the metoprolol succinate and digoxin, add spironolactone 25 mg daily, monitor potassium level.  Add lisinopril 2.5 mg daily.  Cardiac MRI as outpatient for evaluation of the etiology of her cardiomyopathy.  His chest CT was reported mild coronary calcification.  The patient's medical presentation is not consistent with significant coronary artery disease.  His LDL was 64.    Subjective:  Interval History:  Has no complaints of chest pain.  Improved shortness of breath and palpitations.  He feels better.    Current Medications:  Scheduled Meds:  Current Facility-Administered Medications   Medication Dose Route Frequency Provider Last Rate Last Admin    apixaban ANTICOAGULANT (ELIQUIS) tablet 5 mg  5 mg Oral BID Rah Agosto MD   5 mg at 04/29/24 0830    diltiazem (CARDIZEM) tablet 60 mg  60 mg Oral Q6H [facility] Rah Agosto MD   60 mg at 04/29/24 0830    doxycycline (VIBRAMYCIN) 100 mg vial to attach to  mL bag  100 mg Intravenous Q12H Tristian Pereira DO    100 mg at 04/28/24 2155    ondansetron (ZOFRAN) injection 4 mg  4 mg Intravenous Once Breanne Quintero MD         Continuous Infusions:  Current Facility-Administered Medications   Medication Dose Route Frequency Provider Last Rate Last Admin     PRN Meds:.  Current Facility-Administered Medications   Medication Dose Route Frequency Provider Last Rate Last Admin    acetaminophen (TYLENOL) tablet 975 mg  975 mg Oral Q8H PRN Tristian Pereira, DO   975 mg at 04/27/24 2011    calcium carbonate (TUMS) chewable tablet 1,000 mg  1,000 mg Oral TID PRN Tristian Pereira, DO   1,000 mg at 04/28/24 2153    docusate sodium (COLACE) capsule 100 mg  100 mg Oral BID PRN Tristian Pereira DO        melatonin tablet 1 mg  1 mg Oral At Bedtime PRN Tristian Pereira, DO   1 mg at 04/26/24 2239    ondansetron (ZOFRAN ODT) ODT tab 4 mg  4 mg Oral Q6H PRN Tristian Pereira DO        Or    ondansetron (ZOFRAN) injection 4 mg  4 mg Intravenous Q6H PRN Tristian Pereira DO        prochlorperazine (COMPAZINE) injection 5 mg  5 mg Intravenous Q6H PRN Tristian Pereira DO        Or    prochlorperazine (COMPAZINE) tablet 5 mg  5 mg Oral Q6H PRN Tristian Pereira,         Or    prochlorperazine (COMPAZINE) suppository 12.5 mg  12.5 mg Rectal Q12H PRN Tristian Pereira DO        senna-docusate (SENOKOT-S/PERICOLACE) 8.6-50 MG per tablet 1 tablet  1 tablet Oral BID PRN Tristian Pereira DO        Or    senna-docusate (SENOKOT-S/PERICOLACE) 8.6-50 MG per tablet 2 tablet  2 tablet Oral BID PRN Tristian Pereira DO           Objective:   Vital signs in last 24 hours:  Temp:  [97.5  F (36.4  C)-98.8  F (37.1  C)] 97.5  F (36.4  C)  Pulse:  [] 125  Resp:  [16-20] 16  BP: ()/(55-82) 112/69  SpO2:  [93 %-96 %] 95 %  Weight:   [unfilled]     Physical Exam:  General Appearance:   Awake, Alert, No acute distress.   HEENT:  No scleral icterus; the mucous membranes were moist.   Neck: No cervical bruits. No jugular venous distention   Lungs:   Few crackles in bases.  "No wheezing.   Cardiovascular:   IRRR, normal first and second heart sounds with no murmurs. No rubs or gallops.     Abdomen:  Soft. No tenderness. Bowels sounds are present   Extremities: No leg edema. Equal posterior tibial pulsese.   Skin: Warm, Dry. No rashes.   Neurologic: Mood and affect are appropriate. No focal deficits.         Cardiographics:   Report: personally reviewed. .      Tele monitoring -atrial fibrillation with RVR, ventricular rate 100 to 120 bpm    Echocardiogram April 26, 2024:  1. Biplane LVEF is 39%. There is mild-moderate global hypokinesia of the left  ventricle.  2. Normal right ventricle size and systolic function.  3. No hemodynamically significant valvular abnormalities on 2D or color flow  imaging.  4. Aortic root dilatation is present, measuring 41 mm.  5. No evidence of endocarditis on this study  6. There is no comparison study available.      Lab Results:   personally reviewed.     Lab Results   Component Value Date     04/28/2024    CO2 16 04/28/2024    CO2 29 01/05/2022    BUN 18.5 04/28/2024    BUN 18 01/05/2022     No results found for: \"CKTOTAL\", \"CKMB\", \"TROPONINI\"  Lab Results   Component Value Date    WBC 11.6 04/27/2024    HGB 13.4 04/27/2024    HCT 38.9 04/27/2024    MCV 87 04/27/2024     04/27/2024     Lab Results   Component Value Date    CHOL 114 04/27/2024    TRIG 144 04/27/2024    HDL 21 04/27/2024    LDL 64 04/27/2024           "

## 2024-04-30 ENCOUNTER — TELEPHONE (OUTPATIENT)
Dept: INTERNAL MEDICINE | Facility: CLINIC | Age: 74
End: 2024-04-30

## 2024-04-30 VITALS
SYSTOLIC BLOOD PRESSURE: 120 MMHG | HEIGHT: 75 IN | HEART RATE: 91 BPM | BODY MASS INDEX: 28.1 KG/M2 | WEIGHT: 226 LBS | RESPIRATION RATE: 18 BRPM | DIASTOLIC BLOOD PRESSURE: 82 MMHG | TEMPERATURE: 97.9 F | OXYGEN SATURATION: 95 %

## 2024-04-30 DIAGNOSIS — I48.19 PERSISTENT ATRIAL FIBRILLATION (H): ICD-10-CM

## 2024-04-30 DIAGNOSIS — I25.5 ISCHEMIC CARDIOMYOPATHY: Primary | ICD-10-CM

## 2024-04-30 LAB
ACANTHOCYTES BLD QL SMEAR: NORMAL
ANION GAP SERPL CALCULATED.3IONS-SCNC: 12 MMOL/L (ref 7–15)
AUER BODIES BLD QL SMEAR: NORMAL
BACTERIA BLD CULT: NO GROWTH
BACTERIA BLD CULT: NO GROWTH
BASO STIPL BLD QL SMEAR: NORMAL
BASOPHILS # BLD AUTO: 0.1 10E3/UL (ref 0–0.2)
BASOPHILS NFR BLD AUTO: 1 %
BITE CELLS BLD QL SMEAR: NORMAL
BLISTER CELLS BLD QL SMEAR: NORMAL
BUN SERPL-MCNC: 13.2 MG/DL (ref 8–23)
BURR CELLS BLD QL SMEAR: NORMAL
CALCIUM SERPL-MCNC: 8.9 MG/DL (ref 8.8–10.2)
CHLORIDE SERPL-SCNC: 105 MMOL/L (ref 98–107)
CREAT SERPL-MCNC: 0.94 MG/DL (ref 0.67–1.17)
DACRYOCYTES BLD QL SMEAR: NORMAL
DEPRECATED HCO3 PLAS-SCNC: 20 MMOL/L (ref 22–29)
DIGOXIN SERPL-MCNC: 0.8 NG/ML (ref 0.6–2)
EGFRCR SERPLBLD CKD-EPI 2021: 85 ML/MIN/1.73M2
ELLIPTOCYTES BLD QL SMEAR: NORMAL
EOSINOPHIL # BLD AUTO: 0.8 10E3/UL (ref 0–0.7)
EOSINOPHIL NFR BLD AUTO: 10 %
ERYTHROCYTE [DISTWIDTH] IN BLOOD BY AUTOMATED COUNT: 14.2 % (ref 10–15)
FRAGMENTS BLD QL SMEAR: NORMAL
GIANT PLATELETS BLD QL SMEAR: NORMAL
GLUCOSE SERPL-MCNC: 107 MG/DL (ref 70–99)
HCT VFR BLD AUTO: 36.2 % (ref 40–53)
HGB BLD-MCNC: 12.3 G/DL (ref 13.3–17.7)
HGB C CRYSTALS: NORMAL
HOWELL-JOLLY BOD BLD QL SMEAR: NORMAL
IMM GRANULOCYTES # BLD: 0.2 10E3/UL
IMM GRANULOCYTES NFR BLD: 2 %
LYMPHOCYTES # BLD AUTO: 2.3 10E3/UL (ref 0.8–5.3)
LYMPHOCYTES NFR BLD AUTO: 27 %
MCH RBC QN AUTO: 30.4 PG (ref 26.5–33)
MCHC RBC AUTO-ENTMCNC: 34 G/DL (ref 31.5–36.5)
MCV RBC AUTO: 89 FL (ref 78–100)
MONOCYTES # BLD AUTO: 0.7 10E3/UL (ref 0–1.3)
MONOCYTES NFR BLD AUTO: 8 %
NEUTROPHILS # BLD AUTO: 4.5 10E3/UL (ref 1.6–8.3)
NEUTROPHILS NFR BLD AUTO: 52 %
NEUTS HYPERSEG BLD QL SMEAR: NORMAL
NRBC # BLD AUTO: 0 10E3/UL
NRBC BLD AUTO-RTO: 0 /100
PATH REV: NORMAL
PLAT MORPH BLD: NORMAL
PLATELET # BLD AUTO: 348 10E3/UL (ref 150–450)
POLYCHROMASIA BLD QL SMEAR: NORMAL
POTASSIUM SERPL-SCNC: 4.2 MMOL/L (ref 3.4–5.3)
R RICKETTSI IGG TITR SER IF: NORMAL {TITER}
R RICKETTSI IGM TITR SER IF: NORMAL {TITER}
RBC # BLD AUTO: 4.05 10E6/UL (ref 4.4–5.9)
RBC AGGLUT BLD QL: NORMAL
RBC MORPH BLD: NORMAL
ROULEAUX BLD QL SMEAR: NORMAL
SICKLE CELLS BLD QL SMEAR: NORMAL
SMUDGE CELLS BLD QL SMEAR: NORMAL
SODIUM SERPL-SCNC: 137 MMOL/L (ref 135–145)
SPHEROCYTES BLD QL SMEAR: NORMAL
STOMATOCYTES BLD QL SMEAR: NORMAL
TARGETS BLD QL SMEAR: NORMAL
TOXIC GRANULES BLD QL SMEAR: NORMAL
VARIANT LYMPHS BLD QL SMEAR: NORMAL
WBC # BLD AUTO: 8.6 10E3/UL (ref 4–11)

## 2024-04-30 PROCEDURE — 99232 SBSQ HOSP IP/OBS MODERATE 35: CPT | Performed by: INTERNAL MEDICINE

## 2024-04-30 PROCEDURE — 80162 ASSAY OF DIGOXIN TOTAL: CPT | Performed by: INTERNAL MEDICINE

## 2024-04-30 PROCEDURE — 85025 COMPLETE CBC W/AUTO DIFF WBC: CPT | Performed by: STUDENT IN AN ORGANIZED HEALTH CARE EDUCATION/TRAINING PROGRAM

## 2024-04-30 PROCEDURE — 36415 COLL VENOUS BLD VENIPUNCTURE: CPT | Performed by: INTERNAL MEDICINE

## 2024-04-30 PROCEDURE — 250N000013 HC RX MED GY IP 250 OP 250 PS 637: Performed by: INTERNAL MEDICINE

## 2024-04-30 PROCEDURE — 250N000011 HC RX IP 250 OP 636: Mod: JZ | Performed by: INTERNAL MEDICINE

## 2024-04-30 PROCEDURE — 80048 BASIC METABOLIC PNL TOTAL CA: CPT | Performed by: STUDENT IN AN ORGANIZED HEALTH CARE EDUCATION/TRAINING PROGRAM

## 2024-04-30 PROCEDURE — 99239 HOSP IP/OBS DSCHRG MGMT >30: CPT | Performed by: INTERNAL MEDICINE

## 2024-04-30 PROCEDURE — 36415 COLL VENOUS BLD VENIPUNCTURE: CPT | Performed by: STUDENT IN AN ORGANIZED HEALTH CARE EDUCATION/TRAINING PROGRAM

## 2024-04-30 RX ORDER — SPIRONOLACTONE 25 MG/1
12.5 TABLET ORAL DAILY
Qty: 30 TABLET | Refills: 0 | Status: SHIPPED | OUTPATIENT
Start: 2024-04-30

## 2024-04-30 RX ORDER — METOPROLOL SUCCINATE 50 MG/1
50 TABLET, EXTENDED RELEASE ORAL DAILY
Qty: 30 TABLET | Refills: 0 | Status: SHIPPED | OUTPATIENT
Start: 2024-04-30 | End: 2024-05-29

## 2024-04-30 RX ORDER — LISINOPRIL 2.5 MG/1
2.5 TABLET ORAL DAILY
Qty: 30 TABLET | Refills: 0 | Status: SHIPPED | OUTPATIENT
Start: 2024-04-30 | End: 2024-05-29

## 2024-04-30 RX ORDER — DIGOXIN 125 MCG
125 TABLET ORAL DAILY
Status: DISCONTINUED | OUTPATIENT
Start: 2024-04-30 | End: 2024-04-30 | Stop reason: HOSPADM

## 2024-04-30 RX ORDER — DOXYCYCLINE 100 MG/1
100 CAPSULE ORAL EVERY 12 HOURS
Qty: 8 CAPSULE | Refills: 0 | Status: SHIPPED | OUTPATIENT
Start: 2024-04-30 | End: 2024-05-04

## 2024-04-30 RX ORDER — METOPROLOL SUCCINATE 50 MG/1
50 TABLET, EXTENDED RELEASE ORAL DAILY
Status: DISCONTINUED | OUTPATIENT
Start: 2024-05-01 | End: 2024-04-30 | Stop reason: HOSPADM

## 2024-04-30 RX ORDER — METOPROLOL SUCCINATE 25 MG/1
25 TABLET, EXTENDED RELEASE ORAL DAILY
Qty: 30 TABLET | Refills: 0 | Status: SHIPPED | OUTPATIENT
Start: 2024-04-30 | End: 2024-04-30

## 2024-04-30 RX ORDER — DIGOXIN 125 MCG
125 TABLET ORAL DAILY
Qty: 30 TABLET | Refills: 0 | Status: SHIPPED | OUTPATIENT
Start: 2024-04-30 | End: 2024-05-29

## 2024-04-30 RX ADMIN — DOXYCYCLINE HYCLATE 100 MG: 100 CAPSULE ORAL at 08:10

## 2024-04-30 RX ADMIN — DIGOXIN 250 MCG: 0.25 INJECTION INTRAMUSCULAR; INTRAVENOUS at 03:04

## 2024-04-30 RX ADMIN — METOPROLOL SUCCINATE 25 MG: 25 TABLET, EXTENDED RELEASE ORAL at 08:10

## 2024-04-30 RX ADMIN — APIXABAN 5 MG: 5 TABLET, FILM COATED ORAL at 08:10

## 2024-04-30 RX ADMIN — LISINOPRIL 2.5 MG: 2.5 TABLET ORAL at 08:10

## 2024-04-30 RX ADMIN — DIGOXIN 125 MCG: 125 TABLET ORAL at 12:21

## 2024-04-30 RX ADMIN — SPIRONOLACTONE 12.5 MG: 25 TABLET ORAL at 08:10

## 2024-04-30 ASSESSMENT — ACTIVITIES OF DAILY LIVING (ADL)
ADLS_ACUITY_SCORE: 22

## 2024-04-30 NOTE — PLAN OF CARE
Goal Outcome Evaluation:      Plan of Care Reviewed With: patient    Overall Patient Progress: no changeOverall Patient Progress: no change    Outcome Evaluation: Pt received in bed, AOx4, on RA, VSS. Denied pain. Afib rate 80-100s this shift. Pt on scheduled Metoprolol and IV Dig Q6h. Up independently to bathroom, voids ok. Rash noted to Abd and back. No other complaints, plan for possible discharge today.

## 2024-04-30 NOTE — PLAN OF CARE
Goal Outcome Evaluation:      Plan of Care Reviewed With: patient: AVS given and reviewed with patient. Discussed new meds, diet, activity, Event monitor, MD follow up and if/when to return to ER.  Patient able to state understanding.  Discharged home with wife for transport.

## 2024-04-30 NOTE — PROGRESS NOTES
Assessment/Plan:  1.  New onset atrial fibrillation with rapid ventricular response: The patient got viral infection recently.  His echocardiogram was reported LVEF 39%.  It is not quite sure if his atrial fibrillation was caused by viral infection versus reduced LV systolic function.  Discussed evaluation and management of atrial fibrillation with RVR.  Continue to titrate his medications for rate control and CHF.  His ventricular rate is better controlled after starting metoprolol and digoxin.  Increase metoprolol succinate to 50 mg daily for better rate control.  Check digoxin level, start oral digoxin 0.125 mg daily.   Continue Eliquis for prevention of stroke.  2.  Cardiomyopathy, LVEF 39%: The patient has no obvious of fluid retention.  Did not start diuresis.  Continue to monitor.  Give diuresis as needed.  As mentioned above, increased metoprolol succinate to 50 mg daily and continue digoxin 2.125 mg daily, continue spironolactone 12.5 mg daily, lisinopril 2.5 mg daily.  Stress cardiac MRI is ordered as outpatient.    The patient is able to be discharged to home today.  He is scheduled to follow-up with atrial fibrillation clinic in 10 days, follow-up with me in 2 months.    Subjective:  Interval History:  Has no complaints of chest pain.  Improved shortness of breath and palpitations.  He feels better.    Current Medications:  Scheduled Meds:  Current Facility-Administered Medications   Medication Dose Route Frequency Provider Last Rate Last Admin    apixaban ANTICOAGULANT (ELIQUIS) tablet 5 mg  5 mg Oral BID Rah Agosto MD   5 mg at 04/30/24 0810    digoxin (LANOXIN) tablet 125 mcg  125 mcg Oral Daily Amalia Waddell MD        doxycycline hyclate (VIBRAMYCIN) capsule 100 mg  100 mg Oral Q12H [facility] (08/20) Davide Gunn MD   100 mg at 04/30/24 0810    lisinopril (ZESTRIL) tablet 2.5 mg  2.5 mg Oral Daily Amalia Waddell MD   2.5 mg at 04/30/24 0810    [START ON 5/1/2024] metoprolol succinate ER  (TOPROL XL) 24 hr tablet 50 mg  50 mg Oral Daily Amalia Waddell MD        ondansetron (ZOFRAN) injection 4 mg  4 mg Intravenous Once Breanne Quintero MD        spironolactone (ALDACTONE) half-tab 12.5 mg  12.5 mg Oral Daily Amalia Waddell MD   12.5 mg at 04/30/24 0810     Continuous Infusions:  Current Facility-Administered Medications   Medication Dose Route Frequency Provider Last Rate Last Admin     PRN Meds:.  Current Facility-Administered Medications   Medication Dose Route Frequency Provider Last Rate Last Admin    acetaminophen (TYLENOL) tablet 975 mg  975 mg Oral Q8H PRN Tristian Pereira, DO   975 mg at 04/27/24 2011    calcium carbonate (TUMS) chewable tablet 1,000 mg  1,000 mg Oral TID PRN Tristian Pereira DO   1,000 mg at 04/28/24 2153    docusate sodium (COLACE) capsule 100 mg  100 mg Oral BID PRN Tristian Pereira DO        melatonin tablet 1 mg  1 mg Oral At Bedtime PRN Tristian Pereira DO   1 mg at 04/26/24 2239    ondansetron (ZOFRAN ODT) ODT tab 4 mg  4 mg Oral Q6H PRN Tristian Pereira DO        Or    ondansetron (ZOFRAN) injection 4 mg  4 mg Intravenous Q6H PRN Tristian Pereira DO        prochlorperazine (COMPAZINE) injection 5 mg  5 mg Intravenous Q6H PRN Tristian Pereira DO        Or    prochlorperazine (COMPAZINE) tablet 5 mg  5 mg Oral Q6H PRN Tristian Pereira DO        Or    prochlorperazine (COMPAZINE) suppository 12.5 mg  12.5 mg Rectal Q12H PRN Tristian Pereira DO        senna-docusate (SENOKOT-S/PERICOLACE) 8.6-50 MG per tablet 1 tablet  1 tablet Oral BID PRN Tristian Pereira DO        Or    senna-docusate (SENOKOT-S/PERICOLACE) 8.6-50 MG per tablet 2 tablet  2 tablet Oral BID PRN Tristian Pereira DO           Objective:   Vital signs in last 24 hours:  Temp:  [97.4  F (36.3  C)-98.1  F (36.7  C)] 97.9  F (36.6  C)  Pulse:  [] 91  Resp:  [16-20] 18  BP: ()/(60-82) 120/82  SpO2:  [95 %-97 %] 95 %  Weight:   [unfilled]     Physical Exam:  General Appearance:   Awake, Alert, No acute distress.  "  HEENT:  No scleral icterus; the mucous membranes were moist.   Neck: No cervical bruits. No jugular venous distention   Lungs:   Few crackles in bases. No wheezing.   Cardiovascular:   IRRR, normal first and second heart sounds with no murmurs. No rubs or gallops.     Abdomen:  Soft. No tenderness. Bowels sounds are present   Extremities: No leg edema. Equal posterior tibial pulsese.   Skin: Warm, Dry. No rashes.   Neurologic: Mood and affect are appropriate. No focal deficits.         Cardiographics:   Report: personally reviewed. .      Tele monitoring -atrial fibrillation with RVR, ventricular rate  bpm    Echocardiogram April 26, 2024:  1. Biplane LVEF is 39%. There is mild-moderate global hypokinesia of the left  ventricle.  2. Normal right ventricle size and systolic function.  3. No hemodynamically significant valvular abnormalities on 2D or color flow  imaging.  4. Aortic root dilatation is present, measuring 41 mm.  5. No evidence of endocarditis on this study  6. There is no comparison study available.      Lab Results:   personally reviewed.     Lab Results   Component Value Date     04/28/2024    CO2 16 04/28/2024    CO2 29 01/05/2022    BUN 18.5 04/28/2024    BUN 18 01/05/2022     No results found for: \"CKTOTAL\", \"CKMB\", \"TROPONINI\"  Lab Results   Component Value Date    WBC 11.6 04/27/2024    HGB 13.4 04/27/2024    HCT 38.9 04/27/2024    MCV 87 04/27/2024     04/27/2024     Lab Results   Component Value Date    CHOL 114 04/27/2024    TRIG 144 04/27/2024    HDL 21 04/27/2024    LDL 64 04/27/2024           "

## 2024-04-30 NOTE — DISCHARGE SUMMARY
"[facility]  Hospitalist Discharge Summary      Date of Admission:  4/25/2024  Date of Discharge:  4/30/2024  Discharging Provider: Kip Echeverria MD  Discharge Service: Hospitalist Service    Discharge Diagnoses       Clinically Significant Risk Factors     # Overweight: Estimated body mass index is 28.25 kg/m  as calculated from the following:    Height as of this encounter: 1.905 m (6' 3\").    Weight as of this encounter: 102.5 kg (226 lb).       Follow-ups Needed After Discharge   Follow-up Appointments     Follow-up and recommended labs and tests       Follow up with primary care provider, PERICO HERRERA, within 7 days for   hospital follow- up.  No follow up labs or test are needed. Will need   refills on meds.  Follow up with cardiology as scheduled.            Unresulted Labs Ordered in the Past 30 Days of this Admission       Date and Time Order Name Status Description    4/27/2024  4:27 PM Coxsackie A Antibodies (Serotypes 2,4,7,9,10,16), Serum In process     4/27/2024  4:27 PM Coxsackie B virus antibodies In process     4/25/2024  9:40 PM Rickettsia rickettsii antibody IgG & IgM In process     4/25/2024  8:34 PM Blood Culture Peripheral Blood Preliminary     4/25/2024  8:34 PM Blood Culture Peripheral Blood Preliminary         These results will be followed up by PCP.    Discharge Disposition   Discharged to home  Condition at discharge: Stable    Hospital Course      Omar Perez is a 73 year old male with PMHx significant for dilated thoracic aorta, BPH who was admitted on 4/25/2024. He presented with generalized weakness, nausea, and fevers.  Test for atypical infection worsened.  Infectious disease were consulted.  Empiric doxycycline and ceftriaxone were started. Found to have new low EF of 39% and troponin of 363. Cardiology consulted.  Developed A-fib with RVR.  Metoprolol is titrated and Eliquis is started.         Barriers to Discharge: Afib with RVR medicine " titration, possible discharge tomorrow     Disposition: Inpatient     Assessment and Plan:  Acute febrile illness and rash  Presenting with intermittent fevers up to 103 F, persistent nausea without vomiting, and generalized weakness over the past week with grossly negative workup and unclear etiology. Papular rash over his back and chest abdomen.  Pro-Reinaldo is 0.99.   - s/p IV F  -4/25 start doxycycline 100 mg IV q12h to cover for potential tickborne illness, ID added ceftriaxone on 4/26  - Infectious Disease consult  - Follow pending blood tests (Ehrlichia, Lyme -, Babesia, respiratory panel-), added Rickettsia IgG and IgM, ID added TTE, PSA -. Added HIV-, hepatitis panel -, Coxsackie A/B  - Follow blood cultures  - Per ID, Continue doxycycline 10 days given the significant improvement noted after initiation of therapy of this antibiotic. Last day of doxycycline 5/4/24.     Afib with RVR  Troponin elevation   New on 4/27. Possible related viral myocarditis.  TSH is normal.  LDL is 64.  Hemoglobin A1c 5.8.  - continue monitor trop, down trending  -Started on metoprolol 25 twice bid > 50 q6h; was switched to dilt by cardiology briefly then back to metoprolol and digoxin on 4/29  -Heparin drip per cardiology > Eliquis  - Check Magnesium normal     Acute kidney injury -improving  Dehydration  Metabolic acidosis on 4/28 - resolved  Cr 1.42 on arrival, down slightly from 1.50 the day prior. Likely due to dehydration and poor oral intake.  - IV fluids as above, stopped  - Avoid nephrotoxic agents     Dilation of ascending thoracic aorta, chronic  Stable at 4.1 cm per CT Chest from 4/24/2024.     Benign prostatic hyperplasia  Not on any medications for this. No new urinary symptoms.     Urine protein  Follow-up as an outpatient     Hyponatremia  Improved now that appetite is better  Monitor    Afebrile and not hypoxic. Likely tick borne illness with response to doxy and rash which is receding. A fib with RVR. Will discharge  on zio patch and follow up with PCP and cardiology.      Consultations This Hospital Stay   INFECTIOUS DISEASES IP CONSULT  CARDIOLOGY IP CONSULT  PHARMACY IP CONSULT  PHARMACY IP CONSULT  SPIRITUAL HEALTH SERVICES IP CONSULT    Code Status   Full Code    Time Spent on this Encounter   I, Kip Echeverria MD, personally saw the patient today and spent greater than 30 minutes discharging this patient.       Kip Echeverria MD  [facility]ART [facility]  Critical access hospital[address] [facility] [address]  Phone: [phone]  Fax: [phone]  ______________________________________________________________________    Physical Exam   Vital Signs: Temp: 97.4  F (36.3  C) Temp src: Oral BP: 116/79 Pulse: 101   Resp: 18 SpO2: 96 % O2 Device: None (Room air)    Weight: 226 lbs 0 oz    Gen - AAO x 3 in NAD.  Lungs - CTA B.  Heart - irregularly irregular, S1+S2 nml, no m/g/r.  Abd - soft, NT, ND, + BS.  Ext - no edema.       Primary Care Physician   PERICO HERRERA    Discharge Orders      Follow-up and recommended labs and tests     Follow up with primary care provider, PERICO HERRERA, within 7 days for hospital follow- up.  No follow up labs or test are needed. Will need refills on meds.  Follow up with cardiology as scheduled.     Activity    Your activity upon discharge: activity as tolerated     Diet    Follow this diet upon discharge: Orders Placed This Encounter      Snacks/Supplements Adult: Ensure Enlive; With Meals      Regular Diet Adult       Significant Results and Procedures   Results for orders placed or performed during the hospital encounter of 24   Echocardiogram Complete     Value    Biplane LVEF 39%    Narrative    [id number]  [id number]  [id number]  [id number]^CHIN^[place]BH[place] [address]     Name: HARDIK CASTRO  MRN: [id number]  : 1950  Study Date: 2024 01:21 PM  Age: 73 yrs  Gender: Male  Patient Location: [facility]  Reason For  Study: Endocarditis  Ordering Physician: SUBHASH NEELY  Performed By: CLYDE     BSA: 2.3 m2  Height: 75 in  Weight: 224 lb  HR: 90  BP: 112/68 mmHg  ______________________________________________________________________________  Procedure  Complete Portable Echo Adult. Definity (NDC #64033-034) given intravenously.  Adequate quality two-dimensional was performed and interpreted. No  hemodynamically significant valvular abnormalities on 2D or color flow  imaging. There is no comparison study available.  ______________________________________________________________________________  Interpretation Summary     1. Biplane LVEF is 39%. There is mild-moderate global hypokinesia of the left  ventricle.  2. Normal right ventricle size and systolic function.  3. No hemodynamically significant valvular abnormalities on 2D or color flow  imaging.  4. Aortic root dilatation is present, measuring 41 mm.  5. No evidence of endocarditis on this study  6. There is no comparison study available.  ______________________________________________________________________________  Left Ventricle  The left ventricle is normal in size. There is normal left ventricular wall  thickness. Biplane LVEF is 39%. Left ventricular diastolic function is  abnormal. There is mild-moderate global hypokinesia of the left ventricle.     Right Ventricle  Normal right ventricle size and systolic function.     Atria  Normal left atrial size. Right atrial size is normal. There is no color  Doppler evidence of an atrial shunt.     Mitral Valve  Mitral valve leaflets appear normal. There is no evidence of mitral stenosis  or clinically significant mitral regurgitation.     Tricuspid Valve  Tricuspid valve leaflets appear normal. There is no evidence of tricuspid  stenosis or clinically significant tricuspid regurgitation. Right ventricular  systolic pressure could not be approximated due to inadequate tricuspid  regurgitation.     Aortic Valve  The aortic valve  is trileaflet. Aortic valve leaflets appear normal. There is  no evidence of aortic stenosis or clinically significant aortic regurgitation.     Pulmonic Valve  The pulmonic valve is not well seen, but is grossly normal. This degree of  valvular regurgitation is within normal limits. There is trace pulmonic  valvular regurgitation.     Vessels  Normal size ascending aorta. Aortic root dilatation is present. IVC diameter  <2.1 cm collapsing >50% with sniff suggests a normal RA pressure of 3 mmHg.     Pericardium  There is no pericardial effusion.     Rhythm  Sinus rhythm was noted.  ______________________________________________________________________________  MMode/2D Measurements & Calculations     RVDd: 3.5 cm  IVSd: 1.1 cm  LVIDd: 5.7 cm  LVIDs: 4.0 cm  LVPWd: 1.1 cm  FS: 29.1 %  LV mass(C)d: 245.5 grams  LV mass(C)dI: 106.6 grams/m2  Ao root diam: 4.1 cm  LA dimension: 3.6 cm  asc Aorta Diam: 3.6 cm  LA/Ao: 0.87  LVOT diam: 2.3 cm  LVOT area: 4.3 cm2  Ao root diam index Ht(cm/m): 2.2  Ao root diam index BSA (cm/m2): 1.8  Asc Ao diam index BSA (cm/m2): 1.6  Asc Ao diam index Ht(cm/m): 1.9  EF Biplane: 38.6 %     LA Volume Indexed (AL/bp): 15.1 ml/m2  RWT: 0.37  TAPSE: 2.4 cm     Time Measurements  MM HR: 90.0 BPM     Doppler Measurements & Calculations  MV E max fredrick: 58.3 cm/sec  MV A max fredrick: 73.0 cm/sec  MV E/A: 0.80  MV dec time: 0.11 sec  LV V1 max P.7 mmHg  LV V1 max: 82.7 cm/sec  LV V1 VTI: 12.5 cm  SV(LVOT): 53.8 ml  SI(LVOT): 23.4 ml/m2  PA acc time: 0.07 sec  E/E' av.0     Lateral E/e': 13.3  Medial E/e': 8.6  RV S Fredrick: 23.5 cm/sec     ______________________________________________________________________________  Report approved by: Kojo Stephen 2024 02:36 PM             Discharge Medications   Current Discharge Medication List        START taking these medications    Details   apixaban ANTICOAGULANT (ELIQUIS) 5 MG tablet Take 1 tablet (5 mg) by mouth 2 times daily  Qty: 60  tablet, Refills: 0    Comments: FURTHER REFILLS FROM CARDIOLOGY OR PCP  Associated Diagnoses: Paroxysmal atrial fibrillation (H)      digoxin (LANOXIN) 125 MCG tablet Take 1 tablet (125 mcg) by mouth daily  Qty: 30 tablet, Refills: 0    Associated Diagnoses: Paroxysmal atrial fibrillation (H)      doxycycline hyclate (VIBRAMYCIN) 100 MG capsule Take 1 capsule (100 mg) by mouth every 12 hours for 4 days  Qty: 8 capsule, Refills: 0    Associated Diagnoses: Febrile illness      lisinopril (ZESTRIL) 2.5 MG tablet Take 1 tablet (2.5 mg) by mouth daily  Qty: 30 tablet, Refills: 0    Associated Diagnoses: Cardiomyopathy, unspecified type (H)      metoprolol succinate ER (TOPROL XL) 50 MG 24 hr tablet Take 1 tablet (50 mg) by mouth daily  Qty: 30 tablet, Refills: 0    Associated Diagnoses: Paroxysmal atrial fibrillation (H)      spironolactone (ALDACTONE) 25 MG tablet Take 0.5 tablets (12.5 mg) by mouth daily  Qty: 30 tablet, Refills: 0    Associated Diagnoses: Cardiomyopathy, unspecified type (H)           CONTINUE these medications which have NOT CHANGED    Details   acetaminophen (TYLENOL) 500 MG tablet Take 500-1,000 mg by mouth every 6 hours as needed for mild pain or fever      vitamin D3 (CHOLECALCIFEROL) 50 mcg (2000 units) tablet Take 1 tablet by mouth daily as needed           Allergies   No Known Allergies

## 2024-04-30 NOTE — PLAN OF CARE
Problem: Dysrhythmia  Goal: Normalized Cardiac Rhythm  Outcome: Progressing     Problem: Nausea and Vomiting  Goal: Nausea and Vomiting Relief  Outcome: Progressing    Pt resting between cares, eager for discharge. Tele reading A fib, ranging  bpm. Rash to back & chest improving per pt report. Potential to discharge tomorrow.

## 2024-04-30 NOTE — TELEPHONE ENCOUNTER
----- Message from Shorty Hernandez MD sent at 4/30/2024  8:10 AM CDT -----  Please contact to schedule a hospital follow up appt with me within a week.  Ok to use approv req slots.  Thanks!

## 2024-04-30 NOTE — TELEPHONE ENCOUNTER
04/30 I called and left message for pt to call back to schedule a hosp follow up per Mary velasquez to use approval slots

## 2024-05-01 DIAGNOSIS — I48.19 PERSISTENT ATRIAL FIBRILLATION (H): ICD-10-CM

## 2024-05-01 DIAGNOSIS — I25.5 ISCHEMIC CARDIOMYOPATHY: Primary | ICD-10-CM

## 2024-05-02 ENCOUNTER — TELEPHONE (OUTPATIENT)
Dept: INTERNAL MEDICINE | Facility: CLINIC | Age: 74
End: 2024-05-02
Payer: COMMERCIAL

## 2024-05-02 ENCOUNTER — PATIENT OUTREACH (OUTPATIENT)
Dept: CARE COORDINATION | Facility: CLINIC | Age: 74
End: 2024-05-02
Payer: COMMERCIAL

## 2024-05-02 NOTE — PROGRESS NOTES
[facility]:   [facility] Contact  [facility]/[employer]     Clinical Data: Post-Discharge Outreach     Outreach attempted x 2.  Left message on patient's voicemail, providing [facility]'s central phone number of [phone] ([phone]) for questions/concerns and/or to schedule an appt with an [facility] provider, if they do not have a PCP.      Plan:  [facility] will do no further outreaches at this time.       MARU Burt (she/her/hers)  Social Work Clinic Care Coordinator   [facility]  [email or web address]  [phone]      *Connected Care Resource Team does NOT follow patient ongoing. Referrals are identified based on internal discharge reports and the outreach is to ensure patient has an understanding of their discharge instructions.

## 2024-05-04 LAB
CV A10 AB TITR SER CF: NORMAL {TITER}
CV A16 AB TITR SER CF: NORMAL {TITER}
CV A2 AB TITR SER CF: NORMAL {TITER}
CV A4 AB TITR SER CF: NORMAL {TITER}
CV A7 AB TITR SER CF: NORMAL {TITER}
CV A9 AB TITR SER CF: NORMAL {TITER}

## 2024-05-06 ENCOUNTER — NURSE TRIAGE (OUTPATIENT)
Dept: NURSING | Facility: CLINIC | Age: 74
End: 2024-05-06
Payer: COMMERCIAL

## 2024-05-06 NOTE — TELEPHONE ENCOUNTER
Was in hospital for a week for problem with tick bite with resultant A fib. Has extreme weakness, seen yesterday in ED at [place] with referral to [facility]. Is being pressured to [facility] by family members. Is looking for advice from PCP on where to go from here. Currently sitting at home very weak, Can get out of bed and walk for a couple minutes then has to get back into bed due to weakness.     Has upcoming appointment with PCP this Wednesday. Would like to speak to PCP today. Please call today at [phone]  Reason for Disposition   Nursing judgment    Additional Information   Negative: New-onset or worsening symptoms, see that protocol (e.g., diarrhea, runny nose, sore throat)   Negative: Medicine question not related to refill or renewal   Negative: Requesting a renewal or refill of a medicine patient is currently taking   Negative: Questions or concerns about high blood pressure   Negative: Nursing judgment    Protocols used: Information Only Call - No Triage-A-OH

## 2024-05-06 NOTE — TELEPHONE ENCOUNTER
If he is still feeling that ill I do recommend that he go back to hospital setting.  If he wants to go to [facility] he would have to be taken to the emergency room at [facility] or one of the [facility] emergency rooms.  If he wants to stay in the [facility], I would suggest perhaps going to the [facility] emergency room, rather than going back to [facility].  I would recommend he go to a more of an academic or tertiary center.

## 2024-05-07 LAB
CV B1 NAB TITR SER NT: ABNORMAL {TITER}
CV B2 NAB TITR SER NT: ABNORMAL {TITER}
CV B3 NAB TITR SER NT: ABNORMAL {TITER}
CV B4 NAB TITR SER NT: ABNORMAL {TITER}
CV B5 NAB TITR SER NT: ABNORMAL {TITER}
CV B6 NAB TITR SER NT: ABNORMAL {TITER}

## 2024-05-08 ENCOUNTER — OFFICE VISIT (OUTPATIENT)
Dept: INTERNAL MEDICINE | Facility: CLINIC | Age: 74
End: 2024-05-08
Payer: COMMERCIAL

## 2024-05-08 VITALS
RESPIRATION RATE: 16 BRPM | BODY MASS INDEX: 26.36 KG/M2 | DIASTOLIC BLOOD PRESSURE: 74 MMHG | WEIGHT: 212 LBS | TEMPERATURE: 97.8 F | OXYGEN SATURATION: 96 % | HEIGHT: 75 IN | SYSTOLIC BLOOD PRESSURE: 110 MMHG | HEART RATE: 54 BPM

## 2024-05-08 DIAGNOSIS — E87.5 HYPERKALEMIA: ICD-10-CM

## 2024-05-08 DIAGNOSIS — Z09 HOSPITAL DISCHARGE FOLLOW-UP: Primary | ICD-10-CM

## 2024-05-08 DIAGNOSIS — R50.9 FEBRILE ILLNESS: ICD-10-CM

## 2024-05-08 DIAGNOSIS — I42.9 CARDIOMYOPATHY, UNSPECIFIED TYPE (H): ICD-10-CM

## 2024-05-08 DIAGNOSIS — I48.19 PERSISTENT ATRIAL FIBRILLATION (H): ICD-10-CM

## 2024-05-08 LAB
ANION GAP SERPL CALCULATED.3IONS-SCNC: 8 MMOL/L (ref 7–15)
ATRIAL RATE - MUSE: 264 BPM
BUN SERPL-MCNC: 25.1 MG/DL (ref 8–23)
CALCIUM SERPL-MCNC: 9.7 MG/DL (ref 8.8–10.2)
CHLORIDE SERPL-SCNC: 99 MMOL/L (ref 98–107)
CREAT SERPL-MCNC: 1.35 MG/DL (ref 0.67–1.17)
DEPRECATED HCO3 PLAS-SCNC: 26 MMOL/L (ref 22–29)
DIASTOLIC BLOOD PRESSURE - MUSE: NORMAL MMHG
EGFRCR SERPLBLD CKD-EPI 2021: 55 ML/MIN/1.73M2
GLUCOSE SERPL-MCNC: 99 MG/DL (ref 70–99)
INTERPRETATION ECG - MUSE: NORMAL
P AXIS - MUSE: 257 DEGREES
POTASSIUM SERPL-SCNC: 5.2 MMOL/L (ref 3.4–5.3)
PR INTERVAL - MUSE: NORMAL MS
QRS DURATION - MUSE: 94 MS
QT - MUSE: 390 MS
QTC - MUSE: 482 MS
R AXIS - MUSE: -44 DEGREES
SODIUM SERPL-SCNC: 133 MMOL/L (ref 135–145)
SYSTOLIC BLOOD PRESSURE - MUSE: NORMAL MMHG
T AXIS - MUSE: -7 DEGREES
VENTRICULAR RATE- MUSE: 92 BPM

## 2024-05-08 PROCEDURE — 99214 OFFICE O/P EST MOD 30 MIN: CPT | Performed by: INTERNAL MEDICINE

## 2024-05-08 PROCEDURE — 36415 COLL VENOUS BLD VENIPUNCTURE: CPT | Performed by: INTERNAL MEDICINE

## 2024-05-08 PROCEDURE — 80048 BASIC METABOLIC PNL TOTAL CA: CPT | Performed by: INTERNAL MEDICINE

## 2024-05-08 PROCEDURE — 93005 ELECTROCARDIOGRAM TRACING: CPT | Performed by: INTERNAL MEDICINE

## 2024-05-08 PROCEDURE — 93010 ELECTROCARDIOGRAM REPORT: CPT | Performed by: INTERNAL MEDICINE

## 2024-05-08 NOTE — PROGRESS NOTES
1. Hospital discharge follow-up  He seems to be improving.  I did discuss with him that now that the acute phase of his illness is over we will have to follow his cardiac function and cardiac issues.  He will continue current regimen until he sees cardiology and he wants to proceed with treatment at the [facility].    2. Febrile illness  Resolved.  The only thing that turned up was a coxsackie virus titer.  I do not know what to make of that.    3. Cardiomyopathy, unspecified type (H)  He seems to be doing fairly well with his current regimen including the spironolactone and other medications prescribed by cardiology.  Will check labs today to make sure he is not becoming too hyperkalemic on the current regimen.  - Basic metabolic panel  (Ca, Cl, CO2, Creat, Gluc, K, Na, BUN); Future  - Basic metabolic panel  (Ca, Cl, CO2, Creat, Gluc, K, Na, BUN)    4. Persistent atrial fibrillation (H)  Rate is adequately controlled.  Continue regimen.  Actually today is in a flutter.  He will be meeting with new cardiologist soon  - EKG 12-lead, tracing only    5. Hyperkalemia  As above we will recheck potassium level today.  - Basic metabolic panel  (Ca, Cl, CO2, Creat, Gluc, K, Na, BUN); Future  - Basic metabolic panel  (Ca, Cl, CO2, Creat, Gluc, K, Na, BUN)     Curtis Nelson is a 73 year old, presenting for the following health issues:  Hospital F/U (Afib & Weakness 4/25/24- 4/30/24 & was also seen [facility] in [place] on 5/5/24 )      5/8/2024    10:49 AM   Additional Questions   Roomed by ERINN Bruno   Accompanied by alone         5/8/2024    10:49 AM   Patient Reported Additional Medications   Patient reports taking the following new medications none     HPI       Hospital Follow-up Visit:    Hospital/Nursing Home/IP Rehab Facility: [facility]  Date of Admission: 4/25/24  Date of Discharge: 4/30/24  Reason(s) for Admission: Afib   Was the patient in the ICU or did the patient experience  delirium during hospitalization?  No  Do you have any other stressors you would like to discuss with your provider? No    Problems taking medications regularly:  None  Medication changes since discharge: Yes, several medications changes   Problems adhering to non-medication therapy:  None    Summary of hospitalization:  [facility] discharge summary reviewed  Diagnostic Tests/Treatments reviewed.  Follow up needed: cardiology  Other Healthcare Providers Involved in Patient s Care:         Specialist appointment -    Update since discharge: improved.         Plan of care communicated with patient         Omar comes in today for hospital follow-up.  [facility].  I saw him in the clinic here couple weeks ago and he looked quite ill.  He was sent to emergency room and there he was actually sent home just to return back within a few hours.  He was having high fevers of several days duration of 103+.  He was having profound weakness and fatigue.  He was found to be in A-fib with rapid ventricular response.  New cardiomyopathy with an EF in the 30s.  He was started on antibiotics to treat possible tickborne illness.  Not seem to help him.  He was seen by cardiology and infectious disease.  I refer reader to the discharge summaries and all of those consultation reports.  He was discharged home and continued to feel poorly.  A lot of ongoing fatigue.  He was getting quite discouraged and family encouraged him to be seen at [facility].  He presented himself to a [facility] facility in [place] this past weekend.  He was sent home from there with no change in medications but was set up to see it cardiologist in the system.  He does not know when that appointment is as of yet but believes it is later this month.  Since that visit he initially was not feeling that great but today he states he is felt better than he has in weeks.  Fatigue is better.  He still  "does not have much of an appetite.  No longer having fever.            Objective    /74 (BP Location: Left arm, Patient Position: Sitting, Cuff Size: Adult Regular)   Pulse 54   Temp 97.8  F (36.6  C) (Tympanic)   Resp 16   Ht 1.905 m (6' 3\")   Wt 96.2 kg (212 lb)   SpO2 96%   BMI 26.50 kg/m    Body mass index is 26.5 kg/m .  Physical Exam   He looks much better than when I saw him last.  Heart is irregularly irregular.  No distress.            Signed Electronically by: PERICO HERRERA MD    "

## 2024-05-16 ENCOUNTER — NURSE TRIAGE (OUTPATIENT)
Dept: NURSING | Facility: CLINIC | Age: 74
End: 2024-05-16
Payer: COMMERCIAL

## 2024-05-16 NOTE — TELEPHONE ENCOUNTER
Today patient is sweating and is extremely weak and has no hunger.  Foods gives him nausea.   Patient was put on Doxycycline.  Patient states that he will go to ER.        Reason for Disposition   MODERATE weakness or fatigue from poor fluid intake with no improvement after 2 hours of rest and fluids    Additional Information   Negative: SEVERE difficulty breathing (e.g., struggling for each breath, speaks in single words)   Negative: Shock suspected (e.g., cold/pale/clammy skin, too weak to stand, low BP, rapid pulse)   Negative: Difficult to awaken or acting confused (e.g., disoriented, slurred speech)   Negative: Fainted > 15 minutes ago and still feels too weak or dizzy to stand   Negative: SEVERE weakness (i.e., unable to walk or barely able to walk, requires support) and new-onset or worsening   Negative: Sounds like a life-threatening emergency to the triager   Negative: Difficulty breathing   Negative: Heart beating < 50 beats per minute OR > 140 beats per minute   Negative: Extra heartbeats, irregular heart beating, or heart is beating very fast (i.e., 'palpitations')   Negative: Follows large amount of bleeding (e.g., from vomiting, rectum, vagina) (Exception: Small transient weakness from sight of a small amount blood.)   Negative: Black or tarry bowel movements    Protocols used: Weakness (Generalized) and Fatigue-A-OH

## 2024-05-28 ENCOUNTER — DOCUMENTATION ONLY (OUTPATIENT)
Dept: LAB | Facility: CLINIC | Age: 74
End: 2024-05-28
Payer: COMMERCIAL

## 2024-05-28 NOTE — PROGRESS NOTES
Omar Perez has an upcoming lab appointment:    Future Appointments   Date Time Provider Department Center   5/29/2024  7:45 AM [facility] LAB MYLABR [facility]   5/29/2024  9:00 AM Barbra Lopez MD MYFMOB [facility]     Patient has a lab only appt 5/29/24. Per appt notes, it is for a CBC and BMP.     There is no order available. Please review and place order, as appropriate.      Enrique Archer

## 2024-05-28 NOTE — TELEPHONE ENCOUNTER
Okay to see Dr. Cox tomorrow if he wants, but you can also offer him a follow-up with me for hospital follow-up first available.  Okay to use approval required.  40 min.

## 2024-05-28 NOTE — TELEPHONE ENCOUNTER
I do not have any orders for him.  It looks like he needs a hospital follow-up?  Please contact him to schedule a hospital follow-up with me first available.  Thank you.  We can do labs at the hospital follow-up.

## 2024-05-28 NOTE — TELEPHONE ENCOUNTER
Pt said he needed to see a doctor about his heart before this Friday, when his medication runs out.  But he still wants to see you on 6/6, so he will keep both his appts.

## 2024-05-29 ENCOUNTER — OFFICE VISIT (OUTPATIENT)
Dept: FAMILY MEDICINE | Facility: CLINIC | Age: 74
End: 2024-05-29
Payer: COMMERCIAL

## 2024-05-29 ENCOUNTER — TRANSFERRED RECORDS (OUTPATIENT)
Dept: HEALTH INFORMATION MANAGEMENT | Facility: CLINIC | Age: 74
End: 2024-05-29

## 2024-05-29 VITALS
TEMPERATURE: 98.3 F | HEIGHT: 75 IN | SYSTOLIC BLOOD PRESSURE: 103 MMHG | OXYGEN SATURATION: 97 % | HEART RATE: 48 BPM | WEIGHT: 214.9 LBS | BODY MASS INDEX: 26.72 KG/M2 | RESPIRATION RATE: 16 BRPM | DIASTOLIC BLOOD PRESSURE: 62 MMHG

## 2024-05-29 DIAGNOSIS — G47.34 NOCTURNAL HYPOXIA: Primary | ICD-10-CM

## 2024-05-29 DIAGNOSIS — I42.9 CARDIOMYOPATHY, UNSPECIFIED TYPE (H): ICD-10-CM

## 2024-05-29 DIAGNOSIS — I48.0 PAROXYSMAL ATRIAL FIBRILLATION (H): ICD-10-CM

## 2024-05-29 DIAGNOSIS — N17.9 AKI (ACUTE KIDNEY INJURY) (H): ICD-10-CM

## 2024-05-29 DIAGNOSIS — R06.83 PRIMARY SNORING: ICD-10-CM

## 2024-05-29 LAB
ANION GAP SERPL CALCULATED.3IONS-SCNC: 9 MMOL/L (ref 7–15)
BUN SERPL-MCNC: 21.6 MG/DL (ref 8–23)
CALCIUM SERPL-MCNC: 9.6 MG/DL (ref 8.8–10.2)
CHLORIDE SERPL-SCNC: 103 MMOL/L (ref 98–107)
CREAT SERPL-MCNC: 1.33 MG/DL (ref 0.67–1.17)
DEPRECATED HCO3 PLAS-SCNC: 26 MMOL/L (ref 22–29)
EGFRCR SERPLBLD CKD-EPI 2021: 56 ML/MIN/1.73M2
GLUCOSE SERPL-MCNC: 93 MG/DL (ref 70–99)
HGB BLD-MCNC: 13.1 G/DL (ref 13.3–17.7)
POTASSIUM SERPL-SCNC: 4.8 MMOL/L (ref 3.4–5.3)
SODIUM SERPL-SCNC: 138 MMOL/L (ref 135–145)

## 2024-05-29 PROCEDURE — 99214 OFFICE O/P EST MOD 30 MIN: CPT | Performed by: FAMILY MEDICINE

## 2024-05-29 PROCEDURE — 36415 COLL VENOUS BLD VENIPUNCTURE: CPT | Performed by: FAMILY MEDICINE

## 2024-05-29 PROCEDURE — 85018 HEMOGLOBIN: CPT | Performed by: FAMILY MEDICINE

## 2024-05-29 PROCEDURE — 80048 BASIC METABOLIC PNL TOTAL CA: CPT | Performed by: FAMILY MEDICINE

## 2024-05-29 PROCEDURE — G2211 COMPLEX E/M VISIT ADD ON: HCPCS | Performed by: FAMILY MEDICINE

## 2024-05-29 RX ORDER — LISINOPRIL 2.5 MG/1
2.5 TABLET ORAL DAILY
Qty: 90 TABLET | Refills: 0 | Status: SHIPPED | OUTPATIENT
Start: 2024-05-29

## 2024-05-29 RX ORDER — METOPROLOL SUCCINATE 50 MG/1
25 TABLET, EXTENDED RELEASE ORAL DAILY
Qty: 45 TABLET | Refills: 3 | Status: SHIPPED | OUTPATIENT
Start: 2024-05-29 | End: 2024-09-16

## 2024-05-29 ASSESSMENT — PAIN SCALES - GENERAL: PAINLEVEL: NO PAIN (0)

## 2024-05-29 NOTE — PROGRESS NOTES
"Assessment & Plan     Paroxysmal atrial fibrillation (H)  Decreased exercise tolerance on 75 mg of metoprolol a day. His pulse is below goal and his BP is low. Hopefully, reducing the metoprolol to 50 mg a day will allow these to improve without re-emergence of atrial fibrillation.  - Overnight Oximetry Order for DME - ONLY FOR DME  - metoprolol succinate ER (TOPROL XL) 50 MG 24 hr tablet  Dispense: 45 tablet; Refill: 3  - apixaban ANTICOAGULANT (ELIQUIS) 5 MG tablet  Dispense: 180 tablet; Refill: 3    AMADA (acute kidney injury) (H24)  These labs showed improvement.  - Hemoglobin  - Basic metabolic panel  (Ca, Cl, CO2, Creat, Gluc, K, Na, BUN)    Cardiomyopathy, unspecified type (H)  - Overnight Oximetry Order for DME - ONLY FOR DME  - lisinopril (ZESTRIL) 2.5 MG tablet  Dispense: 90 tablet; Refill: 0    Nocturnal hypoxia  - Overnight Oximetry Order for DME - ONLY FOR DME    Primary snoring   Overnight Oximetry Order for DME - ONLY FOR DME  MED REC REQUIRED-- reconciled. Only change is reduced metoprolol.  Post Medication Reconciliation Status:     BMI  Estimated body mass index is 26.86 kg/m  as calculated from the following:    Height as of this encounter: 1.905 m (6' 3\").    Weight as of this encounter: 97.5 kg (214 lb 14.4 oz).     Subjective   Omar is a 73 year old, presenting for the following health issues:  Hospital F/U and Recheck Medication (Pt reports that he is here for follow up from hospital admission from 5/16/24 till 5/19/24 for cardiac issues. Pt also needs medication refill.)      5/29/2024     8:54 AM   Additional Questions   Roomed by michele   Accompanied by alone         5/29/2024     8:56 AM   Patient Reported Additional Medications   Patient reports taking the following new medications atorvastatin     Feels better with the lower pulse.   Used to working out, but over the last month.  His apple watch shows his heartrate is lower-- low 50's.     Hospital Follow-up Visit:    Hospital/Nursing " "Home/IP Rehab Facility:  [facility]  Date of Admission: 5/16/24  Date of Discharge: 5/19/24  Reason(s) for Admission: afib  Was the patient in the ICU or did the patient experience delirium during hospitalization?  No  Do you have any other stressors you would like to discuss with your provider? No    Problems taking medications regularly:  None  Medication changes since discharge: None  Problems adhering to non-medication therapy:  None    Summary of hospitalization:  [facility] discharge summary reviewed  Diagnostic Tests/Treatments reviewed.  Follow up needed: Hemoglobin, creatinine and overnight oxymetry.  Other Healthcare Providers Involved in Patient s Care:         Specialist appointment - Cardiology  Update since discharge: exercise intolerance     Plan of care communicated with patient            Objective    /62 (BP Location: Left arm, Patient Position: Sitting, Cuff Size: Adult Regular)   Pulse (!) 48   Temp 98.3  F (36.8  C) (Tympanic)   Resp 16   Ht 1.905 m (6' 3\")   Wt 97.5 kg (214 lb 14.4 oz)   SpO2 97%   BMI 26.86 kg/m    Body mass index is 26.86 kg/m .  Physical Exam   GENERAL: alert and no distress  EYES: Eyes grossly normal to inspection, PERRL and conjunctivae and sclerae normal  HENT: ear canals and TM's normal, nose and mouth without ulcers or lesions  NECK: no adenopathy, no asymmetry, masses, or scars  RESP: lungs clear to auscultation - no rales, rhonchi or wheezes  CV: regular rate and rhythm, normal S1 S2, no S3 or S4, no murmur, click or rub, no peripheral edema  ABDOMEN: soft, nontender, no hepatosplenomegaly, no masses and bowel sounds normal  MS: no gross musculoskeletal defects noted, no edema  SKIN: no suspicious lesions or rashes  NEURO: Normal strength and tone, mentation intact and speech normal  PSYCH: mentation appears normal, affect normal/bright  Results for orders placed or performed in visit on 05/29/24   Hemoglobin     Status: Abnormal   Result " Value Ref Range    Hemoglobin 13.1 (L) 13.3 - 17.7 g/dL   Basic metabolic panel  (Ca, Cl, CO2, Creat, Gluc, K, Na, BUN)     Status: Abnormal   Result Value Ref Range    Sodium 138 135 - 145 mmol/L    Potassium 4.8 3.4 - 5.3 mmol/L    Chloride 103 98 - 107 mmol/L    Carbon Dioxide (CO2) 26 22 - 29 mmol/L    Anion Gap 9 7 - 15 mmol/L    Urea Nitrogen 21.6 8.0 - 23.0 mg/dL    Creatinine 1.33 (H) 0.67 - 1.17 mg/dL    GFR Estimate 56 (L) >60 mL/min/1.73m2    Calcium 9.6 8.8 - 10.2 mg/dL    Glucose 93 70 - 99 mg/dL   Signed Electronically by: HARRIET HAMILTON MD

## 2024-06-06 ENCOUNTER — OFFICE VISIT (OUTPATIENT)
Dept: INTERNAL MEDICINE | Facility: CLINIC | Age: 74
End: 2024-06-06
Payer: COMMERCIAL

## 2024-06-06 VITALS
OXYGEN SATURATION: 96 % | RESPIRATION RATE: 11 BRPM | HEART RATE: 54 BPM | TEMPERATURE: 97.2 F | WEIGHT: 215 LBS | DIASTOLIC BLOOD PRESSURE: 76 MMHG | BODY MASS INDEX: 26.73 KG/M2 | HEIGHT: 75 IN | SYSTOLIC BLOOD PRESSURE: 116 MMHG

## 2024-06-06 DIAGNOSIS — I42.9 CARDIOMYOPATHY, UNSPECIFIED TYPE (H): ICD-10-CM

## 2024-06-06 DIAGNOSIS — N40.1 BPH ASSOCIATED WITH NOCTURIA: ICD-10-CM

## 2024-06-06 DIAGNOSIS — R35.1 BPH ASSOCIATED WITH NOCTURIA: ICD-10-CM

## 2024-06-06 DIAGNOSIS — Z79.899 LONG TERM CURRENT USE OF AMIODARONE: ICD-10-CM

## 2024-06-06 DIAGNOSIS — I48.19 PERSISTENT ATRIAL FIBRILLATION (H): Primary | ICD-10-CM

## 2024-06-06 LAB
ATRIAL RATE - MUSE: 49 BPM
DIASTOLIC BLOOD PRESSURE - MUSE: NORMAL MMHG
ERYTHROCYTE [DISTWIDTH] IN BLOOD BY AUTOMATED COUNT: 13.6 % (ref 10–15)
HCT VFR BLD AUTO: 38.4 % (ref 40–53)
HGB BLD-MCNC: 12.7 G/DL (ref 13.3–17.7)
INTERPRETATION ECG - MUSE: NORMAL
MCH RBC QN AUTO: 30.2 PG (ref 26.5–33)
MCHC RBC AUTO-ENTMCNC: 33.1 G/DL (ref 31.5–36.5)
MCV RBC AUTO: 91 FL (ref 78–100)
P AXIS - MUSE: 49 DEGREES
PLATELET # BLD AUTO: 255 10E3/UL (ref 150–450)
PR INTERVAL - MUSE: 176 MS
QRS DURATION - MUSE: 106 MS
QT - MUSE: 484 MS
QTC - MUSE: 437 MS
R AXIS - MUSE: -31 DEGREES
RBC # BLD AUTO: 4.21 10E6/UL (ref 4.4–5.9)
SYSTOLIC BLOOD PRESSURE - MUSE: NORMAL MMHG
T AXIS - MUSE: 13 DEGREES
VENTRICULAR RATE- MUSE: 49 BPM
WBC # BLD AUTO: 5.3 10E3/UL (ref 4–11)

## 2024-06-06 PROCEDURE — 93005 ELECTROCARDIOGRAM TRACING: CPT | Performed by: INTERNAL MEDICINE

## 2024-06-06 PROCEDURE — 85027 COMPLETE CBC AUTOMATED: CPT | Performed by: INTERNAL MEDICINE

## 2024-06-06 PROCEDURE — 99214 OFFICE O/P EST MOD 30 MIN: CPT | Performed by: INTERNAL MEDICINE

## 2024-06-06 PROCEDURE — 80053 COMPREHEN METABOLIC PANEL: CPT | Performed by: INTERNAL MEDICINE

## 2024-06-06 PROCEDURE — 84443 ASSAY THYROID STIM HORMONE: CPT | Performed by: INTERNAL MEDICINE

## 2024-06-06 PROCEDURE — G2211 COMPLEX E/M VISIT ADD ON: HCPCS | Performed by: INTERNAL MEDICINE

## 2024-06-06 PROCEDURE — 93010 ELECTROCARDIOGRAM REPORT: CPT | Performed by: INTERNAL MEDICINE

## 2024-06-06 PROCEDURE — 36415 COLL VENOUS BLD VENIPUNCTURE: CPT | Performed by: INTERNAL MEDICINE

## 2024-06-06 RX ORDER — AMIODARONE HYDROCHLORIDE 200 MG/1
200 TABLET ORAL DAILY
COMMUNITY
Start: 2024-05-18 | End: 2025-06-16

## 2024-06-06 RX ORDER — ATORVASTATIN CALCIUM 40 MG/1
1 TABLET, FILM COATED ORAL AT BEDTIME
COMMUNITY
Start: 2024-05-17

## 2024-06-06 ASSESSMENT — PAIN SCALES - GENERAL: PAINLEVEL: NO PAIN (0)

## 2024-06-06 NOTE — PROGRESS NOTES
1. Persistent atrial fibrillation (H)  Will do some additional lab work today given his high risk amiodarone use.  They did do some baseline.  We need to follow-up some abnormals that he had when he was ill.  He will follow-up with me via [facility] after he goes to [place].  I suspect they will repeat an echo in July I hope.  - EKG 12-lead, tracing only  - CBC with platelets; Future  - TSH with free T4 reflex; Future  - Comprehensive metabolic panel (BMP + Alb, Alk Phos, ALT, AST, Total. Bili, TP); Future    2. Cardiomyopathy, unspecified type (H)  I did discuss with patient the high risk nature of amiodarone and hopefully they will be able to get him off of that soon as possible    3. Long term current use of amiodarone  As above.  I discussed with him what to look for in terms of symptoms he could get from amiodarone toxicity.  - EKG 12-lead, tracing only  - CBC with platelets; Future  - TSH with free T4 reflex; Future  - Comprehensive metabolic panel (BMP + Alb, Alk Phos, ALT, AST, Total. Bili, TP); Future     The longitudinal plan of care for the diagnosis(es)/condition(s) as documented were addressed during this visit. Due to the added complexity in care, I will continue to support Omar in the subsequent management and with ongoing continuity of care.    Curtis Nelson is a 73 year old, presenting for the following health issues:  Follow Up (1 month follow for febrile illness and loss of appetite. Medication recheck.)      6/6/2024     1:05 PM   Additional Questions   Roomed by Shantel PAYAN     History of Present Illness       Heart Failure:  He presents for follow up of heart failure. He is not experiencing shortness of breath at night, with rest or with activity  He is not experiencing any lower extremity edema.   He denies orthopenea and is not coughing at night. Patient is not checking weight daily. He has recently had a weight decrease.  He has no side effects from medications.  He has had no other medical  "visits for heart failure since the last visit.    He eats 0-1 servings of fruits and vegetables daily.He consumes 0 sweetened beverage(s) daily.He exercises with enough effort to increase his heart rate 30 to 60 minutes per day.  He exercises with enough effort to increase his heart rate 3 or less days per week. He is missing 7 dose(s) of medications per week.     Last Echo:   Echo result w/o MOPS: Interpretation Summary 1. Biplane LVEF is 39%. There is mild-moderate global hypokinesia of the leftventricle.2. Normal right ventricle size and systolic function.3. No hemodynamically significant valvular abnormalities on 2D or color flowimaging.4. Aortic root dilatation is present, measuring 41 mm.5. No evidence of endocarditis on this study6. There is no comparison study available.      Omar comes in  for follow-up.  He was very sick this spring with a fever illness that was probably viral in nature.  He sustained a cardiomyopathy from it.  He then went in [facility] with rapid ventricle response.  He sought care down at [place].  They cardioverted him.  They put him on amiodarone and he is on that as of now.  He is here for follow-up.  He feels quite good.  He wants to get back into his exercise.  He was quite active with swimming prior to this.  He feels good although he does not like taking all the medications.  He follows up with [facility] in July.              Objective    /76 (BP Location: Left arm, Patient Position: Sitting, Cuff Size: Adult Regular)   Pulse 54   Temp 97.2  F (36.2  C) (Tympanic)   Resp 11   Ht 1.905 m (6' 3\")   Wt 97.5 kg (215 lb)   SpO2 96%   BMI 26.87 kg/m    Body mass index is 26.87 kg/m .  Physical Exam       Pleasant gentleman.  No distress.  Heart regular.  Lungs clear        Signed Electronically by: PERICO HERRERA MD    "

## 2024-06-07 LAB
ALBUMIN SERPL BCG-MCNC: 4 G/DL (ref 3.5–5.2)
ALP SERPL-CCNC: 85 U/L (ref 40–150)
ALT SERPL W P-5'-P-CCNC: 16 U/L (ref 0–70)
ANION GAP SERPL CALCULATED.3IONS-SCNC: 13 MMOL/L (ref 7–15)
AST SERPL W P-5'-P-CCNC: 23 U/L (ref 0–45)
BILIRUB SERPL-MCNC: 0.5 MG/DL
BUN SERPL-MCNC: 17.5 MG/DL (ref 8–23)
CALCIUM SERPL-MCNC: 9.4 MG/DL (ref 8.8–10.2)
CHLORIDE SERPL-SCNC: 102 MMOL/L (ref 98–107)
CREAT SERPL-MCNC: 1.37 MG/DL (ref 0.67–1.17)
DEPRECATED HCO3 PLAS-SCNC: 22 MMOL/L (ref 22–29)
EGFRCR SERPLBLD CKD-EPI 2021: 54 ML/MIN/1.73M2
GLUCOSE SERPL-MCNC: 84 MG/DL (ref 70–99)
POTASSIUM SERPL-SCNC: 4.8 MMOL/L (ref 3.4–5.3)
PROT SERPL-MCNC: 7.3 G/DL (ref 6.4–8.3)
SODIUM SERPL-SCNC: 137 MMOL/L (ref 135–145)
TSH SERPL DL<=0.005 MIU/L-ACNC: 2.53 UIU/ML (ref 0.3–4.2)

## 2024-08-28 ENCOUNTER — TELEPHONE (OUTPATIENT)
Dept: PHARMACY | Facility: OTHER | Age: 74
End: 2024-08-28
Payer: COMMERCIAL

## 2024-08-28 NOTE — TELEPHONE ENCOUNTER
Pt started symptoms 12/26 congestion,  Now having nausea, vomiting on and off, diarrhea, dizziness, weakness .  abd pain, fevers also.    [facility] Recruitment: [facility]     Referral outreach attempt #1 on August 28, 2024      Outcome: left voicemail- Call back number [phone]    Hue Lauren [facility]  -[facility]  [phone]

## 2024-09-03 ENCOUNTER — TELEPHONE (OUTPATIENT)
Dept: PHARMACY | Facility: OTHER | Age: 74
End: 2024-09-03
Payer: COMMERCIAL

## 2024-09-03 NOTE — TELEPHONE ENCOUNTER
[facility] Recruitment: [facility]   part D map  Referral outreach attempt #2 on September 3, 2024      Outcome: left voicemail- Call back number [phone]    Hue Lauren [facility]  -[facility]  [phone]

## 2024-09-13 ENCOUNTER — TELEPHONE (OUTPATIENT)
Dept: PHARMACY | Facility: OTHER | Age: 74
End: 2024-09-13
Payer: COMMERCIAL

## 2024-09-13 NOTE — TELEPHONE ENCOUNTER
[facility] Recruitment: [facility] insurance     Referral outreach attempt #3 on September 13, 2024      Outcome: visit scheduled    Hue Lauren [facility]  -[facility]  [phone]

## 2024-09-16 ENCOUNTER — VIRTUAL VISIT (OUTPATIENT)
Dept: PHARMACY | Facility: CLINIC | Age: 74
End: 2024-09-16
Payer: COMMERCIAL

## 2024-09-16 DIAGNOSIS — I48.91 ATRIAL FIBRILLATION, UNSPECIFIED TYPE (H): ICD-10-CM

## 2024-09-16 DIAGNOSIS — I50.9 CONGESTIVE HEART FAILURE, UNSPECIFIED HF CHRONICITY, UNSPECIFIED HEART FAILURE TYPE (H): ICD-10-CM

## 2024-09-16 DIAGNOSIS — I10 BENIGN ESSENTIAL HYPERTENSION: ICD-10-CM

## 2024-09-16 DIAGNOSIS — I48.91 A-FIB (H): ICD-10-CM

## 2024-09-16 DIAGNOSIS — E78.5 HYPERLIPIDEMIA LDL GOAL <70: ICD-10-CM

## 2024-09-16 DIAGNOSIS — Z78.9 TAKES DIETARY SUPPLEMENTS: ICD-10-CM

## 2024-09-16 DIAGNOSIS — I42.9 CARDIOMYOPATHY, UNSPECIFIED TYPE (H): Primary | ICD-10-CM

## 2024-09-16 NOTE — LETTER
"Recommended To-Do List      Prepared on: Sep 16, 2024       You can get the best results from your medications by completing the items on this \"To-Do List.\"      Bring your To-Do List when you go to your doctor. And, share it with your family or caregivers.    My To-Do List:  What we talked about: What I should do:   Considerations for 9/17 primary care provider appointment:     Discuss potentially changing the medication you are taking from lisinopril (ZESTRIL) to losartan (COZAAR)                  "

## 2024-09-16 NOTE — LETTER
September 16, 2024  Omar Perez  [address] [place] [address]    Dear Mr. Perez, [facility]     Thank you for talking with me on Sep 16, 2024 about your health and medications. As a follow-up to our conversation, I have included two documents:      Your Recommended To-Do List has steps you should take to get the best results from your medications.  Your Medication List will help you keep track of your medications and how to take them.    If you want to talk about these documents, please call Sparkle Montilla RPH at phone: [phone], Monday-Friday 8-4:30pm.    I look forward to working with you and your doctors to make sure your medications work well for you.    Sincerely,  Sparkle Montilla RPH  [facility] Pharmacist, [facility]

## 2024-09-16 NOTE — Clinical Note
Hi! I met with Omar today to review medications with referral per his insurance. I know he is getting his cardiology care through [facility] but he is likely experiencing a cough from lisinopril and may benefit from switching to losartan. There is also concern he may be experiencing gynecomastia as a result of spironolactone therapy. In the cardiology note it looks like they intended to switch him to eplerenone but did not put in a prescription. If time allows, can you assess these further during your visit with him on 9/17?   Thank you,   Sparkle Montilla, PharmD Medication Therapy Management (MTM) Pharmacist  [place] [facility]

## 2024-09-16 NOTE — PROGRESS NOTES
Medication Therapy Management (MTM) Encounter    ASSESSMENT:                            Medication Adherence/Access: No issues identified    Cardiomyopathy/CHF/Hypertension/Atrial Fibrillation   Stable post-ablation. Patient is meeting BP goal of < 130/80 mmHg. Unable to tolerate metoprolol succinate 25mg due to bradycardia. Likely having an adverse reaction of cough to lisinopril, [facility] is aware but has not switched him to ARB therapy. Concern for breast pain potentially associated with gynecomastia and spironolactone therapy, appears Cardiology intended to switch him to eplerenone but did not put in a prescription. Will notify primary care provider for tomorrow's visit to further assess if medication changes are needed.     Hyperlipidemia   Stable. On high intensity statin therapy, last LDL above goal of at least <70. Defer management to [facility] Cardiologist.     Supplements:  Stable.    PLAN:                            Considerations for 9/17 primary care provider appointment:   Lisinopril is likely cause for cough - consider switching to losartan    Spironolactone - if concerned for gynecomastia consider switching to eplerenone    Follow-up: with primary care provider 9/17/24, as needed with MTM pharmacist    SUBJECTIVE/OBJECTIVE:                          Omar Perez is a 74 year old male seen for an initial visit. He was referred to me from his insurance company.      Of note - primary care provider is located at [facility].    Reason for visit: medication review.    Allergies/ADRs: Reviewed in chart  Past Medical History: Reviewed in chart  Tobacco: He reports that he has never smoked. He has never used smokeless tobacco.  Alcohol: Less than 1 beverages / week  Caffeine: coffee (3 cups per day)   Activity: typically swimming 3x/week (not currently post-ablation)    Recent Falls: denies    Medication Adherence/Access: no issues  "reported    Cardiomyopathy/CHF/Hypertension/Atrial Fibrillation   -Per 4/25/24 ECHO, LVEF 39% ---> improved to 48% (7/10/24)   Eliquis 5mg twice daily for stroke prevention - some bruising following ablation but no bleeding     Amiodarone 200 mg daily - for 1 more month then discontinue    Spironolactone 12.5 mg daily   Lisinopril 2.5 mg daily - cough started after medication was started, comes and goes; of note, Cardiology notes mention this side effect related to ACEi though they have continued therapy     Patient reports the following medication side effects: dry cough.    Patient does not have a history of GI bleed.   Patient reports these HF symptoms: none other than cough - likely side effect of lisinopril.    Patient is not measuring daily weights. - lost 17 lbs initially after hospitalization, down around 3 lbs now, denies any significantly increased swelling of his extremities.   Patient does not self-monitor blood pressure.   Followed by Division of Cardiovascular Diseases in [place], [place] ([facility]) - last seen 9/10/24 - new onset atrial fibrillation and atrial flutter in the setting of a febrile illness (4/2024)  Patient underwent PVI and CTI ablation to treat atrial fibrillation atrial flutter on 9/10.  Patient reports breast pain which started once his heart was shocked at [place], does not notice it unless he touches his breast, may potentially be related to spironolactone therapy.  Per 9/9 Cardiology encounter: \"He has noted some breast tissue tenderness from the spironolactone. I was able to discuss with his cardiology provider Veda Wilcox CNP and she was in agreement that he should discontinue. She will prescribe eplerenone in its place.\"        HR on Apple Watch - has been normal since off metoprolol, previously on beta blocker therapy patient reports HR dropping into the 40s, cardiology may be unaware patient is no longer on metoprolol but unsure if his heart rate can tolerate it " (pulse was 52 bpm at 9/10 appointment off metoprolol therapy)     BP Readings from Last 3 Encounters:   06/06/24 116/76   05/29/24 103/62   05/08/24 110/74     From [facility] - @ 09/09/24 0810  SODIUM mmol/L 137  CHLORIDE mmol/L 101  CREATININE mg/dL 1.49*  ESTIMATED GFR EGFR mL/min/BSA 49*  BUN mg/dL 19  ANION GAP 10  GLUCOSE S mg/dL 91  CALCIUM mg/dL 9.5    ECG 12 Lead  Result Date: 9/10/2024  Sinus bradycardia with 1st degree A-V block  Left axis deviation  Nonspecific ST abnormality  When compared with ECG of 09-Sep-2024 08:22,  HI interval has increased     Hyperlipidemia   Atorvastatin 40mg daily  Patient reports no significant myalgias or other side effects.    Lipid Panel per [facility] - 5/17/24  CHOL 149   HDL 41   LDLCALC 87   TRIG 118        Supplements   Vitamin D 50 mcg 2000 units daily - maybe takes once week, trying to be better about taking it routinely    No reported issues at this time.          Today's Vitals: There were no vitals taken for this visit.  ----------------      I spent 40 minutes with this patient today (an extra 15 minutes was spent creating the Medication Action Plan). All changes were made via collaborative practice agreement with PERICO HERRERA MD. A copy of the visit note was provided to the patient's provider(s).    A summary of these recommendations was sent via [employer].    Sparkle Montilla PharmD  Medication Therapy Management (MTM) Pharmacist   [facility]     Telemedicine Visit Details  Type of service:  Telephone visit  Start Time:  1:31 PM  End Time:  2:10 PM     Medication Therapy Recommendations  Congestive heart failure, unspecified HF chronicity, unspecified heart failure type (H)    Current Medication: lisinopril (ZESTRIL) 2.5 MG tablet   Rationale: Allergic reaction - Adverse medication event - Safety   Recommendation: Change Medication - losartan 25 MG tablet   Status: Contact Provider - Awaiting Response

## 2024-09-16 NOTE — LETTER
"_  Medication List        Prepared on: Sep 16, 2024     Bring your Medication List when you go to the doctor, hospital, or   emergency room. And, share it with your family or caregivers.     Note any changes to how you take your medications.  Cross out medications when you no longer use them.    Medication How I take it Why I use it Prescriber   acetaminophen (TYLENOL) 500 MG tablet Take 500-1,000 mg by mouth every 6 hours as needed for mild pain or fever  Pain or fever Patient Reported   amiodarone (PACERONE) 200 MG tablet Take 200 mg by mouth daily. 9/16 - \"1 more month then discontinue\"  Paroxysmal Atrial Fibrillation (H) Patient Reported   apixaban ANTICOAGULANT (ELIQUIS) 5 MG tablet Take 1 tablet (5 mg) by mouth 2 times daily Paroxysmal Atrial Fibrillation (H) Barbra Lopez MD   atorvastatin (LIPITOR) 40 MG tablet Take 1 tablet by mouth at bedtime Cholesterol and heart health Patient Reported   lisinopril (ZESTRIL) 2.5 MG tablet Take 1 tablet (2.5 mg) by mouth daily Cardiomyopathy, unspecified type (H) Barbra Lopez MD   spironolactone (ALDACTONE) 25 MG tablet Take 0.5 tablets (12.5 mg) by mouth daily Cardiomyopathy, unspecified type (H) Kip Echeverria MD, MD   vitamin D3 (CHOLECALCIFEROL) 50 mcg (2000 units) tablet Take 1 tablet by mouth daily as needed  Vitamin D supplement Patient Reported         Add new medications, over-the-counter drugs, herbals, vitamins, or  minerals in the blank rows below.    Medication How I take it Why I use it Prescriber                                      Allergies:      No Known Allergies        Side effects I have had:      No Known Side Effects        Other Information:              My notes and questions:  "

## 2024-09-17 ENCOUNTER — OFFICE VISIT (OUTPATIENT)
Dept: DERMATOLOGY | Facility: CLINIC | Age: 74
End: 2024-09-17
Payer: COMMERCIAL

## 2024-09-17 ENCOUNTER — OFFICE VISIT (OUTPATIENT)
Dept: INTERNAL MEDICINE | Facility: CLINIC | Age: 74
End: 2024-09-17
Payer: COMMERCIAL

## 2024-09-17 VITALS
OXYGEN SATURATION: 99 % | WEIGHT: 223.3 LBS | HEIGHT: 75 IN | RESPIRATION RATE: 14 BRPM | DIASTOLIC BLOOD PRESSURE: 82 MMHG | BODY MASS INDEX: 27.77 KG/M2 | HEART RATE: 60 BPM | TEMPERATURE: 97.6 F | SYSTOLIC BLOOD PRESSURE: 126 MMHG

## 2024-09-17 DIAGNOSIS — I48.19 PERSISTENT ATRIAL FIBRILLATION (H): ICD-10-CM

## 2024-09-17 DIAGNOSIS — N62 GYNECOMASTIA: ICD-10-CM

## 2024-09-17 DIAGNOSIS — B35.1 ONYCHOMYCOSIS: ICD-10-CM

## 2024-09-17 DIAGNOSIS — Z86.79 S/P ABLATION OF ATRIAL FIBRILLATION: ICD-10-CM

## 2024-09-17 DIAGNOSIS — D18.01 ANGIOMA OF SKIN: ICD-10-CM

## 2024-09-17 DIAGNOSIS — Z98.890 S/P ABLATION OF ATRIAL FIBRILLATION: ICD-10-CM

## 2024-09-17 DIAGNOSIS — D48.5 NEOPLASM OF UNCERTAIN BEHAVIOR OF SKIN: ICD-10-CM

## 2024-09-17 DIAGNOSIS — T46.4X5A COUGH DUE TO ACE INHIBITOR: ICD-10-CM

## 2024-09-17 DIAGNOSIS — L82.1 SEBORRHEIC KERATOSIS: ICD-10-CM

## 2024-09-17 DIAGNOSIS — L57.0 ACTINIC KERATOSIS: ICD-10-CM

## 2024-09-17 DIAGNOSIS — D22.9 NEVUS: ICD-10-CM

## 2024-09-17 DIAGNOSIS — B35.3 TINEA PEDIS OF BOTH FEET: Primary | ICD-10-CM

## 2024-09-17 DIAGNOSIS — R05.8 COUGH DUE TO ACE INHIBITOR: ICD-10-CM

## 2024-09-17 DIAGNOSIS — I42.9 CARDIOMYOPATHY, UNSPECIFIED TYPE (H): ICD-10-CM

## 2024-09-17 DIAGNOSIS — Z09 HOSPITAL DISCHARGE FOLLOW-UP: Primary | ICD-10-CM

## 2024-09-17 DIAGNOSIS — L81.4 LENTIGO: ICD-10-CM

## 2024-09-17 PROCEDURE — 99203 OFFICE O/P NEW LOW 30 MIN: CPT | Mod: 25 | Performed by: PHYSICIAN ASSISTANT

## 2024-09-17 PROCEDURE — G0008 ADMIN INFLUENZA VIRUS VAC: HCPCS | Performed by: INTERNAL MEDICINE

## 2024-09-17 PROCEDURE — 88305 TISSUE EXAM BY PATHOLOGIST: CPT | Performed by: DERMATOLOGY

## 2024-09-17 PROCEDURE — 11102 TANGNTL BX SKIN SINGLE LES: CPT | Performed by: PHYSICIAN ASSISTANT

## 2024-09-17 PROCEDURE — 90662 IIV NO PRSV INCREASED AG IM: CPT | Performed by: INTERNAL MEDICINE

## 2024-09-17 PROCEDURE — 17000 DESTRUCT PREMALG LESION: CPT | Mod: 59 | Performed by: PHYSICIAN ASSISTANT

## 2024-09-17 PROCEDURE — 99213 OFFICE O/P EST LOW 20 MIN: CPT | Mod: 25 | Performed by: INTERNAL MEDICINE

## 2024-09-17 PROCEDURE — 17003 DESTRUCT PREMALG LES 2-14: CPT | Performed by: PHYSICIAN ASSISTANT

## 2024-09-17 RX ORDER — CICLOPIROX 80 MG/ML
SOLUTION TOPICAL
Qty: 6.6 ML | Refills: 5 | Status: SHIPPED | OUTPATIENT
Start: 2024-09-17

## 2024-09-17 RX ORDER — KETOCONAZOLE 20 MG/G
CREAM TOPICAL
Qty: 60 G | Refills: 5 | Status: SHIPPED | OUTPATIENT
Start: 2024-09-17

## 2024-09-17 ASSESSMENT — PAIN SCALES - GENERAL
PAINLEVEL: NO PAIN (0)
PAINLEVEL: NO PAIN (0)

## 2024-09-17 NOTE — PATIENT INSTRUCTIONS
Wound Care Instructions     FOR SUPERFICIAL WOUNDS     [facility]  [phone]                 AFTER 24 HOURS YOU SHOULD REMOVE THE BANDAGE AND BEGIN DAILY DRESSING CHANGES AS FOLLOWS:     1) Remove Dressing.     2) Clean and dry the area with tap water using a Q-tip or sterile gauze pad.     3) Apply Vaseline, Aquaphor, Polysporin ointment or Bacitracin ointment over entire wound.  Do NOT use Neosporin ointment.     4) Cover the wound with a band-aid, or a sterile non-stick gauze pad and micropore paper tape    REPEAT THESE INSTRUCTIONS AT LEAST ONCE A DAY UNTIL THE WOUND HAS COMPLETELY HEALED.    It is an old wives tale that a wound heals better when it is exposed to air and allowed to dry out. The wound will heal faster with a better cosmetic result if it is kept moist with ointment and covered with a bandage.    **Do not let the wound dry out.**    Supplies Needed:      *Cotton tipped applicators (Q-tips)    *Vaseline, Aquaphor, Polysporin or Bacitracin Ointment (NOT NEOSPORIN)    *Band-aids or non-stick gauze pads and micropore paper tape.      PATIENT INFORMATION:    During the healing process you will notice a number of changes. All wounds develop a small halo of redness surrounding the wound.  This means healing is occurring. Severe itching with extensive redness usually indicates sensitivity to the ointment or bandage tape used to dress the wound.  You should call our office if this develops.      Swelling  and/or discoloration around your surgical site is common, particularly when performed around the eye.    All wounds normally drain.  The larger the wound the more drainage there will be.  After 7-10 days, you will notice the wound beginning to shrink and new skin will begin to grow.  The wound is healed when you can see skin has formed over the entire area.  A healed wound has a healthy, shiny look to the surface and is red to dark pink in color to normalize.  Wounds may  take approximately 4-6 weeks to heal.  Larger wounds may take 6-8 weeks.  After the wound is healed you may discontinue dressing changes.    You may experience a sensation of tightness as your wound heals. This is normal and will gradually subside.    Your healed wound may be sensitive to temperature changes. This sensitivity improves with time, but if you re having a lot of discomfort, try to avoid temperature extremes.    Patients frequently experience itching after their wound appears to have healed because of the continue healing under the skin.  Plain Vaseline will help relieve the itching.      POSSIBLE COMPLICATIONS    BLEEDING:    Leave the bandage in place.  Use tightly rolled up gauze or a cloth to apply direct pressure over the bandage for 30  minutes.  Reapply pressure for an additional 30 minutes if necessary  Use additional gauze and tape to maintain pressure once the bleeding has stopped.

## 2024-09-17 NOTE — PROGRESS NOTES
HPI:   Chief complaints: Omar Perez is a pleasant 74 year old male who presents for Full skin cancer screening to rule out skin cancer   Last Skin Exam: n/a      1st Baseline: yes  Personal HX of Skin Cancer: no   Personal HX of Malignant Melanoma: no   Family HX of Skin Cancer / Malignant Melanoma: no  Personal HX of Atypical Moles:   no  Risk factors: history of sun exposure and burns  New / Changing lesions:yes few scaly spots  Social History:   On review of systems, there are no further skin complaints, patient is feeling otherwise well.   ROS of the following were done and are negative: Constitutional, Eyes, Ears, Nose,   Mouth, Throat, Cardiovascular, Respiratory, GI, Genitourinary, Musculoskeletal,   Psychiatric, Endocrine, Allergic/Immunologic.    PHYSICAL EXAM:   There were no vitals taken for this visit.  Skin exam performed as follows: Type 2 skin. Mood appropriate  Alert and Oriented X 3. Well developed, well nourished in no distress.  General appearance: Normal  Head including face: Normal  Eyes: conjunctiva and lids: Normal  Mouth: Lips, teeth, gums: Normal  Neck: Normal  Chest-breast/axillae: Normal  Back: Normal  Extremities: digits/nails (clubbing): Normal  Eccrine and Apocrine glands: Normal  Right upper extremity: Normal  Left upper extremity: Normal  Right lower extremity: Normal  Left lower extremity: Normal  Skin: Scalp and body hair: See below    Pt deferred exam of breasts, groin, buttocks: No    Other physical findings:  1. Multiple pigmented macules on extremities and trunk  2. Multiple pigmented macules on face, trunk and extremities  3. Multiple vascular papules on trunk, arms and legs  4. Multiple scattered keratotic plaques  5. Pink gritty papule on the left antitragus x 1, left forehead x 1, left lateral forehead x 2, left supra brow x 1, right forehead x 1, right supra brow x 1  6. 6 mm irregular brown macule on the left upper arm  7. Thickened toenails with subungual debris on  all toenails  8. Peeling and erythema on bilateral feet.        Except as noted above, no other signs of skin cancer or melanoma.     ASSESSMENT/PLAN:   Benign Full skin cancer screening today. . Patient with history of none  Advised on monthly self exams and 1 year  Patient Education: Appropriate brochures given.    Multiple benign appearing melanocytic nevi on arms, legs and trunk. Discussed ABCDEs of melanoma and sunscreen.   Multiple lentigos on arms, legs and trunk. Advised benign, no treatment needed.  Multiple scattered angiomas. Advised benign, no treatment needed.   Seborrheic keratosis on arms, legs and trunk. Advised benign, no treatment needed.  Actinic keratosis on the left antitragus x 1, left forehead x 1, left lateral forehead x 2, left supra brow x 1, right forehead x 1, right supra brow x 1. As precancerous, cryosurgery performed. Advised on blistering and post-op care. Advised if not resolved in 1-2 months to return for evaluation  R/o atypical nevus on the left upper arm. Shave biopsy performed.  Area cleaned and anesthetized with 1% lidocaine with epinephrine.  Dermablade used to remove the lesion and sent to pathology. Bleeding was cauterized. Pt tolerated procedure well with no complications.   Onychomycosis - would prefer to avoid pills at this point. Also recommend against lamisil due to recent ablation.   --Start penlac daily  Tinea pedis on both feet  --Start ketoconazole cream BID x 3 weeks then PRN thereafter          Follow-up: yearly/PRN sooner    1.) Patient was asked about new and changing moles. YES  2.) Patient received a complete physical skin examination: YES  3.) Patient was counseled to perform a monthly self skin examination: YES  Scribed By: Aniyah Fermin, MS, PA-C

## 2024-09-17 NOTE — PROGRESS NOTES
1. Hospital discharge follow-up  He is doing quite well after his ablation.  Continue regimen and I will see him back next year for his annual wellness.    2. S/P ablation of atrial fibrillation  Doing well.  Medications per cardiology.    3. Persistent atrial fibrillation (H)  As above.  Hopefully they can get him off the amiodarone.    4. Cardiomyopathy, unspecified type (H)  I note that his EF is gone up to the 48 to 50% range which is excellent.  This was on echo done in July.    5. Gynecomastia  Likely due to spironolactone.  Discuss further with cardiology provider.    6. Cough due to ACE inhibitor  As above.  Will discuss further with his cardiology provider.    Curtis Nelson is a 74 year old, presenting for the following health issues:  Hospital F/U (follow up. Admit/discharged same day 9/10/24, [facility]) and Derm Problem (Saw [facility] dermatology this morning. )        9/17/2024    11:23 AM   Additional Questions   Roomed by Heather     [facility] Follow-up Visit:    Hospital:  [facility]  Date of Admission: 9/10/24    Date of Discharge: 09/10/24  Reason(s) for Admission: Ablation  Was the patient in the ICU or did the patient experience delirium during hospitalization?  No  Do you have any other stressors you would like to discuss with your provider? No    Problems taking medications regularly:  None  Medication changes since discharge: metoprolol d/cd  Problems adhering to non-medication therapy:  None    Summary of hospitalization:  CareEverywhere information obtained and reviewed  Diagnostic Tests/Treatments reviewed.  Follow up needed: cardiology  Other Healthcare Providers Involved in Patient s Care:         Specialist appointment - next week  Update since discharge: improved.         Plan of care communicated with patient                 Roni comes today for hospital follow-up.  He was discharged a week ago from [facility].  He was admitted for  "ablation of his A-fib.  Omar had a viral likely illness last winter which was quite profound.  Left him with a significant heart cardiomyopathy with an EF in the 30% range.  He developed A-fib around that time.  He is being followed by cardiology and although they were able to successfully cardiovert him, he has remained on amiodarone and an attempt was made for ablation so that he does not have to take his many medications.  He has done well with this.  He tolerated the procedure without difficulty and tells me he feels quite good.  He has not got back into all of his regular exercise but hopes to do so soon.  He does note some mild gynecomastia from the spironolactone presumably and a dry tickle cough from the ACE inhibitor.  He did discuss that with his providers at [facility] but they have not changed medications as of yet.  He will be seeing them next week and will discuss again further with them.      Objective    /82 (BP Location: Left arm, Patient Position: Sitting, Cuff Size: Adult Regular)   Pulse 60   Temp 97.6  F (36.4  C) (Oral)   Resp 14   Ht 1.905 m (6' 3\")   Wt 101.3 kg (223 lb 4.8 oz)   SpO2 99%   BMI 27.91 kg/m    Body mass index is 27.91 kg/m .  Physical Exam   Pleasant gentleman who looks well.  Heart is regular today            Signed Electronically by: PERICO HERRERA MD    "

## 2024-09-17 NOTE — PATIENT INSTRUCTIONS
"Recommendations from today's MTM visit:                                                    MTM (medication therapy management) is a service provided by a clinical pharmacist designed to help you get the most of out of your medicines.   Today we reviewed what your medicines are for, how to know if they are working, that your medicines are safe and how to make your medicine regimen as easy as possible.      Considerations for 9/17 primary care provider appointment:   Lisinopril is likely cause for cough - consider switching to losartan    Spironolactone - if concerned for gynecomastia consider switching to eplerenone    Follow-up: Return in about 1 day (around 9/17/2024) for Follow up with primary care provider, then as needed with MTM pharmacist.    It was great speaking with you today.  I value your experience and would be very thankful for your time in providing feedback in our clinic survey. In the next few days, you may receive an email or text message from [employer] with a link to a survey related to your  clinical pharmacist.\"     To schedule another MTM appointment, please call the clinic directly or you may call the MTM scheduling line at [phone].    My Clinical Pharmacist's contact information:                                                      Please feel free to contact me with any questions or concerns you have.      Sparkle Montilla, Georgette  Medication Therapy Management (MTM) Pharmacist   [facility]     "

## 2024-09-17 NOTE — LETTER
9/17/2024      Omar Perez  [address]  [place] [address]      Dear Colleague,    Thank you for referring your patient, Omar Perez, to the [facility]. Please see a copy of my visit note below.    HPI:   Chief complaints: Omar Perez is a pleasant 74 year old male who presents for Full skin cancer screening to rule out skin cancer   Last Skin Exam: n/a      1st Baseline: yes  Personal HX of Skin Cancer: no   Personal HX of Malignant Melanoma: no   Family HX of Skin Cancer / Malignant Melanoma: no  Personal HX of Atypical Moles:   no  Risk factors: history of sun exposure and burns  New / Changing lesions:yes few scaly spots  Social History:   On review of systems, there are no further skin complaints, patient is feeling otherwise well.   ROS of the following were done and are negative: Constitutional, Eyes, Ears, Nose,   Mouth, Throat, Cardiovascular, Respiratory, GI, Genitourinary, Musculoskeletal,   Psychiatric, Endocrine, Allergic/Immunologic.    PHYSICAL EXAM:   There were no vitals taken for this visit.  Skin exam performed as follows: Type 2 skin. Mood appropriate  Alert and Oriented X 3. Well developed, well nourished in no distress.  General appearance: Normal  Head including face: Normal  Eyes: conjunctiva and lids: Normal  Mouth: Lips, teeth, gums: Normal  Neck: Normal  Chest-breast/axillae: Normal  Back: Normal  Extremities: digits/nails (clubbing): Normal  Eccrine and Apocrine glands: Normal  Right upper extremity: Normal  Left upper extremity: Normal  Right lower extremity: Normal  Left lower extremity: Normal  Skin: Scalp and body hair: See below    Pt deferred exam of breasts, groin, buttocks: No    Other physical findings:  1. Multiple pigmented macules on extremities and trunk  2. Multiple pigmented macules on face, trunk and extremities  3. Multiple vascular papules on trunk, arms and legs  4. Multiple scattered keratotic plaques  5. Pink  gritty papule on the left antitragus x 1, left forehead x 1, left lateral forehead x 2, left supra brow x 1, right forehead x 1, right supra brow x 1  6. 6 mm irregular brown macule on the left upper arm  7. Thickened toenails with subungual debris on all toenails  8. Peeling and erythema on bilateral feet.        Except as noted above, no other signs of skin cancer or melanoma.     ASSESSMENT/PLAN:   Benign Full skin cancer screening today. . Patient with history of none  Advised on monthly self exams and 1 year  Patient Education: Appropriate brochures given.    Multiple benign appearing melanocytic nevi on arms, legs and trunk. Discussed ABCDEs of melanoma and sunscreen.   Multiple lentigos on arms, legs and trunk. Advised benign, no treatment needed.  Multiple scattered angiomas. Advised benign, no treatment needed.   Seborrheic keratosis on arms, legs and trunk. Advised benign, no treatment needed.  Actinic keratosis on the left antitragus x 1, left forehead x 1, left lateral forehead x 2, left supra brow x 1, right forehead x 1, right supra brow x 1. As precancerous, cryosurgery performed. Advised on blistering and post-op care. Advised if not resolved in 1-2 months to return for evaluation  R/o atypical nevus on the left upper arm. Shave biopsy performed.  Area cleaned and anesthetized with 1% lidocaine with epinephrine.  Dermablade used to remove the lesion and sent to pathology. Bleeding was cauterized. Pt tolerated procedure well with no complications.   Onychomycosis - would prefer to avoid pills at this point. Also recommend against lamisil due to recent ablation.   --Start penlac daily  Tinea pedis on both feet  --Start ketoconazole cream BID x 3 weeks then PRN thereafter          Follow-up: yearly/PRN sooner    1.) Patient was asked about new and changing moles. YES  2.) Patient received a complete physical skin examination: YES  3.) Patient was counseled to perform a monthly self skin examination:  YES  Scribed By: Aniyah Fermin, MS, PAJdC      Again, thank you for allowing me to participate in the care of your patient.        Sincerely,        Aniyah Fermin PA-C

## 2024-09-19 LAB
PATH REPORT.COMMENTS IMP SPEC: NORMAL
PATH REPORT.COMMENTS IMP SPEC: NORMAL
PATH REPORT.FINAL DX SPEC: NORMAL
PATH REPORT.GROSS SPEC: NORMAL
PATH REPORT.MICROSCOPIC SPEC OTHER STN: NORMAL
PATH REPORT.RELEVANT HX SPEC: NORMAL

## 2024-09-30 ENCOUNTER — NURSE TRIAGE (OUTPATIENT)
Dept: INTERNAL MEDICINE | Facility: CLINIC | Age: 74
End: 2024-09-30
Payer: COMMERCIAL

## 2024-09-30 NOTE — TELEPHONE ENCOUNTER
Nurse Triage SBAR    Is this a 2nd Level Triage? YES, LICENSED PRACTITIONER REVIEW IS REQUIRED    Situation: COVID-19     Background: History of dilatation of thoracic aorta, acute kidney injury, atrial fibrillation (s/p catheter ablation 9/10/24). Patient seen by Dr. Hernandez 9/17/24. Prescribed Eliquis and amiodarone.     Assessment: Patient reports positive COVID-19 test today 9/30/24. Symptom onset 9/28/24. Reports symptoms of runny nose, congestion, productive cough. Felt feverish yesterday, did not take temperature, took Tylenol which improved symptoms. Denies headache. Denies difficulty breathing.     Protocol Recommended Disposition:   Discuss With PCP And Callback By Nurse Within 1 Hour    Recommendation: Patient would like Dr. Torrez advice on next steps for COVID-19. Informed patient Dr. Hernandez would be notified and we would return his call with recommendations.     Routed to provider    Does the patient meet one of the following criteria for ADS visit consideration? 16+ years old, with an [facility] PCP     TIP  Providers, please consider if this condition is appropriate for management at one of our Acute and Diagnostic Services sites.     If patient is a good candidate, please use dotphrase <dot>triageresponse and select Refer to ADS to document.      Reason for Disposition   HIGH RISK patient (e.g., weak immune system, age > 64 years, obesity with BMI of 30 or higher, pregnant, chronic lung disease or other chronic medical condition) and COVID symptoms (e.g., cough, fever)  (Exceptions: Already seen by doctor or NP/PA and no new or worsening symptoms.)    Additional Information   Negative: SEVERE difficulty breathing (e.g., struggling for each breath, speaks in single words)   Negative: Difficult to awaken or acting confused (e.g., disoriented, slurred speech)   Negative: Bluish (or gray) lips or face now   Negative: Shock suspected (e.g., cold/pale/clammy skin, too weak to stand, low BP, rapid pulse)    Negative: Sounds like a life-threatening emergency to the triager   Negative: Diagnosed or suspected COVID-19 and symptoms lasting 3 or more weeks   Negative: COVID-19 exposure and no symptoms   Negative: COVID-19 vaccine reaction suspected (e.g., fever, headache, muscle aches) occurring 1 to 3 days after getting vaccine   Negative: COVID-19 vaccine, questions about   Negative: Lives with someone known to have influenza (flu test positive) and flu-like symptoms (e.g., cough, runny nose, sore throat, SOB; with or without fever)   Negative: Possible COVID-19 symptoms and triager concerned about severity of symptoms or other causes   Negative: COVID-19 and breastfeeding, questions about   Negative: SEVERE or constant chest pain or pressure  (Exception: Mild central chest pain, present only when coughing.)   Negative: MODERATE difficulty breathing (e.g., speaks in phrases, SOB even at rest, pulse 100-120)   Negative: Headache and stiff neck (can't touch chin to chest)   Negative: Oxygen level (e.g., pulse oximetry) 90% or lower   Negative: Chest pain or pressure  (Exception: MILD central chest pain, present only when coughing.)   Negative: Drinking very little and dehydration suspected (e.g., no urine > 12 hours, very dry mouth, very lightheaded)   Negative: Patient sounds very sick or weak to the triager   Negative: MILD difficulty breathing (e.g., minimal/no SOB at rest, SOB with walking, pulse <100)   Negative: Fever > 103 F (39.4 C)   Negative: Fever > 101 F (38.3 C) and over 60 years of age   Negative: Fever > 100.0 F (37.8 C) and bedridden (e.g., CVA, chronic illness, recovering from surgery)    Protocols used: Coronavirus (COVID-19) Diagnosed or [id number]

## 2024-09-30 NOTE — TELEPHONE ENCOUNTER
Symptomatic over-the-counter treatments only.  If would have worrisome findings such as shortness of breath or hypoxia he would need to go to emergency room.

## 2024-09-30 NOTE — TELEPHONE ENCOUNTER
Patient returning call to clinic. Relayed below message from provider. Patient verbalizes understanding.

## 2025-02-04 NOTE — PLAN OF CARE
Goal Outcome Evaluation:      Plan of Care Reviewed With: patient    Overall Patient Progress: improvingOverall Patient Progress: improving    Outcome Evaluation: Elevated HR which is manage with IV and PO meds, Afib on tele . Cooperative but anxious.       Problem: Adult Inpatient Plan of Care  Goal: Absence of Hospital-Acquired Illness or Injury  Intervention: Prevent Skin Injury  Recent Flowsheet Documentation  Taken 4/28/2024 0940 by Sydnee Donahue RN  Body Position: position changed independently     Problem: Risk for Delirium  Goal: Improved Sleep  Outcome: Progressing     Problem: Dysrhythmia  Goal: Normalized Cardiac Rhythm  Outcome: Progressing      No

## 2025-04-14 ENCOUNTER — OFFICE VISIT (OUTPATIENT)
Dept: INTERNAL MEDICINE | Facility: CLINIC | Age: 75
End: 2025-04-14
Payer: COMMERCIAL

## 2025-04-14 VITALS
SYSTOLIC BLOOD PRESSURE: 137 MMHG | BODY MASS INDEX: 28.63 KG/M2 | TEMPERATURE: 97.7 F | DIASTOLIC BLOOD PRESSURE: 86 MMHG | HEART RATE: 56 BPM | OXYGEN SATURATION: 96 % | HEIGHT: 75 IN | WEIGHT: 230.3 LBS

## 2025-04-14 DIAGNOSIS — R35.1 BPH ASSOCIATED WITH NOCTURIA: ICD-10-CM

## 2025-04-14 DIAGNOSIS — Z00.00 ENCOUNTER FOR MEDICARE ANNUAL WELLNESS EXAM: Primary | ICD-10-CM

## 2025-04-14 DIAGNOSIS — M62.81 GENERALIZED MUSCLE WEAKNESS: ICD-10-CM

## 2025-04-14 DIAGNOSIS — I10 BENIGN ESSENTIAL HYPERTENSION: ICD-10-CM

## 2025-04-14 DIAGNOSIS — R97.20 ELEVATED PSA: ICD-10-CM

## 2025-04-14 DIAGNOSIS — R93.1 AGATSTON CORONARY ARTERY CALCIUM SCORE LESS THAN 100: ICD-10-CM

## 2025-04-14 DIAGNOSIS — Z79.01 LONG TERM CURRENT USE OF ANTICOAGULANT THERAPY: ICD-10-CM

## 2025-04-14 DIAGNOSIS — Z86.79 HISTORY OF ATRIAL FIBRILLATION: ICD-10-CM

## 2025-04-14 DIAGNOSIS — E27.9 ADRENAL NODULE: ICD-10-CM

## 2025-04-14 DIAGNOSIS — I77.810 DILATION OF THORACIC AORTA: ICD-10-CM

## 2025-04-14 DIAGNOSIS — N40.1 BPH ASSOCIATED WITH NOCTURIA: ICD-10-CM

## 2025-04-14 PROBLEM — N17.9 AKI (ACUTE KIDNEY INJURY): Status: RESOLVED | Noted: 2024-04-25 | Resolved: 2025-04-14

## 2025-04-14 LAB
ALBUMIN SERPL BCG-MCNC: 4.3 G/DL (ref 3.5–5.2)
ALBUMIN UR-MCNC: NEGATIVE MG/DL
ALP SERPL-CCNC: 78 U/L (ref 40–150)
ALT SERPL W P-5'-P-CCNC: 19 U/L (ref 0–70)
ANION GAP SERPL CALCULATED.3IONS-SCNC: 8 MMOL/L (ref 7–15)
APPEARANCE UR: CLEAR
AST SERPL W P-5'-P-CCNC: 25 U/L (ref 0–45)
BILIRUB SERPL-MCNC: 0.5 MG/DL
BILIRUB UR QL STRIP: NEGATIVE
BUN SERPL-MCNC: 17 MG/DL (ref 8–23)
CALCIUM SERPL-MCNC: 9.7 MG/DL (ref 8.8–10.4)
CHLORIDE SERPL-SCNC: 105 MMOL/L (ref 98–107)
CHOLEST SERPL-MCNC: 117 MG/DL
CK SERPL-CCNC: 95 U/L (ref 39–308)
COLOR UR AUTO: YELLOW
CREAT SERPL-MCNC: 1.25 MG/DL (ref 0.67–1.17)
EGFRCR SERPLBLD CKD-EPI 2021: 60 ML/MIN/1.73M2
ERYTHROCYTE [DISTWIDTH] IN BLOOD BY AUTOMATED COUNT: 12.5 % (ref 10–15)
FASTING STATUS PATIENT QL REPORTED: ABNORMAL
FASTING STATUS PATIENT QL REPORTED: NORMAL
GLUCOSE SERPL-MCNC: 97 MG/DL (ref 70–99)
GLUCOSE UR STRIP-MCNC: NEGATIVE MG/DL
HCO3 SERPL-SCNC: 27 MMOL/L (ref 22–29)
HCT VFR BLD AUTO: 42.9 % (ref 40–53)
HDLC SERPL-MCNC: 53 MG/DL
HGB BLD-MCNC: 14.4 G/DL (ref 13.3–17.7)
HGB UR QL STRIP: NEGATIVE
KETONES UR STRIP-MCNC: NEGATIVE MG/DL
LDLC SERPL CALC-MCNC: 47 MG/DL
LEUKOCYTE ESTERASE UR QL STRIP: NEGATIVE
MCH RBC QN AUTO: 30.7 PG (ref 26.5–33)
MCHC RBC AUTO-ENTMCNC: 33.6 G/DL (ref 31.5–36.5)
MCV RBC AUTO: 92 FL (ref 78–100)
NITRATE UR QL: NEGATIVE
NONHDLC SERPL-MCNC: 64 MG/DL
PH UR STRIP: 6 [PH] (ref 5–8)
PLATELET # BLD AUTO: 204 10E3/UL (ref 150–450)
POTASSIUM SERPL-SCNC: 4.7 MMOL/L (ref 3.4–5.3)
PROT SERPL-MCNC: 7.2 G/DL (ref 6.4–8.3)
PSA SERPL DL<=0.01 NG/ML-MCNC: 6.81 NG/ML (ref 0–6.5)
RBC # BLD AUTO: 4.69 10E6/UL (ref 4.4–5.9)
SODIUM SERPL-SCNC: 140 MMOL/L (ref 135–145)
SP GR UR STRIP: 1.02 (ref 1–1.03)
TRIGL SERPL-MCNC: 86 MG/DL
TSH SERPL DL<=0.005 MIU/L-ACNC: 1.56 UIU/ML (ref 0.3–4.2)
UROBILINOGEN UR STRIP-ACNC: 0.2 E.U./DL
WBC # BLD AUTO: 5.1 10E3/UL (ref 4–11)

## 2025-04-14 PROCEDURE — G2211 COMPLEX E/M VISIT ADD ON: HCPCS | Performed by: INTERNAL MEDICINE

## 2025-04-14 PROCEDURE — 81003 URINALYSIS AUTO W/O SCOPE: CPT | Performed by: INTERNAL MEDICINE

## 2025-04-14 PROCEDURE — 84153 ASSAY OF PSA TOTAL: CPT | Performed by: INTERNAL MEDICINE

## 2025-04-14 PROCEDURE — 90480 ADMN SARSCOV2 VAC 1/ONLY CMP: CPT | Performed by: INTERNAL MEDICINE

## 2025-04-14 PROCEDURE — 85027 COMPLETE CBC AUTOMATED: CPT | Performed by: INTERNAL MEDICINE

## 2025-04-14 PROCEDURE — 80053 COMPREHEN METABOLIC PANEL: CPT | Performed by: INTERNAL MEDICINE

## 2025-04-14 PROCEDURE — G0439 PPPS, SUBSEQ VISIT: HCPCS | Performed by: INTERNAL MEDICINE

## 2025-04-14 PROCEDURE — 3079F DIAST BP 80-89 MM HG: CPT | Performed by: INTERNAL MEDICINE

## 2025-04-14 PROCEDURE — 1126F AMNT PAIN NOTED NONE PRSNT: CPT | Performed by: INTERNAL MEDICINE

## 2025-04-14 PROCEDURE — 91320 SARSCV2 VAC 30MCG TRS-SUC IM: CPT | Performed by: INTERNAL MEDICINE

## 2025-04-14 PROCEDURE — 36415 COLL VENOUS BLD VENIPUNCTURE: CPT | Performed by: INTERNAL MEDICINE

## 2025-04-14 PROCEDURE — 80061 LIPID PANEL: CPT | Performed by: INTERNAL MEDICINE

## 2025-04-14 PROCEDURE — 99214 OFFICE O/P EST MOD 30 MIN: CPT | Mod: 25 | Performed by: INTERNAL MEDICINE

## 2025-04-14 PROCEDURE — 3075F SYST BP GE 130 - 139MM HG: CPT | Performed by: INTERNAL MEDICINE

## 2025-04-14 PROCEDURE — 84443 ASSAY THYROID STIM HORMONE: CPT | Performed by: INTERNAL MEDICINE

## 2025-04-14 PROCEDURE — 82550 ASSAY OF CK (CPK): CPT | Performed by: INTERNAL MEDICINE

## 2025-04-14 SDOH — HEALTH STABILITY: PHYSICAL HEALTH: ON AVERAGE, HOW MANY DAYS PER WEEK DO YOU ENGAGE IN MODERATE TO STRENUOUS EXERCISE (LIKE A BRISK WALK)?: 4 DAYS

## 2025-04-14 SDOH — HEALTH STABILITY: PHYSICAL HEALTH: ON AVERAGE, HOW MANY MINUTES DO YOU ENGAGE IN EXERCISE AT THIS LEVEL?: 40 MIN

## 2025-04-14 ASSESSMENT — PAIN SCALES - GENERAL: PAINLEVEL_OUTOF10: NO PAIN (0)

## 2025-04-14 ASSESSMENT — SOCIAL DETERMINANTS OF HEALTH (SDOH): HOW OFTEN DO YOU GET TOGETHER WITH FRIENDS OR RELATIVES?: MORE THAN THREE TIMES A WEEK

## 2025-04-14 NOTE — PROGRESS NOTES
"Preventive Care Visit  [facility]  PERICO HERRERA MD, Internal Medicine  Apr 14, 2025      Assessment & Plan         1. Encounter for Medicare annual wellness exam (Primary)  He lives a very active and healthy lifestyle.  Continue same.  I will see him back yearly.    2. Benign essential hypertension  Blood pressure not an issue at this time.  - Comprehensive metabolic panel (BMP + Alb, Alk Phos, ALT, AST, Total. Bili, TP); Future    3. History of atrial fibrillation  He remains in sinus rhythm.  He will follow-up with cardiology this year later.    4. Generalized muscle weakness  Trial off of statin to see how he does  - TSH with free T4 reflex; Future  - CK total; Future    5. Dilation of thoracic aorta  Will repeat an MRI next year.    6. Adrenal nodule  No further imaging needed per previous radiology reports.    7. BPH associated with nocturia  Stable symptoms.  Check PSA.  - PSA, tumor marker; Future  - UA Macroscopic with reflex to Microscopic and Culture - Lab Collect; Future    8. Elevated PSA  Due for PSA.  - PSA, tumor marker; Future    9. Long term current use of anticoagulant therapy  We discussed the risks involved with this.  He will follow-up with cardiology as planned.  - CBC with platelets; Future    10. Agatston coronary artery calcium score less than 100  Lipids for monitoring.  His coronary artery calcium score was quite low.  Trial off of the statin for a while to see if it helps his perceived weakness.  - Lipid panel reflex to direct LDL Fasting; Future  - CK total; Future         BMI  Estimated body mass index is 28.59 kg/m  as calculated from the following:    Height as of this encounter: 1.911 m (6' 3.25\").    Weight as of this encounter: 104.5 kg (230 lb 4.8 oz).       Counseling  Appropriate preventive services were addressed with this patient via screening, questionnaire, or discussion as appropriate for fall prevention, nutrition, physical activity, Tobacco-use " cessation, social engagement, weight loss and cognition.  Checklist reviewing preventive services available has been given to the patient.  Reviewed patient's diet, addressing concerns and/or questions.           Curtis Nelson is a 74 year old, presenting for the following:  Physical (Pt reports they are here for AWV. Reports had a-fib in April 2024 and was hospitalized for 5 days. Ablation done in September 2024. )        4/14/2025    10:07 AM   Additional Questions   Roomed by Mireya   Accompanied by alone         4/14/2025    10:07 AM   Patient Reported Additional Medications   Patient reports taking the following new medications none           GUERO Nelson comes in today for follow-up.  He underwent ablation of his A-fib and is doing remarkably well.  His EF is returned to normal.  He feels quite good.  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  He did take a fall while skiing this year and had a large hematoma it sounds like.  Otherwise things are going well.  He notes he has a vague weakness or decrease in his exercise tolerance.  He thinks maybe it is just age-related though.  He remains extremely active with swimming.  He is taking a bunch of trees on his property etc.  Daughter is in a skilled nursing house.  She has chronic alcoholism.  Other daughter is maybe looking for a new job it sounds like.  She is an  in [place].  4 grandkids are doing well.  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      Advance Care Planning  Patient does not have a Health Care Directive: Advance Directive received and scanned. Click on Code in the patient header to view.      4/14/2025   General Health   How would you rate your overall physical health? Good   Feel stress (tense, anxious, or unable to sleep) Only a little   (!) STRESS CONCERN      4/14/2025   Nutrition   Diet: Regular (no restrictions)         4/14/2025   Exercise   Days  per week of moderate/strenous exercise 4 days   Average minutes spent exercising at this level 40 min         4/14/2025   Social Factors   Frequency of gathering with friends or relatives More than three times a week   Worry food won't last until get money to buy more No   Food not last or not have enough money for food? No   Do you have housing? (Housing is defined as stable permanent housing and does not include staying ouside in a car, in a tent, in an abandoned building, in an overnight shelter, or couch-surfing.) Yes   Are you worried about losing your housing? No   Lack of transportation? No   Unable to get utilities (heat,electricity)? No         4/14/2025   Fall Risk   Fallen 2 or more times in the past year? No    No   Trouble with walking or balance? No    No       Multiple values from one day are sorted in reverse-chronological order          4/14/2025   Activities of Daily Living- Home Safety   Needs help with the following daily activites None of the above   Safety concerns in the home None of the above         4/14/2025   Dental   Dentist two times every year? Yes         4/14/2025   Hearing Screening   Hearing concerns? None of the above         4/14/2025   Driving Risk Screening   Patient/family members have concerns about driving No         4/14/2025   General Alertness/Fatigue Screening   Have you been more tired than usual lately? (!) DECLINE         4/14/2025   Urinary Incontinence Screening   Bothered by leaking urine in past 6 months No           4/10/2024   TB Screening   Were you born outside of the [place]? No           Today's PHQ-2 Score:       4/14/2025    10:04 AM   PHQ-2 ( 1999 Pf[employer])   Q1: Little interest or pleasure in doing things 0   Q2: Feeling down, depressed or hopeless 0   PHQ-2 Score 0    Q1: Little interest or pleasure in doing things Not at all   Q2: Feeling down, depressed or hopeless Not at all   PHQ-2 Score 0       Patient-reported           4/14/2025   Substance Use   Alcohol  more than 3/day or more than 7/wk No   Do you have a current opioid prescription? No   How severe/bad is pain from 1 to 10? 0/10 (No Pain)   Do you use any other substances recreationally? No     Social History     Tobacco Use    Smoking status: Never     Passive exposure: Past    Smokeless tobacco: Never   Vaping Use    Vaping status: Never Used   Substance Use Topics    Alcohol use: Yes     Alcohol/week: 1.0 standard drink of alcohol     Types: 1 Standard drinks or equivalent per week    Drug use: Never           4/14/2025   AAA Screening   Family history of Abdominal Aortic Aneurysm (AAA)? No   ASCVD Risk   The ASCVD Risk score (Koby BAUGH, et al., 2019) failed to calculate for the following reasons:    The valid total cholesterol range is 130 to 320 mg/dL            Reviewed and updated as needed this visit by Provider                      Current providers sharing in care for this patient include:  Patient Care Team:  Shorty Hernandez MD as PCP - General (Internal Medicine)  Shorty Hernandez MD as Assigned PCP  Aniyah Fermin PA-C as Physician Assistant (Dermatology)  Sparkle Montilla RPH as Pharmacist (Pharmacist)  Sparkle Montilla RPH as Assigned MTM Pharmacist  Aniyah Fermin PA-C as Assigned Dermatology Provider    The following health maintenance items are reviewed in Epic and correct as of today:  Health Maintenance   Topic Date Due    HF ACTION PLAN  Never done    RSV VACCINE (1 - 1-dose 75+ series) Never done    COVID-19 Vaccine (7 - 2024-25 season) 09/01/2024    DTAP/TDAP/TD IMMUNIZATION (2 - Td or Tdap) 09/08/2024    BMP  12/06/2024    ANNUAL REVIEW OF HM ORDERS  04/11/2025    LIPID  04/27/2025    ALT  06/06/2025    CBC  06/06/2025    COLORECTAL CANCER SCREENING  10/20/2025    MEDICARE ANNUAL WELLNESS VISIT  04/14/2026    FALL RISK ASSESSMENT  04/14/2026    DIABETES SCREENING  06/06/2027    ADVANCE CARE PLANNING  04/11/2029    TSH W/FREE T4 REFLEX  Completed    HEPATITIS C  "SCREENING  Completed    PHQ-2 (once per calendar year)  Completed    INFLUENZA VACCINE  Completed    Pneumococcal Vaccine: 50+ Years  Completed    ZOSTER IMMUNIZATION  Completed    HPV IMMUNIZATION  Aged Out    MENINGITIS IMMUNIZATION  Aged Out         Review of Systems  Constitutional, HEENT, cardiovascular, pulmonary, gi and gu systems are negative, except as otherwise noted.     Objective    Exam  /86 (BP Location: Left arm, Patient Position: Sitting, Cuff Size: Adult Regular)   Pulse 56   Temp 97.7  F (36.5  C) (Temporal)   Ht 1.911 m (6' 3.25\")   Wt 104.5 kg (230 lb 4.8 oz)   SpO2 96%   BMI 28.59 kg/m     Estimated body mass index is 28.59 kg/m  as calculated from the following:    Height as of this encounter: 1.911 m (6' 3.25\").    Weight as of this encounter: 104.5 kg (230 lb 4.8 oz).    Physical Exam  GENERAL: alert and no distress  EYES: Eyes grossly normal to inspection, PERRL and conjunctivae and sclerae normal  HENT: ear canals and TM's normal, nose and mouth without ulcers or lesions  NECK: no adenopathy, no asymmetry, masses, or scars  RESP: lungs clear to auscultation - no rales, rhonchi or wheezes  CV: regular rate and rhythm, normal S1 S2, no S3 or S4, no murmur, click or rub, no peripheral edema  ABDOMEN: soft, nontender, no hepatosplenomegaly, no masses and bowel sounds normal  MS: no gross musculoskeletal defects noted, no edema  NEURO: Normal strength and tone, mentation intact and speech normal  PSYCH: mentation appears normal, affect normal/bright        4/14/2025   Mini Cog   Clock Draw Score 2 Normal    2 Normal   3 Item Recall 3 objects recalled    3 objects recalled   Mini Cog Total Score 5    5       Multiple values from one day are sorted in reverse-chronological order              Signed Electronically by: PERICO HERRERA MD    "

## 2025-04-14 NOTE — PATIENT INSTRUCTIONS
Patient Education   Preventive Care Advice   This is general advice given by our system to help you stay healthy. However, your care team may have specific advice just for you. Please talk to your care team about your preventive care needs.  Nutrition  Eat 5 or more servings of fruits and vegetables each day.  Try wheat bread, brown rice and whole grain pasta (instead of white bread, rice, and pasta).  Get enough calcium and vitamin D. Check the label on foods and aim for 100% of the RDA (recommended daily allowance).  Lifestyle  Exercise at least 150 minutes each week  (30 minutes a day, 5 days a week).  Do muscle strengthening activities 2 days a week. These help control your weight and prevent disease.  No smoking.  Wear sunscreen to prevent skin cancer.  Have a dental exam and cleaning every 6 months.  Yearly exams  See your health care team every year to talk about:  Any changes in your health.  Any medicines your care team has prescribed.  Preventive care, family planning, and ways to prevent chronic diseases.  Shots (vaccines)   HPV shots (up to age 26), if you've never had them before.  Hepatitis B shots (up to age 59), if you've never had them before.  COVID-19 shot: Get this shot when it's due.  Flu shot: Get a flu shot every year.  Tetanus shot: Get a tetanus shot every 10 years.  Pneumococcal, hepatitis A, and RSV shots: Ask your care team if you need these based on your risk.  Shingles shot (for age 50 and up)  General health tests  Diabetes screening:  Starting at age 35, Get screened for diabetes at least every 3 years.  If you are younger than age 35, ask your care team if you should be screened for diabetes.  Cholesterol test: At age 39, start having a cholesterol test every 5 years, or more often if advised.  Bone density scan (DEXA): At age 50, ask your care team if you should have this scan for osteoporosis (brittle bones).  Hepatitis C: Get tested at least once in your life.  STIs (sexually  transmitted infections)  Before age 24: Ask your care team if you should be screened for STIs.  After age 24: Get screened for STIs if you're at risk. You are at risk for STIs (including HIV) if:  You are sexually active with more than one person.  You don't use condoms every time.  You or a partner was diagnosed with a sexually transmitted infection.  If you are at risk for HIV, ask about PrEP medicine to prevent HIV.  Get tested for HIV at least once in your life, whether you are at risk for HIV or not.  Cancer screening tests  Cervical cancer screening: If you have a cervix, begin getting regular cervical cancer screening tests starting at age 21.  Breast cancer scan (mammogram): If you've ever had breasts, begin having regular mammograms starting at age 40. This is a scan to check for breast cancer.  Colon cancer screening: It is important to start screening for colon cancer at age 45.  Have a colonoscopy test every 10 years (or more often if you're at risk) Or, ask your provider about stool tests like a FIT test every year or Cologuard test every 3 years.  To learn more about your testing options, visit:   .  For help making a decision, visit:   https://bit.ly/lv92680.  Prostate cancer screening test: If you have a prostate, ask your care team if a prostate cancer screening test (PSA) at age 55 is right for you.  Lung cancer screening: If you are a current or former smoker ages 50 to 80, ask your care team if ongoing lung cancer screenings are right for you.  For informational purposes only. Not to replace the advice of your health care provider. Copyright   2023 [facility]. All rights reserved. Clinically reviewed by the [facility] Transitions Program. [employer] 031542 - REV 01/24.  Eating Healthy Foods: Care Instructions  With every meal, you can make healthy food choices. Try to eat a variety of fruits, vegetables, whole grains, lean proteins, and low-fat dairy products. This can help  "you get the right balance of nutrients, including vitamins and minerals. Small changes add up over time. You can start by adding one healthy food to your meals each day.    Try to make half your plate fruits and vegetables, one-fourth whole grains, and one-fourth lean proteins. Try including dairy with your meals.   Eat more fruits and vegetables. Try to have them with most meals and snacks.   Foods for healthy eating        Fruits   These can be fresh, frozen, canned, or dried.  Try to choose whole fruit rather than fruit juice.  Eat a variety of colors.        Vegetables   These can be fresh, frozen, canned, or dried.  Beans, peas, and lentils count too.        Whole grains   Choose whole-grain breads, cereals, and noodles.  Try brown rice.        Lean proteins   These can include lean meat, poultry, fish, and eggs.  You can also have tofu, beans, peas, lentils, nuts, and seeds.        Dairy   Try milk, yogurt, and cheese.  Choose low-fat or fat-free when you can.  If you need to, use lactose-free milk or fortified plant-based milk products, such as soy milk.        Water   Drink water when you're thirsty.  Limit sugar-sweetened drinks, including soda, fruit drinks, and sports drinks.  Where can you learn more?  Go to https://www.[employer].net/patiented  Enter T756 in the search box to learn more about \"Eating Healthy Foods: Care Instructions.\"  Current as of: October 7, 2024  Content Version: 14.4    1355-7515 [employer].   Care instructions adapted under license by your healthcare professional. If you have questions about a medical condition or this instruction, always ask your healthcare professional. [employer] disclaims any warranty or liability for your use of this information.       "

## 2025-06-04 DIAGNOSIS — I48.0 PAROXYSMAL ATRIAL FIBRILLATION (H): ICD-10-CM

## 2025-06-04 RX ORDER — APIXABAN 5 MG/1
5 TABLET, FILM COATED ORAL 2 TIMES DAILY
Qty: 180 TABLET | Refills: 3 | Status: SHIPPED | OUTPATIENT
Start: 2025-06-04